# Patient Record
Sex: FEMALE | Employment: FULL TIME | URBAN - METROPOLITAN AREA
[De-identification: names, ages, dates, MRNs, and addresses within clinical notes are randomized per-mention and may not be internally consistent; named-entity substitution may affect disease eponyms.]

---

## 2017-02-20 RX ORDER — OLMESARTAN MEDOXOMIL-HYDROCHLOROTHIAZIDE 40; 25 MG/1; MG/1
TABLET, FILM COATED ORAL
Qty: 30 TAB | Refills: 11 | Status: SHIPPED | OUTPATIENT
Start: 2017-02-20 | End: 2018-01-24 | Stop reason: SDUPTHER

## 2017-03-07 RX ORDER — ESCITALOPRAM OXALATE 10 MG/1
TABLET ORAL
Qty: 30 TAB | Refills: 11 | Status: SHIPPED | OUTPATIENT
Start: 2017-03-07 | End: 2018-03-13 | Stop reason: SDUPTHER

## 2017-03-10 ENCOUNTER — TELEPHONE (OUTPATIENT)
Dept: FAMILY MEDICINE CLINIC | Age: 57
End: 2017-03-10

## 2017-03-10 NOTE — TELEPHONE ENCOUNTER
Spoke with pt and states she is taking Lexapro 10mg daily for hot flashes but wants to stop and try OTC medication for the hot flashes. Pt would like to know how to wean of medication. Informed pt would have to check with another MD since Dr. Hector Luna is out of the office today. Pt verbalized understanding. Called pt back and informed to take Lexapro 10mg- 1/2 tab daily x 1 week and then 1/2 tab every other day and then can stop per Dr. Lydia Thakkar. Pt verbalized understanding.

## 2017-03-10 NOTE — TELEPHONE ENCOUNTER
----- Message from Guy Rae sent at 3/10/2017  8:10 AM EST -----  Regarding: Dr. Celeste Valladares stated she would like a call from the nurse this morning regarding one of her medications. Best contact number [phone].

## 2017-07-19 ENCOUNTER — OFFICE VISIT (OUTPATIENT)
Dept: FAMILY MEDICINE CLINIC | Age: 57
End: 2017-07-19

## 2017-07-19 NOTE — PROGRESS NOTES
HISTORY OF PRESENT ILLNESS  Susana Aparicio is a 55 y.o. female. HPI   For follow up on BP. Doing well. Has no c/o noted today. HTN follow up:  Compliant w/ meds, low salt diet, and exercise. No home bp monitoring. No swelling, headache or dizziness. No chest pain, SOB, palpitations. Otherwise feeling well since the last visit. Glucose intolerance reveiw:  She has IGT. Last a1c 5.9% in April 2015. Diabetic ROS - further diabetic ROS: no polyuria or polydipsia, no chest pain, dyspnea or TIA's, no numbness, tingling or pain in extremities, no unusual visual symptoms. Lab review: orders written for new lab studies as appropriate; see orders  Depression Review:  Patient is seen for followup of depression. Treatment includes Lexapro. Ongoing symptoms include feeling great! Still has insomnia. She denies depressed mood and fatigue. She experiences the following side effects from the treatment: none. Patient Active Problem List   Diagnosis Code    Anemia NEC     Insomnia G47.00    Encounter for medication monitoring Z51.81    Environmental allergies Z91.09    IGT (impaired glucose tolerance) R73.02    Depression F32.9    Essential hypertension I10       Current Outpatient Prescriptions   Medication Sig Dispense Refill    escitalopram oxalate (LEXAPRO) 10 mg tablet take 1 tablet by mouth once daily 30 Tab 11    BENICAR HCT 40-25 mg per tablet take 1 tablet by mouth once daily 30 Tab 11    amLODIPine (NORVASC) 5 mg tablet take 1 by mouth every morning 30 Tab 11    cyclobenzaprine (FLEXERIL) 10 mg tablet Take 1 Tab by mouth three (3) times daily as needed for Muscle Spasm(s). 20 Tab 0    promethazine-dextromethorphan (PROMETHAZINE-DM) 6.25-15 mg/5 mL syrup Take 5 mL by mouth every six (6) hours as needed for Cough.  180 mL 1    ibuprofen (MOTRIN) 800 mg tablet take 1 tablet by mouth every 8 hours if needed for pain 60 Tab 1    ALPRAZolam (XANAX) 0.5 mg tablet Take 1 Tab by mouth nightly as needed for Sleep. Max Daily Amount: 0.5 mg. 30 Tab 3    loratadine (CLARITIN) 10 mg tablet Take 1 Tab by mouth daily as needed for Allergies. 30 Tab 0    omeprazole (PRILOSEC) 40 mg capsule Take 1 capsule by mouth daily. 30 capsule 6    fluticasone (FLONASE) 50 mcg/actuation nasal spray 2 Sprays by Both Nostrils route daily as needed for Rhinitis. 1 Bottle 6    aspirin 81 mg tablet Take 81 mg by mouth daily.          No Known Allergies      Past Medical History:   Diagnosis Date    Anemia NEC     Glucose intolerance     HTN (hypertension) 3/26/2010    Insomnia          Past Surgical History:   Procedure Laterality Date    ENDOSCOPY, COLON, DIAGNOSTIC      HX GYN  2006    fibroids removed from uterus    HX TUBAL LIGATION  1993         Family History   Problem Relation Age of Onset    Stroke Mother     Hypertension Sister     Stroke Brother     Hypertension Sister     No Known Problems Sister     No Known Problems Sister     No Known Problems Sister     Stroke Brother        Social History   Substance Use Topics    Smoking status: Never Smoker    Smokeless tobacco: Never Used    Alcohol use 1.5 oz/week     3 Glasses of wine per week        Lab Results   Component Value Date/Time    WBC 4.1 08/23/2016 04:24 PM    HGB 12.0 08/23/2016 04:24 PM    HCT 35.6 08/23/2016 04:24 PM    PLATELET 458 91/34/3540 04:24 PM    MCV 91 08/23/2016 04:24 PM       Lab Results   Component Value Date/Time    Cholesterol, total 231 08/23/2016 04:24 PM    HDL Cholesterol 72 08/23/2016 04:24 PM    LDL, calculated 135 08/23/2016 04:24 PM    Triglyceride 122 08/23/2016 04:24 PM    CHOL/HDL Ratio 2.6 05/04/2010 12:30 PM       Lab Results   Component Value Date/Time    Sodium 140 08/23/2016 04:24 PM    Potassium 3.8 08/23/2016 04:24 PM    Chloride 96 08/23/2016 04:24 PM    CO2 28 08/23/2016 04:24 PM    Anion gap 10 05/04/2010 12:30 PM    Glucose 98 08/23/2016 04:24 PM    BUN 11 08/23/2016 04:24 PM    Creatinine 0.86 08/23/2016 04:24 PM    BUN/Creatinine ratio 13 08/23/2016 04:24 PM    GFR est AA 88 08/23/2016 04:24 PM    GFR est non-AA 76 08/23/2016 04:24 PM    Calcium 10.1 08/23/2016 04:24 PM    Bilirubin, total <0.2 08/23/2016 04:24 PM    ALT (SGPT) 27 08/23/2016 04:24 PM    AST (SGOT) 22 08/23/2016 04:24 PM    Alk. phosphatase 80 08/23/2016 04:24 PM    Protein, total 8.0 08/23/2016 04:24 PM    Albumin 4.8 08/23/2016 04:24 PM    Globulin 4.0 01/04/2010 12:44 PM    A-G Ratio 1.5 08/23/2016 04:24 PM      Lab Results   Component Value Date/Time    Hemoglobin A1c 5.7 07/22/2013 02:43 PM    Hemoglobin A1c (POC) 5.9 08/23/2016 04:24 PM         Review of Systems   Constitutional: Negative for malaise/fatigue. HENT: Negative for congestion. Eyes: Negative for blurred vision. Respiratory: Negative for cough and shortness of breath. Cardiovascular: Negative for chest pain, palpitations and leg swelling. Gastrointestinal: Negative for abdominal pain, constipation and heartburn. Genitourinary: Negative for dysuria, frequency and urgency. Musculoskeletal: Negative for back pain and joint pain. Neurological: Negative for dizziness, tingling and headaches. Endo/Heme/Allergies: Negative for environmental allergies. Psychiatric/Behavioral: Negative for depression. The patient does not have insomnia. Physical Exam   Constitutional: She appears well-developed and well-nourished. /71 (BP 1 Location: Left arm, BP Patient Position: Sitting)  Pulse 66  Temp 98 °F (36.7 °C) (Oral)   Resp 16  Ht 5' 2\" (1.575 m)  Wt 152 lb (68.9 kg)  LMP 03/01/2013  SpO2 97%  BMI 27.8 kg/m2     HENT:   Right Ear: Tympanic membrane and ear canal normal.   Left Ear: Tympanic membrane and ear canal normal.   Nose: No mucosal edema or rhinorrhea. Mouth/Throat: Oropharynx is clear and moist and mucous membranes are normal.   Neck: Normal range of motion. Neck supple. No thyromegaly present.    Cardiovascular: Normal rate and regular rhythm. No murmur heard. Pulmonary/Chest: Effort normal and breath sounds normal.   Abdominal: Soft. Bowel sounds are normal. There is no tenderness. Musculoskeletal: Normal range of motion. She exhibits no edema. Lymphadenopathy:     She has no cervical adenopathy. Skin: Skin is warm and dry. Psychiatric: She has a normal mood and affect. Nursing note and vitals reviewed. ASSESSMENT and PLAN  David Smith was seen today for hypertension. Diagnoses and all orders for this visit:    Essential hypertension with goal blood pressure less than 140/90  -     LIPID PANEL    IGT (impaired glucose tolerance)  -     AMB POC HEMOGLOBIN A1C    Depression, unspecified depression type  Stable     Encounter for medication monitoring  -     METABOLIC PANEL, COMPREHENSIVE  -     CBC W/O DIFF      Follow-up Disposition: Not on File  reviewed diet, exercise and weight control  cardiovascular risk and specific lipid/LDL goals reviewed  reviewed medications and side effects in detail  specific diabetic recommendations: low cholesterol diet, weight control and daily exercise discussed and glycohemoglobin and other lab monitoring discussed     I have discussed diagnosis listed in this note with pt and/or family. I have discussed treatment plans and options and the risk/benefit analysis of those options, including safe use of medications and possible medication side effects. Through the use of shared decision making we have agreed to the above plan. The patient has received an after-visit summary and questions were answered concerning future plans and follow up. Advise pt of any urgent changes then to proceed to the ER.

## 2017-07-19 NOTE — MR AVS SNAPSHOT
Visit Information Date & Time Provider Department Dept. Phone Encounter #  
 7/19/2017  3:15 PM Janna Calixto MD [facility] [phone] [id number] Upcoming Health Maintenance Date Due Hepatitis C Screening 1960 PAP AKA CERVICAL CYTOLOGY 10/14/2017 INFLUENZA AGE 9 TO ADULT 8/1/2017 BREAST CANCER SCRN MAMMOGRAM 7/26/2018 COLONOSCOPY 6/19/2023 DTaP/Tdap/Td series (2 - Td) 8/23/2026 Allergies as of 7/19/2017  Review Complete On: 7/19/2017 By: Janna Calixto MD  
 No Known Allergies Current Immunizations  Reviewed on 8/23/2016 Name Date Influenza Vaccine 9/15/2014 Influenza Vaccine (Quad) PF 8/23/2016, 11/20/2015 Influenza Vaccine Split 10/22/2012, 11/22/2011, 11/5/2010 Tdap 8/23/2016 Not reviewed this visit Vitals LMP OB Status Smoking Status 03/01/2013 Postmenopausal Never Smoker Preferred Pharmacy Pharmacy Name Phone [phone] [phone] Your Updated Medication List  
  
   
This list is accurate as of: 7/19/17  4:25 PM.  Always use your most recent med list.  
  
  
  
  
 ALPRAZolam 0.5 mg tablet Commonly known as:  Tang Fabio Take 1 Tab by mouth nightly as needed for Sleep. Max Daily Amount: 0.5 mg.  
  
 amLODIPine 5 mg tablet Commonly known as:  NORVASC  
take 1 by mouth every morning  
  
 aspirin 81 mg tablet Take 81 mg by mouth daily. BENICAR HCT 40-25 mg per tablet Generic drug:  olmesartan-hydroCHLOROthiazide  
take 1 tablet by mouth once daily  
  
 cyclobenzaprine 10 mg tablet Commonly known as:  FLEXERIL Take 1 Tab by mouth three (3) times daily as needed for Muscle Spasm(s). escitalopram oxalate 10 mg tablet Commonly known as:  LEXAPRO  
take 1 tablet by mouth once daily  
  
 fluticasone 50 mcg/actuation nasal spray Commonly known as:  Liliana Sterling 2 Sprays by Both Nostrils route daily as needed for Rhinitis. ibuprofen 800 mg tablet Commonly known as:  MOTRIN  
take 1 tablet by mouth every 8 hours if needed for pain  
  
 loratadine 10 mg tablet Commonly known as:  Aster Case Take 1 Tab by mouth daily as needed for Allergies. omeprazole 40 mg capsule Commonly known as:  PriLOSEC Take 1 capsule by mouth daily. promethazine-dextromethorphan 6.25-15 mg/5 mL syrup Commonly known as:  PROMETHAZINE-DM Take 5 mL by mouth every six (6) hours as needed for Cough. Introducing [facility]! [facility] introduces [facility]Arc patient portal. Now you can access parts of your medical record, email your doctor's office, and request medication refills online. 1. In your internet browser, go to [email or web address] 2. Click on the First Time User? Click Here link in the Sign In box. You will see the New Member Sign Up page. 3. Enter your BlueArc Access Code exactly as it appears below. You will not need to use this code after youve completed the sign-up process. If you do not sign up before the expiration date, you must request a new code. · BlueArc Access Code: [email or web address] Expires: 10/17/2017  4:25 PM 
 
4. Enter the last four digits of your Social Security Number (xxxx) and Date of Birth (mm/dd/yyyy) as indicated and click Submit. You will be taken to the next sign-up page. 5. Create a BlueArc ID. This will be your BlueArc login ID and cannot be changed, so think of one that is secure and easy to remember. 6. Create a BlueArc password. You can change your password at any time. 7. Enter your Password Reset Question and Answer. This can be used at a later time if you forget your password. 8. Enter your e-mail address. You will receive e-mail notification when new information is available in [address] 9. Click Sign Up. You can now view and download portions of your medical record. 10. Click the Download Summary menu link to download a portable copy of your medical information. If you have questions, please visit the Frequently Asked Questions section of the [employer] website. Remember, [employer] is NOT to be used for urgent needs. For medical emergencies, dial 911. Now available from your iPhone and Android! Please provide this summary of care documentation to your next provider. Your primary care clinician is listed as Josafat Shukla. If you have any questions after today's visit, please call [phone].

## 2017-08-30 ENCOUNTER — OFFICE VISIT (OUTPATIENT)
Dept: FAMILY MEDICINE CLINIC | Age: 57
End: 2017-08-30

## 2017-08-30 VITALS
TEMPERATURE: 98.1 F | HEART RATE: 99 BPM | RESPIRATION RATE: 20 BRPM | DIASTOLIC BLOOD PRESSURE: 79 MMHG | BODY MASS INDEX: 28.52 KG/M2 | OXYGEN SATURATION: 95 % | SYSTOLIC BLOOD PRESSURE: 137 MMHG | HEIGHT: 62 IN | WEIGHT: 155 LBS

## 2017-08-30 DIAGNOSIS — Z11.59 NEED FOR HEPATITIS C SCREENING TEST: ICD-10-CM

## 2017-08-30 DIAGNOSIS — R73.02 IGT (IMPAIRED GLUCOSE TOLERANCE): ICD-10-CM

## 2017-08-30 DIAGNOSIS — F32.A DEPRESSION, UNSPECIFIED DEPRESSION TYPE: ICD-10-CM

## 2017-08-30 DIAGNOSIS — Z51.81 ENCOUNTER FOR MEDICATION MONITORING: ICD-10-CM

## 2017-08-30 DIAGNOSIS — Z12.31 ENCOUNTER FOR SCREENING MAMMOGRAM FOR BREAST CANCER: ICD-10-CM

## 2017-08-30 DIAGNOSIS — I10 ESSENTIAL HYPERTENSION: Primary | ICD-10-CM

## 2017-08-30 DIAGNOSIS — Z91.09 ENVIRONMENTAL ALLERGIES: ICD-10-CM

## 2017-08-30 DIAGNOSIS — Z23 ENCOUNTER FOR IMMUNIZATION: ICD-10-CM

## 2017-08-30 LAB
BILIRUB UR QL STRIP: NEGATIVE
GLUCOSE UR-MCNC: NEGATIVE MG/DL
HBA1C MFR BLD HPLC: 5.9 %
KETONES P FAST UR STRIP-MCNC: NEGATIVE MG/DL
PH UR STRIP: 7 [PH] (ref 4.6–8)
PROT UR QL STRIP: NEGATIVE MG/DL
SP GR UR STRIP: 1.01 (ref 1–1.03)
UA UROBILINOGEN AMB POC: NORMAL (ref 0.2–1)
URINALYSIS CLARITY POC: CLEAR
URINALYSIS COLOR POC: YELLOW
URINE BLOOD POC: NORMAL
URINE LEUKOCYTES POC: NEGATIVE
URINE NITRITES POC: NEGATIVE

## 2017-08-30 NOTE — PATIENT INSTRUCTIONS
Vaccine Information Statement    Influenza (Flu) Vaccine (Inactivated or Recombinant): What you need to know    Many Vaccine Information Statements are available in Turkish and other languages. See www.immunize.org/vis  Hojas de Información Sobre Vacunas están disponibles en Español y en muchos otros idiomas. Visite www.immunize.org/vis    1. Why get vaccinated? Influenza (flu) is a contagious disease that spreads around the [place] every year, usually between October and May. Flu is caused by influenza viruses, and is spread mainly by coughing, sneezing, and close contact. Anyone can get flu. Flu strikes suddenly and can last several days. Symptoms vary by age, but can include:   fever/chills   sore throat   muscle aches   fatigue   cough   headache    runny or stuffy nose    Flu can also lead to pneumonia and blood infections, and cause diarrhea and seizures in children. If you have a medical condition, such as heart or lung disease, flu can make it worse. Flu is more dangerous for some people. Infants and young children, people 72years of age and older, pregnant women, and people with certain health conditions or a weakened immune system are at greatest risk. Each year thousands of people in the [facility] die from flu, and many more are hospitalized. Flu vaccine can:   keep you from getting flu,   make flu less severe if you do get it, and   keep you from spreading flu to your family and other people. 2. Inactivated and recombinant flu vaccines    A dose of flu vaccine is recommended every flu season. Children 6 months through 6years of age may need two doses during the same flu season. Everyone else needs only one dose each flu season.        Some inactivated flu vaccines contain a very small amount of a mercury-based preservative called thimerosal. Studies have not shown thimerosal in vaccines to be harmful, but flu vaccines that do not contain thimerosal are available. There is no live flu virus in flu shots. They cannot cause the flu. There are many flu viruses, and they are always changing. Each year a new flu vaccine is made to protect against three or four viruses that are likely to cause disease in the upcoming flu season. But even when the vaccine doesnt exactly match these viruses, it may still provide some protection    Flu vaccine cannot prevent:   flu that is caused by a virus not covered by the vaccine, or   illnesses that look like flu but are not. It takes about 2 weeks for protection to develop after vaccination, and protection lasts through the flu season. 3. Some people should not get this vaccine    Tell the person who is giving you the vaccine:     If you have any severe, life-threatening allergies. If you ever had a life-threatening allergic reaction after a dose of flu vaccine, or have a severe allergy to any part of this vaccine, you may be advised not to get vaccinated. Most, but not all, types of flu vaccine contain a small amount of egg protein.  If you ever had Guillain-Barré Syndrome (also called GBS). Some people with a history of GBS should not get this vaccine. This should be discussed with your doctor.  If you are not feeling well. It is usually okay to get flu vaccine when you have a mild illness, but you might be asked to come back when you feel better. 4. Risks of a vaccine reaction    With any medicine, including vaccines, there is a chance of reactions. These are usually mild and go away on their own, but serious reactions are also possible. Most people who get a flu shot do not have any problems with it.      Minor problems following a flu shot include:    soreness, redness, or swelling where the shot was given     hoarseness   sore, red or itchy eyes   cough   fever   aches   headache   itching   fatigue  If these problems occur, they usually begin soon after the shot and last 1 or 2 days. More serious problems following a flu shot can include the following:     There may be a small increased risk of Guillain-Barré Syndrome (GBS) after inactivated flu vaccine. This risk has been estimated at 1 or 2 additional cases per million people vaccinated. This is much lower than the risk of severe complications from flu, which can be prevented by flu vaccine.  Young children who get the flu shot along with pneumococcal vaccine (PCV13) and/or DTaP vaccine at the same time might be slightly more likely to have a seizure caused by fever. Ask your doctor for more information. Tell your doctor if a child who is getting flu vaccine has ever had a seizure. Problems that could happen after any injected vaccine:      People sometimes faint after a medical procedure, including vaccination. Sitting or lying down for about 15 minutes can help prevent fainting, and injuries caused by a fall. Tell your doctor if you feel dizzy, or have vision changes or ringing in the ears.  Some people get severe pain in the shoulder and have difficulty moving the arm where a shot was given. This happens very rarely.  Any medication can cause a severe allergic reaction. Such reactions from a vaccine are very rare, estimated at about 1 in a million doses, and would happen within a few minutes to a few hours after the vaccination. As with any medicine, there is a very remote chance of a vaccine causing a serious injury or death. The safety of vaccines is always being monitored. For more information, visit: www.cdc.gov/vaccinesafety/    5. What if there is a serious reaction? What should I look for?  Look for anything that concerns you, such as signs of a severe allergic reaction, very high fever, or unusual behavior.     Signs of a severe allergic reaction can include hives, swelling of the face and throat, difficulty breathing, a fast heartbeat, dizziness, and weakness  usually within a few minutes to a few hours after the vaccination. What should I do?  If you think it is a severe allergic reaction or other emergency that cant wait, call 9-1-1 and get the person to the nearest hospital. Otherwise, call your doctor.  Reactions should be reported to the Vaccine Adverse Event Reporting System (VAERS). Your doctor should file this report, or you can do it yourself through  the VAERS web site at www.vaers. [facility].gov, or by calling 0-532.290.6732. VAERS does not give medical advice. 6. The National Vaccine Injury Compensation Program    The [facility] Vaccine Injury Compensation Program (VICP) is a federal program that was created to compensate people who may have been injured by certain vaccines. Persons who believe they may have been injured by a vaccine can learn about the program and about filing a claim by calling 7-655.637.4531 or visiting the [employer] website at www.[facility].gov/vaccinecompensation. There is a time limit to file a claim for compensation. 7. How can I learn more?  Ask your healthcare provider. He or she can give you the vaccine package insert or suggest other sources of information.  Call your local or state health department.  Contact the Centers for Disease Control and Prevention (CDC):  - Call 2-197.313.6245 (1-800-CDC-INFO) or  - Visit CDCs website at www.cdc.gov/flu    Vaccine Information Statement   Inactivated Influenza Vaccine   8/7/2015  42 MINA Moore [id number]-[id number]    Department of Health and Human Services  Centers for Disease Control and Prevention    Office Use Only

## 2017-08-30 NOTE — MR AVS SNAPSHOT
Visit Information Date & Time Provider Department Dept. Phone Encounter #  
 8/30/2017  9:45 AM Pallavi Becker MD [facility] [phone] [id number] Follow-up Instructions Return in about 6 months (around 2/28/2018). Upcoming Health Maintenance Date Due Hepatitis C Screening 1960 PAP AKA CERVICAL CYTOLOGY 10/14/2017 BREAST CANCER SCRN MAMMOGRAM 7/26/2018 COLONOSCOPY 6/19/2023 DTaP/Tdap/Td series (2 - Td) 8/23/2026 Allergies as of 8/30/2017  Review Complete On: 8/30/2017 By: Pallavi Becker MD  
 No Known Allergies Current Immunizations  Reviewed on 8/30/2017 Name Date Influenza Vaccine 9/15/2014 Influenza Vaccine (Quad) PF 8/30/2017 10:29 AM, 8/23/2016, 11/20/2015 Influenza Vaccine Split 10/22/2012, 11/22/2011, 11/5/2010 Tdap 8/23/2016 Reviewed by Pallavi Becker MD on 8/30/2017 at 10:44 AM  
You Were Diagnosed With   
  
 Codes Comments Essential hypertension    -  Primary ICD-10-CM: I10 
ICD-9-CM: 401.9 Encounter for medication monitoring     ICD-10-CM: Z51.81 
ICD-9-CM: V58.83 Environmental allergies     ICD-10-CM: Z91.09 
ICD-9-CM: V15.09   
 IGT (impaired glucose tolerance)     ICD-10-CM: R73.02 
ICD-9-CM: 790.22 Encounter for immunization     ICD-10-CM: H79 ICD-9-CM: V03.89 Vitals BP Pulse Temp Resp Height(growth percentile) Weight(growth percentile)  
 137/79 99 98.1 °F (36.7 °C) (Oral) 20 5' 2\" (1.575 m) 155 lb (70.3 kg) LMP SpO2 BMI OB Status Smoking Status 03/01/2013 95% 28.35 kg/m2 Postmenopausal Never Smoker Vitals History BMI and BSA Data Body Mass Index Body Surface Area  
 28.35 kg/m 2 1.75 m 2 Preferred Pharmacy Pharmacy Name Phone [facility] [id number] [phone] Your Updated Medication List  
  
   
 This list is accurate as of: 8/30/17 10:47 AM.  Always use your most recent med list.  
  
  
  
  
 ALPRAZolam 0.5 mg tablet Commonly known as:  Nany  Take 1 Tab by mouth nightly as needed for Sleep. Max Daily Amount: 0.5 mg.  
  
 amLODIPine 5 mg tablet Commonly known as:  NORVASC  
take 1 by mouth every morning  
  
 aspirin 81 mg tablet Take 81 mg by mouth daily. BENICAR HCT 40-25 mg per tablet Generic drug:  olmesartan-hydroCHLOROthiazide  
take 1 tablet by mouth once daily  
  
 escitalopram oxalate 10 mg tablet Commonly known as:  LEXAPRO  
take 1 tablet by mouth once daily  
  
 ibuprofen 800 mg tablet Commonly known as:  MOTRIN  
take 1 tablet by mouth every 8 hours if needed for pain  
  
 omeprazole 40 mg capsule Commonly known as:  PriLOSEC Take 1 capsule by mouth daily. We Performed the Following AMB POC HEMOGLOBIN A1C [02409 CPT(R)] AMB POC URINALYSIS DIP STICK AUTO W/ MICRO [46696 CPT(R)] CBC W/O DIFF [58270 CPT(R)] INFLUENZA VIRUS VAC QUAD,SPLIT,PRESV FREE SYRINGE 3/> YRS IM [id number] CPT(R)] LIPID PANEL [03733 CPT(R)] METABOLIC PANEL, COMPREHENSIVE [41504 CPT(R)] Follow-up Instructions Return in about 6 months (around 2/28/2018). Patient Instructions Vaccine Information Statement Influenza (Flu) Vaccine (Inactivated or Recombinant): What you need to know Many Vaccine Information Statements are available in Maltese and other languages. See www.immunize.org/vis Hojas de Información Sobre Vacunas están disponibles en Español y en muchos otros idiomas. Visite www.immunize.org/vis 1. Why get vaccinated? Influenza (flu) is a contagious disease that spreads around the [place] every year, usually between October and May. Flu is caused by influenza viruses, and is spread mainly by coughing, sneezing, and close contact. Anyone can get flu. Flu strikes suddenly and can last several days. Symptoms vary by age, but can include: 
 fever/chills  sore throat  muscle aches  fatigue  cough  headache  runny or stuffy nose Flu can also lead to pneumonia and blood infections, and cause diarrhea and seizures in children. If you have a medical condition, such as heart or lung disease, flu can make it worse. Flu is more dangerous for some people. Infants and young children, people 72years of age and older, pregnant women, and people with certain health conditions or a weakened immune system are at greatest risk. Each year thousands of people in the [facility] die from flu, and many more are hospitalized. Flu vaccine can: 
 keep you from getting flu, 
 make flu less severe if you do get it, and 
 keep you from spreading flu to your family and other people. 2. Inactivated and recombinant flu vaccines A dose of flu vaccine is recommended every flu season. Children 6 months through 6years of age may need two doses during the same flu season. Everyone else needs only one dose each flu season. Some inactivated flu vaccines contain a very small amount of a mercury-based preservative called thimerosal. Studies have not shown thimerosal in vaccines to be harmful, but flu vaccines that do not contain thimerosal are available. There is no live flu virus in flu shots. They cannot cause the flu. There are many flu viruses, and they are always changing. Each year a new flu vaccine is made to protect against three or four viruses that are likely to cause disease in the upcoming flu season. But even when the vaccine doesnt exactly match these viruses, it may still provide some protection Flu vaccine cannot prevent: 
 flu that is caused by a virus not covered by the vaccine, or 
 illnesses that look like flu but are not. It takes about 2 weeks for protection to develop after vaccination, and protection lasts through the flu season. 3. Some people should not get this vaccine Tell the person who is giving you the vaccine:  If you have any severe, life-threatening allergies. If you ever had a life-threatening allergic reaction after a dose of flu vaccine, or have a severe allergy to any part of this vaccine, you may be advised not to get vaccinated. Most, but not all, types of flu vaccine contain a small amount of egg protein.  If you ever had Guillain-Barré Syndrome (also called GBS). Some people with a history of GBS should not get this vaccine. This should be discussed with your doctor.  If you are not feeling well. It is usually okay to get flu vaccine when you have a mild illness, but you might be asked to come back when you feel better. 4. Risks of a vaccine reaction With any medicine, including vaccines, there is a chance of reactions. These are usually mild and go away on their own, but serious reactions are also possible. Most people who get a flu shot do not have any problems with it. Minor problems following a flu shot include:  
 soreness, redness, or swelling where the shot was given  hoarseness  sore, red or itchy eyes  cough  fever  aches  headache  itching  fatigue If these problems occur, they usually begin soon after the shot and last 1 or 2 days. More serious problems following a flu shot can include the following:  There may be a small increased risk of Guillain-Barré Syndrome (GBS) after inactivated flu vaccine. This risk has been estimated at 1 or 2 additional cases per million people vaccinated. This is much lower than the risk of severe complications from flu, which can be prevented by flu vaccine.  Young children who get the flu shot along with pneumococcal vaccine (PCV13) and/or DTaP vaccine at the same time might be slightly more likely to have a seizure caused by fever. Ask your doctor for more information. Tell your doctor if a child who is getting flu vaccine has ever had a seizure. Problems that could happen after any injected vaccine:  People sometimes faint after a medical procedure, including vaccination. Sitting or lying down for about 15 minutes can help prevent fainting, and injuries caused by a fall. Tell your doctor if you feel dizzy, or have vision changes or ringing in the ears.  Some people get severe pain in the shoulder and have difficulty moving the arm where a shot was given. This happens very rarely.  Any medication can cause a severe allergic reaction. Such reactions from a vaccine are very rare, estimated at about 1 in a million doses, and would happen within a few minutes to a few hours after the vaccination. As with any medicine, there is a very remote chance of a vaccine causing a serious injury or death. The safety of vaccines is always being monitored. For more information, visit: www.cdc.gov/vaccinesafety/ 
 
 
6. The National Vaccine Injury [employer] 
 
 The "[employer]" Injury Compensation Program (VICP) is a federal program that was created to compensate people who may have been injured by certain vaccines. Persons who believe they may have been injured by a vaccine can learn about the program and about filing a claim by calling 7-278.502.2714 or visiting the [employer] website at www.[facility].gov/vaccinecompensation. There is a time limit to file a claim for compensation. 7. How can I learn more?  Ask your healthcare provider. He or she can give you the vaccine package insert or suggest other sources of information.  Call your local or state health department.  Contact the Centers for Disease Control and Prevention (CDC): 
- Call 1-331.235.4073 (9-252-UBX-INFO) or 
- Visit CDCs website at www.cdc.gov/flu Vaccine Information Statement Inactivated Influenza Vaccine 8/7/2015 
42 MIN[address] [id number] Department [facility] [employer] Centers for Disease Control and Prevention Office Use Only Introducing [facility]! Daniel Ziegler introduces [employer] patient portal. Now you can access parts of your medical record, email your doctor's office, and request medication refills online. 1. In your internet browser, go to [email or web address] 2. Click on the First Time User? Click Here link in the Sign In box. You will see the New Member Sign Up page. 3. Enter your [employer] Access Code exactly as it appears below. You will not need to use this code after youve completed the sign-up process. If you do not sign up before the expiration date, you must request a new code. · [employer] Access Code: [email or web address] Expires: 10/17/2017  4:25 PM 
 
4. Enter the last four digits of your Social Security Number (xxxx) and Date of Birth (mm/dd/yyyy) as indicated and click Submit. You will be taken to the next sign-up page. 5. Create a [employer] ID.  This will be your [employer] login ID and cannot be changed, so think of one that is secure and easy to remember. 6. Create a [employer] password. You can change your password at any time. 7. Enter your Password Reset Question and Answer. This can be used at a later time if you forget your password. 8. Enter your e-mail address. You will receive e-mail notification when new information is available in [address] 9. Click Sign Up. You can now view and download portions of your medical record. 10. Click the Download Summary menu link to download a portable copy of your medical information. If you have questions, please visit the Frequently Asked Questions section of the [employer] website. Remember, [employer] is NOT to be used for urgent needs. For medical emergencies, dial 911. Now available from your iPhone and Android! Please provide this summary of care documentation to your next provider. Your primary care clinician is listed as Vikash Hendrix. If you have any questions after today's visit, please call [phone].

## 2017-08-30 NOTE — PROGRESS NOTES
HISTORY OF PRESENT ILLNESS  Precious Sal is a 65 y.o. female. HPI   For follow up on BP. Doing well. Has no c/o noted today. HTN follow up:  Compliant w/ meds, low salt diet, and exercise. No home bp monitoring. No swelling, headache or dizziness. No chest pain, SOB, palpitations. Otherwise feeling well since the last visit. Glucose intolerance reveiw:  She has IGT. Last a1c 5.9% in April 2015 and August 2016. Diabetic ROS - further diabetic ROS: no polyuria or polydipsia, no chest pain, dyspnea or TIA's, no numbness, tingling or pain in extremities, no unusual visual symptoms. Lab review: orders written for new lab studies as appropriate; see orders  Depression Review:  Patient is seen for followup of depression. Treatment includes Lexapro. Ongoing symptoms include feeling great! Still has insomnia. She denies depressed mood and fatigue. She experiences the following side effects from the treatment: none. Patient Active Problem List   Diagnosis Code    Anemia NEC     Insomnia G47.00    Encounter for medication monitoring Z51.81    Environmental allergies Z91.09    IGT (impaired glucose tolerance) R73.02    Depression F32.9    Essential hypertension I10       Current Outpatient Prescriptions   Medication Sig Dispense Refill    escitalopram oxalate (LEXAPRO) 10 mg tablet take 1 tablet by mouth once daily 30 Tab 11    BENICAR HCT 40-25 mg per tablet take 1 tablet by mouth once daily 30 Tab 11    amLODIPine (NORVASC) 5 mg tablet take 1 by mouth every morning 30 Tab 11    cyclobenzaprine (FLEXERIL) 10 mg tablet Take 1 Tab by mouth three (3) times daily as needed for Muscle Spasm(s). 20 Tab 0    promethazine-dextromethorphan (PROMETHAZINE-DM) 6.25-15 mg/5 mL syrup Take 5 mL by mouth every six (6) hours as needed for Cough.  180 mL 1    ibuprofen (MOTRIN) 800 mg tablet take 1 tablet by mouth every 8 hours if needed for pain 60 Tab 1    ALPRAZolam (XANAX) 0.5 mg tablet Take 1 Tab by mouth nightly as needed for Sleep. Max Daily Amount: 0.5 mg. 30 Tab 3    loratadine (CLARITIN) 10 mg tablet Take 1 Tab by mouth daily as needed for Allergies. 30 Tab 0    omeprazole (PRILOSEC) 40 mg capsule Take 1 capsule by mouth daily. 30 capsule 6    fluticasone (FLONASE) 50 mcg/actuation nasal spray 2 Sprays by Both Nostrils route daily as needed for Rhinitis. 1 Bottle 6    aspirin 81 mg tablet Take 81 mg by mouth daily.          No Known Allergies      Past Medical History:   Diagnosis Date    Anemia NEC     Glucose intolerance     HTN (hypertension) 3/26/2010    Insomnia          Past Surgical History:   Procedure Laterality Date    ENDOSCOPY, COLON, DIAGNOSTIC      HX GYN  2006    fibroids removed from uterus    HX TUBAL LIGATION  1993         Family History   Problem Relation Age of Onset    Stroke Mother     Hypertension Sister     Stroke Brother     Hypertension Sister     No Known Problems Sister     No Known Problems Sister     No Known Problems Sister     Stroke Brother        Social History   Substance Use Topics    Smoking status: Never Smoker    Smokeless tobacco: Never Used    Alcohol use 1.5 oz/week     3 Glasses of wine per week        Lab Results   Component Value Date/Time    WBC 4.1 08/23/2016 04:24 PM    HGB 12.0 08/23/2016 04:24 PM    HCT 35.6 08/23/2016 04:24 PM    PLATELET 860 06/45/0253 04:24 PM    MCV 91 08/23/2016 04:24 PM       Lab Results   Component Value Date/Time    Cholesterol, total 231 08/23/2016 04:24 PM    HDL Cholesterol 72 08/23/2016 04:24 PM    LDL, calculated 135 08/23/2016 04:24 PM    Triglyceride 122 08/23/2016 04:24 PM    CHOL/HDL Ratio 2.6 05/04/2010 12:30 PM       Lab Results   Component Value Date/Time    Sodium 140 08/23/2016 04:24 PM    Potassium 3.8 08/23/2016 04:24 PM    Chloride 96 08/23/2016 04:24 PM    CO2 28 08/23/2016 04:24 PM    Anion gap 10 05/04/2010 12:30 PM    Glucose 98 08/23/2016 04:24 PM    BUN 11 08/23/2016 04:24 PM Creatinine 0.86 08/23/2016 04:24 PM    BUN/Creatinine ratio 13 08/23/2016 04:24 PM    GFR est AA 88 08/23/2016 04:24 PM    GFR est non-AA 76 08/23/2016 04:24 PM    Calcium 10.1 08/23/2016 04:24 PM    Bilirubin, total <0.2 08/23/2016 04:24 PM    ALT (SGPT) 27 08/23/2016 04:24 PM    AST (SGOT) 22 08/23/2016 04:24 PM    Alk. phosphatase 80 08/23/2016 04:24 PM    Protein, total 8.0 08/23/2016 04:24 PM    Albumin 4.8 08/23/2016 04:24 PM    Globulin 4.0 01/04/2010 12:44 PM    A-G Ratio 1.5 08/23/2016 04:24 PM      Lab Results   Component Value Date/Time    Hemoglobin A1c 5.7 07/22/2013 02:43 PM    Hemoglobin A1c (POC) 5.9 08/23/2016 04:24 PM         Review of Systems   Constitutional: Negative for malaise/fatigue. HENT: Negative for congestion. Eyes: Negative for blurred vision. Respiratory: Negative for cough and shortness of breath. Cardiovascular: Negative for chest pain, palpitations and leg swelling. Gastrointestinal: Negative for abdominal pain, constipation and heartburn. Genitourinary: Negative for dysuria, frequency and urgency. Musculoskeletal: Negative for back pain and joint pain. Neurological: Negative for dizziness, tingling and headaches. Endo/Heme/Allergies: Negative for environmental allergies. Psychiatric/Behavioral: Negative for depression. The patient does not have insomnia. Physical Exam   Constitutional: She appears well-developed and well-nourished. /79  Pulse 99  Temp 98.1 °F (36.7 °C) (Oral)   Resp 20  Ht 5' 2\" (1.575 m)  Wt 155 lb (70.3 kg)  LMP 03/01/2013  SpO2 95%  BMI 28.35 kg/m2     HENT:   Right Ear: Tympanic membrane and ear canal normal.   Left Ear: Tympanic membrane and ear canal normal.   Nose: No mucosal edema or rhinorrhea. Mouth/Throat: Oropharynx is clear and moist and mucous membranes are normal.   Neck: Normal range of motion. Neck supple. No thyromegaly present. Cardiovascular: Normal rate and regular rhythm.     No murmur heard.  Pulmonary/Chest: Effort normal and breath sounds normal.   Abdominal: Soft. Bowel sounds are normal. There is no tenderness. Musculoskeletal: Normal range of motion. She exhibits no edema. Lymphadenopathy:     She has no cervical adenopathy. Skin: Skin is warm and dry. Psychiatric: She has a normal mood and affect. Nursing note and vitals reviewed. ASSESSMENT and PLAN  Diagnoses and all orders for this visit:    1. Essential hypertension  -     LIPID PANEL  -     AMB POC URINALYSIS DIP STICK AUTO W/ MICRO    2. Depression, unspecified depression type  Stable     3. IGT (impaired glucose tolerance)  -     AMB POC HEMOGLOBIN A1C    4. Environmental allergies  Stable     5. Encounter for medication monitoring  -     METABOLIC PANEL, COMPREHENSIVE  -     CBC W/O DIFF    6. Encounter for immunization  -     Influenza virus vaccine (QUADRIVALENT PRES FREE SYRINGE) IM (12678)    7. Encounter for screening mammogram for breast cancer  -     [facility] MAMMO BI SCREENING INCL CAD; Future      Follow-up Disposition:  Return in about 6 months (around 2/28/2018). reviewed diet, exercise and weight control  cardiovascular risk and specific lipid/LDL goals reviewed  reviewed medications and side effects in detail    I have discussed diagnosis listed in this note with pt and/or family. I have discussed treatment plans and options and the risk/benefit analysis of those options, including safe use of medications and possible medication side effects. Through the use of shared decision making we have agreed to the above plan. The patient has received an after-visit summary and questions were answered concerning future plans and follow up. Advise pt of any urgent changes then to proceed to the ER.

## 2017-09-01 LAB
ALBUMIN SERPL-MCNC: 4.9 G/DL (ref 3.5–5.5)
ALBUMIN/GLOB SERPL: 1.5 {RATIO} (ref 1.2–2.2)
ALP SERPL-CCNC: 78 IU/L (ref 39–117)
ALT SERPL-CCNC: 20 IU/L (ref 0–32)
AST SERPL-CCNC: 19 IU/L (ref 0–40)
BILIRUB SERPL-MCNC: 0.2 MG/DL (ref 0–1.2)
BUN SERPL-MCNC: 10 MG/DL (ref 6–24)
BUN/CREAT SERPL: 12 (ref 9–23)
CALCIUM SERPL-MCNC: 10 MG/DL (ref 8.7–10.2)
CHLORIDE SERPL-SCNC: 95 MMOL/L (ref 96–106)
CHOLEST SERPL-MCNC: 227 MG/DL (ref 100–199)
CO2 SERPL-SCNC: 28 MMOL/L (ref 18–29)
CREAT SERPL-MCNC: 0.84 MG/DL (ref 0.57–1)
ERYTHROCYTE [DISTWIDTH] IN BLOOD BY AUTOMATED COUNT: 14.5 % (ref 12.3–15.4)
GLOBULIN SER CALC-MCNC: 3.2 G/DL (ref 1.5–4.5)
GLUCOSE SERPL-MCNC: 127 MG/DL (ref 65–99)
HCT VFR BLD AUTO: 37.3 % (ref 34–46.6)
HCV AB S/CO SERPL IA: <0.1 S/CO RATIO (ref 0–0.9)
HDLC SERPL-MCNC: 69 MG/DL
HGB BLD-MCNC: 12.6 G/DL (ref 11.1–15.9)
INTERPRETATION, 910389: NORMAL
LDLC SERPL CALC-MCNC: 134 MG/DL (ref 0–99)
MCH RBC QN AUTO: 31 PG (ref 26.6–33)
MCHC RBC AUTO-ENTMCNC: 33.8 G/DL (ref 31.5–35.7)
MCV RBC AUTO: 92 FL (ref 79–97)
PLATELET # BLD AUTO: 287 X10E3/UL (ref 150–379)
POTASSIUM SERPL-SCNC: 3.8 MMOL/L (ref 3.5–5.2)
PROT SERPL-MCNC: 8.1 G/DL (ref 6–8.5)
RBC # BLD AUTO: 4.07 X10E6/UL (ref 3.77–5.28)
SODIUM SERPL-SCNC: 139 MMOL/L (ref 134–144)
TRIGL SERPL-MCNC: 120 MG/DL (ref 0–149)
VLDLC SERPL CALC-MCNC: 24 MG/DL (ref 5–40)
WBC # BLD AUTO: 3.4 X10E3/UL (ref 3.4–10.8)

## 2017-09-15 RX ORDER — IBUPROFEN 800 MG/1
TABLET ORAL
Qty: 60 TAB | Refills: 1 | Status: SHIPPED | OUTPATIENT
Start: 2017-09-15 | End: 2018-07-16 | Stop reason: SDUPTHER

## 2017-10-04 DIAGNOSIS — Z12.31 ENCOUNTER FOR SCREENING MAMMOGRAM FOR BREAST CANCER: ICD-10-CM

## 2017-11-27 RX ORDER — AZITHROMYCIN 250 MG/1
TABLET, FILM COATED ORAL
Qty: 6 TAB | Refills: 0 | Status: SHIPPED | OUTPATIENT
Start: 2017-11-27 | End: 2017-12-02

## 2017-11-27 NOTE — TELEPHONE ENCOUNTER
----- Message from Khang Mayer sent at 11/27/2017  9:34 AM EST -----  Regarding: Dr. Jennifer Rubio  Pt would like medication for her head cold  sent to [facility] on [address]. The best contact number is [phone]. Pt stated she's had for 3 days and currently taking \"Coricidin\".

## 2017-11-27 NOTE — TELEPHONE ENCOUNTER
Pt would like antibiotic called into pharm. Pt c/o sinus congestion and sinus drainage. Will check with Dr. Niyah Barroso and call back. Called back and informed that Dr. Niyah Barroso will be sending an antibiotic to pharm. Pt verbalized understanding.

## 2017-12-16 DIAGNOSIS — I10 ESSENTIAL HYPERTENSION: ICD-10-CM

## 2017-12-18 RX ORDER — AMLODIPINE BESYLATE 5 MG/1
TABLET ORAL
Qty: 30 TAB | Refills: 11 | Status: SHIPPED | OUTPATIENT
Start: 2017-12-18 | End: 2018-01-23 | Stop reason: SDUPTHER

## 2017-12-28 NOTE — TELEPHONE ENCOUNTER
Pt.states that she was taking flonase,mussinex and she is feeling a little better she is still coughing and wants a RX for cough medication  she can be reached @ [phone]

## 2017-12-29 RX ORDER — PROMETHAZINE HYDROCHLORIDE AND DEXTROMETHORPHAN HYDROBROMIDE 6.25; 15 MG/5ML; MG/5ML
5 SYRUP ORAL
Qty: 240 ML | Refills: 1 | Status: SHIPPED | OUTPATIENT
Start: 2017-12-29 | End: 2018-02-19 | Stop reason: ALTCHOICE

## 2018-01-23 DIAGNOSIS — I10 ESSENTIAL HYPERTENSION: ICD-10-CM

## 2018-01-23 RX ORDER — AMLODIPINE BESYLATE 5 MG/1
TABLET ORAL
Qty: 90 TAB | Refills: 3 | Status: SHIPPED | OUTPATIENT
Start: 2018-01-23 | End: 2019-03-04 | Stop reason: SDUPTHER

## 2018-01-24 RX ORDER — OLMESARTAN MEDOXOMIL AND HYDROCHLOROTHIAZIDE 40/25 40; 25 MG/1; MG/1
TABLET ORAL
Qty: 90 TAB | Refills: 3 | Status: SHIPPED | OUTPATIENT
Start: 2018-01-24 | End: 2019-03-04 | Stop reason: SDUPTHER

## 2018-01-26 DIAGNOSIS — K21.9 GASTROESOPHAGEAL REFLUX DISEASE WITHOUT ESOPHAGITIS: Primary | ICD-10-CM

## 2018-01-26 RX ORDER — OMEPRAZOLE 40 MG/1
40 CAPSULE, DELAYED RELEASE ORAL DAILY
Qty: 30 CAP | Refills: 6 | Status: SHIPPED | OUTPATIENT
Start: 2018-01-26 | End: 2018-04-13 | Stop reason: SDUPTHER

## 2018-02-19 ENCOUNTER — OFFICE VISIT (OUTPATIENT)
Dept: FAMILY MEDICINE CLINIC | Age: 58
End: 2018-02-19

## 2018-02-19 VITALS
RESPIRATION RATE: 18 BRPM | SYSTOLIC BLOOD PRESSURE: 142 MMHG | BODY MASS INDEX: 29.81 KG/M2 | TEMPERATURE: 96.8 F | OXYGEN SATURATION: 98 % | HEART RATE: 78 BPM | WEIGHT: 162 LBS | HEIGHT: 62 IN | DIASTOLIC BLOOD PRESSURE: 80 MMHG

## 2018-02-19 DIAGNOSIS — F32.A MILD DEPRESSION: ICD-10-CM

## 2018-02-19 DIAGNOSIS — I10 ESSENTIAL HYPERTENSION: Primary | ICD-10-CM

## 2018-02-19 DIAGNOSIS — E78.00 HYPERCHOLESTEROLEMIA: ICD-10-CM

## 2018-02-19 DIAGNOSIS — R73.02 IGT (IMPAIRED GLUCOSE TOLERANCE): ICD-10-CM

## 2018-02-19 DIAGNOSIS — Z51.81 ENCOUNTER FOR MEDICATION MONITORING: ICD-10-CM

## 2018-02-19 LAB — HBA1C MFR BLD HPLC: 6.2 %

## 2018-02-19 NOTE — MR AVS SNAPSHOT
[facility] 
 
 
 [address] [facility] [address] 
[phone] Patient: Amanda Thomson MRN: [id number] FLV:[birth year] Visit Information Date & Time Provider Department Dept. Phone Encounter #  
 2/19/2018 11:00 AM Yogesh Osorio Jordan [phone] [id number] Follow-up Instructions Return in about 4 months (around 6/19/2018). Upcoming Health Maintenance Date Due  
 PAP AKA CERVICAL CYTOLOGY 10/14/2017 BREAST CANCER SCRN MAMMOGRAM 7/26/2018 COLONOSCOPY 6/19/2023 DTaP/Tdap/Td series (2 - Td) 8/23/2026 Allergies as of 2/19/2018  Review Complete On: 2/19/2018 By: Ed Rubio MD  
 No Known Allergies Current Immunizations  Reviewed on 8/30/2017 Name Date Influenza Vaccine 9/15/2014 Influenza Vaccine (Quad) PF 8/30/2017 10:29 AM, 8/23/2016, 11/20/2015 Influenza Vaccine Split 10/22/2012, 11/22/2011, 11/5/2010 Tdap 8/23/2016 Not reviewed this visit You Were Diagnosed With   
  
 Codes Comments Essential hypertension    -  Primary ICD-10-CM: I10 
ICD-9-CM: 401.9 IGT (impaired glucose tolerance)     ICD-10-CM: R73.02 
ICD-9-CM: 790.22 Encounter for medication monitoring     ICD-10-CM: Z51.81 
ICD-9-CM: V58.83 Hypercholesterolemia     ICD-10-CM: E78.00 ICD-9-CM: 272.0 Vitals BP Pulse Temp Resp Height(growth percentile) Weight(growth percentile) 142/80 78 96.8 °F (36 °C) (Oral) 18 5' 2\" (1.575 m) 162 lb (73.5 kg) LMP SpO2 BMI OB Status Smoking Status 03/01/2013 98% 29.63 kg/m2 Postmenopausal Never Smoker Vitals History BMI and BSA Data Body Mass Index Body Surface Area  
 29.63 kg/m 2 1.79 m 2 Preferred Pharmacy Pharmacy Name Phone [id number] [place], [address] [place] [address] [phone] Your Updated Medication List  
  
   
 This list is accurate as of: 2/19/18 12:00 PM.  Always use your most recent med list.  
  
  
  
  
 ALPRAZolam 0.5 mg tablet Commonly known as:  Florence Dun Take 1 Tab by mouth nightly as needed for Sleep. Max Daily Amount: 0.5 mg.  
  
 amLODIPine 5 mg tablet Commonly known as:  NORVASC  
take 1 tablet by mouth every morning  
  
 aspirin 81 mg tablet Take 81 mg by mouth daily. escitalopram oxalate 10 mg tablet Commonly known as:  LEXAPRO  
take 1 tablet by mouth once daily  
  
 ibuprofen 800 mg tablet Commonly known as:  MOTRIN  
take 1 tablet by mouth every 8 hours if needed for pain  
  
 olmesartan-hydroCHLOROthiazide 40-25 mg per tablet Commonly known as:  BENICAR HCT  
take 1 tablet by mouth once daily  
  
 omeprazole 40 mg capsule Commonly known as:  PriLOSEC Take 1 Cap by mouth daily. We Performed the Following AMB POC HEMOGLOBIN A1C [38079 CPT(R)] CBC W/O DIFF [13485 CPT(R)] LIPID PANEL [73498 CPT(R)] METABOLIC PANEL, BASIC [06252 CPT(R)] Follow-up Instructions Return in about 4 months (around 6/19/2018). Introducing [address]! Doc Philip introduces [employer] patient portal. Now you can access parts of your medical record, email your doctor's office, and request medication refills online. 1. In your internet browser, go to [email or web address] 2. Click on the First Time User? Click Here link in the Sign In box. You will see the New Member Sign Up page. 3. Enter your [employer] Access Code exactly as it appears below. You will not need to use this code after youve completed the sign-up process. If you do not sign up before the expiration date, you must request a new code. · [employer] Access Code: [email or web address] Expires: 5/20/2018 11:17 AM 
 
4. Enter the last four digits of your Social Security Number (xxxx) and Date of Birth (mm/dd/yyyy) as indicated and click Submit.  You will be taken to the next sign-up page. 5. Create a [employer] ID. This will be your [employer] login ID and cannot be changed, so think of one that is secure and easy to remember. 6. Create a [employer] password. You can change your password at any time. 7. Enter your Password Reset Question and Answer. This can be used at a later time if you forget your password. 8. Enter your e-mail address. You will receive e-mail notification when new information is available in [address] 9. Click Sign Up. You can now view and download portions of your medical record. 10. Click the Download Summary menu link to download a portable copy of your medical information. If you have questions, please visit the Frequently Asked Questions section of the [employer] website. Remember, [employer] is NOT to be used for urgent needs. For medical emergencies, dial 911. Now available from your iPhone and Android! Please provide this summary of care documentation to your next provider. Your primary care clinician is listed as Kannan Chapman. If you have any questions after today's visit, please call [phone].

## 2018-02-19 NOTE — PROGRESS NOTES
Chief Complaint   Patient presents with    Hypertension     6 month follow up     Mammogram- 7/26/16  Coloscopy- 6/19/13-10yrs  Flu-8/30/17  tdap-8/23/16    Pt has not had a recent eye exam. Pt stated will schedule. Paperwork given to pt to take to appt.

## 2018-02-19 NOTE — PROGRESS NOTES
HISTORY OF PRESENT ILLNESS  Toney Griffin is a 65 y.o. female. HPI   For follow up on BP. Doing well. Has no c/o noted today. HTN follow up:  Compliant w/ meds, low salt diet, and exercise. No home bp monitoring. No swelling, headache or dizziness. No chest pain, SOB, palpitations. Otherwise feeling well since the last visit. Glucose intolerance reveiw:  She has IGT. Last a1c 5.9% in April 2015 and August 2016. Diabetic ROS - further diabetic ROS: no polyuria or polydipsia, no chest pain, dyspnea or TIA's, no numbness, tingling or pain in extremities, no unusual visual symptoms. Lab review: orders written for new lab studies as appropriate; see orders  Depression Review:  Patient is seen for followup of depression. Treatment includes Lexapro. Ongoing symptoms include feeling great! Still has insomnia. She denies depressed mood and fatigue. She experiences the following side effects from the treatment: none. Patient Active Problem List   Diagnosis Code    Anemia NEC     Insomnia G47.00    Encounter for medication monitoring Z51.81    Environmental allergies Z91.09    IGT (impaired glucose tolerance) R73.02    Depression F32.9    Essential hypertension I10       Current Outpatient Prescriptions   Medication Sig Dispense Refill    omeprazole (PRILOSEC) 40 mg capsule Take 1 Cap by mouth daily. 30 Cap 6    olmesartan-hydroCHLOROthiazide (BENICAR HCT) 40-25 mg per tablet take 1 tablet by mouth once daily 90 Tab 3    amLODIPine (NORVASC) 5 mg tablet take 1 tablet by mouth every morning 90 Tab 3    ibuprofen (MOTRIN) 800 mg tablet take 1 tablet by mouth every 8 hours if needed for pain 60 Tab 1    escitalopram oxalate (LEXAPRO) 10 mg tablet take 1 tablet by mouth once daily 30 Tab 11    ALPRAZolam (XANAX) 0.5 mg tablet Take 1 Tab by mouth nightly as needed for Sleep. Max Daily Amount: 0.5 mg. 30 Tab 3    aspirin 81 mg tablet Take 81 mg by mouth daily.          No Known Allergies    Past Medical History:   Diagnosis Date    Anemia NEC     Glucose intolerance     HTN (hypertension) 3/26/2010    Insomnia        Past Surgical History:   Procedure Laterality Date    ENDOSCOPY, COLON, DIAGNOSTIC      HX GYN  2006    fibroids removed from uterus    HX TUBAL LIGATION  1993       Family History   Problem Relation Age of Onset    Stroke Mother     Hypertension Sister     Stroke Brother     Hypertension Sister     No Known Problems Sister     No Known Problems Sister     No Known Problems Sister     Stroke Brother        Social History   Substance Use Topics    Smoking status: Never Smoker    Smokeless tobacco: Never Used    Alcohol use 1.5 oz/week     3 Glasses of wine per week     Lab Results  Component Value Date/Time   WBC 3.4 08/30/2017 10:52 AM   HGB 12.6 08/30/2017 10:52 AM   HCT 37.3 08/30/2017 10:52 AM   PLATELET 426 96/59/3786 10:52 AM   MCV 92 08/30/2017 10:52 AM     Lab Results  Component Value Date/Time   Cholesterol, total 227 (H) 08/30/2017 10:52 AM   HDL Cholesterol 69 08/30/2017 10:52 AM   LDL, calculated 134 (H) 08/30/2017 10:52 AM   Triglyceride 120 08/30/2017 10:52 AM   CHOL/HDL Ratio 2.6 05/04/2010 12:30 PM     Lab Results  Component Value Date/Time   TSH 1.820 04/28/2015 04:05 PM   T4, Free 1.15 04/28/2015 04:05 PM      Lab Results   Component Value Date/Time    Sodium 139 08/30/2017 10:52 AM    Potassium 3.8 08/30/2017 10:52 AM    Chloride 95 (L) 08/30/2017 10:52 AM    CO2 28 08/30/2017 10:52 AM    Anion gap 10 05/04/2010 12:30 PM    Glucose 127 (H) 08/30/2017 10:52 AM    BUN 10 08/30/2017 10:52 AM    Creatinine 0.84 08/30/2017 10:52 AM    BUN/Creatinine ratio 12 08/30/2017 10:52 AM    GFR est AA 90 08/30/2017 10:52 AM    GFR est non-AA 78 08/30/2017 10:52 AM    Calcium 10.0 08/30/2017 10:52 AM    Bilirubin, total 0.2 08/30/2017 10:52 AM    ALT (SGPT) 20 08/30/2017 10:52 AM    AST (SGOT) 19 08/30/2017 10:52 AM    Alk.  phosphatase 78 08/30/2017 10:52 AM    Protein, total 8.1 08/30/2017 10:52 AM    Albumin 4.9 08/30/2017 10:52 AM    Globulin 4.0 01/04/2010 12:44 PM    A-G Ratio 1.5 08/30/2017 10:52 AM      Lab Results   Component Value Date/Time    Hemoglobin A1c 5.7 (H) 07/22/2013 02:43 PM    Hemoglobin A1c (POC) 6.2 02/19/2018 12:06 PM           Review of Systems   Constitutional: Negative for malaise/fatigue. HENT: Negative for congestion. Eyes: Negative for blurred vision. Respiratory: Negative for cough and shortness of breath. Cardiovascular: Negative for chest pain, palpitations and leg swelling. Gastrointestinal: Negative for abdominal pain, constipation and heartburn. Genitourinary: Negative for dysuria, frequency and urgency. Musculoskeletal: Negative for back pain and joint pain. Neurological: Negative for dizziness, tingling and headaches. Endo/Heme/Allergies: Negative for environmental allergies. Psychiatric/Behavioral: Negative for depression. The patient does not have insomnia. Physical Exam   Constitutional: She appears well-developed and well-nourished. /80  Pulse 78  Temp 96.8 °F (36 °C) (Oral)   Resp 18  Ht 5' 2\" (1.575 m)  Wt 162 lb (73.5 kg)  LMP 03/01/2013  SpO2 98%  BMI 29.63 kg/m2       HENT:   Right Ear: Tympanic membrane and ear canal normal.   Left Ear: Tympanic membrane and ear canal normal.   Nose: No mucosal edema or rhinorrhea. Mouth/Throat: Oropharynx is clear and moist and mucous membranes are normal.   Neck: Normal range of motion. Neck supple. No thyromegaly present. Cardiovascular: Normal rate and regular rhythm. No murmur heard. Pulmonary/Chest: Effort normal and breath sounds normal.   Abdominal: Soft. Bowel sounds are normal. There is no tenderness. Musculoskeletal: Normal range of motion. She exhibits no edema. Lymphadenopathy:     She has no cervical adenopathy. Skin: Skin is warm and dry. Psychiatric: She has a normal mood and affect.    Nursing note and vitals reviewed. ASSESSMENT and PLAN  Diagnoses and all orders for this visit:    1. Essential hypertension  Wants to work on diet and weight reduction to improve BP. Discussed sodium restriction, high k rich diet, maintaining ideal body weight and regular exercise program such as daily walking 30 min perday 4-5 times per week, as physiologic means to achieve blood pressure control.  Medication compliance advised. 2. Hypercholesterolemia  -     LIPID PANEL    3. IGT (impaired glucose tolerance)  -     AMB POC HEMOGLOBIN A1C    4. Encounter for medication monitoring  -     METABOLIC PANEL, BASIC  -     CBC W/O DIFF    5. Mild Depression  Stable on lexapro    Follow-up Disposition:  Return in about 4 months (around 6/19/2018). reviewed diet, exercise and weight control  cardiovascular risk and specific lipid/LDL goals reviewed  reviewed medications and side effects in detail  specific diabetic recommendations: low cholesterol diet, weight control and daily exercise discussed and glycohemoglobin and other lab monitoring discussed     I have discussed diagnosis listed in this note with pt and/or family. I have discussed treatment plans and options and the risk/benefit analysis of those options, including safe use of medications and possible medication side effects. Through the use of shared decision making we have agreed to the above plan. The patient has received an after-visit summary and questions were answered concerning future plans and follow up. Advise pt of any urgent changes then to proceed to the ER.

## 2018-02-20 LAB
BUN SERPL-MCNC: 13 MG/DL (ref 6–24)
BUN/CREAT SERPL: 17 (ref 9–23)
CALCIUM SERPL-MCNC: 9.8 MG/DL (ref 8.7–10.2)
CHLORIDE SERPL-SCNC: 98 MMOL/L (ref 96–106)
CHOLEST SERPL-MCNC: 232 MG/DL (ref 100–199)
CO2 SERPL-SCNC: 27 MMOL/L (ref 18–29)
CREAT SERPL-MCNC: 0.77 MG/DL (ref 0.57–1)
ERYTHROCYTE [DISTWIDTH] IN BLOOD BY AUTOMATED COUNT: 14.5 % (ref 12.3–15.4)
GFR SERPLBLD CREATININE-BSD FMLA CKD-EPI: 100 ML/MIN/1.73
GFR SERPLBLD CREATININE-BSD FMLA CKD-EPI: 87 ML/MIN/1.73
GLUCOSE SERPL-MCNC: 125 MG/DL (ref 65–99)
HCT VFR BLD AUTO: 35.4 % (ref 34–46.6)
HDLC SERPL-MCNC: 67 MG/DL
HGB BLD-MCNC: 11.9 G/DL (ref 11.1–15.9)
INTERPRETATION, 910389: NORMAL
LDLC SERPL CALC-MCNC: 144 MG/DL (ref 0–99)
MCH RBC QN AUTO: 30.9 PG (ref 26.6–33)
MCHC RBC AUTO-ENTMCNC: 33.6 G/DL (ref 31.5–35.7)
MCV RBC AUTO: 92 FL (ref 79–97)
PLATELET # BLD AUTO: 272 X10E3/UL (ref 150–379)
POTASSIUM SERPL-SCNC: 4.1 MMOL/L (ref 3.5–5.2)
RBC # BLD AUTO: 3.85 X10E6/UL (ref 3.77–5.28)
SODIUM SERPL-SCNC: 143 MMOL/L (ref 134–144)
TRIGL SERPL-MCNC: 107 MG/DL (ref 0–149)
VLDLC SERPL CALC-MCNC: 21 MG/DL (ref 5–40)
WBC # BLD AUTO: 4.3 X10E3/UL (ref 3.4–10.8)

## 2018-03-13 RX ORDER — ESCITALOPRAM OXALATE 10 MG/1
TABLET ORAL
Qty: 30 TAB | Refills: 11 | Status: SHIPPED | OUTPATIENT
Start: 2018-03-13 | End: 2019-03-18 | Stop reason: SDUPTHER

## 2018-03-13 NOTE — TELEPHONE ENCOUNTER
----- Message from [facility] sent at 3/13/2018  9:27 AM EDT -----  Regarding: Dr. Lily Viera Georgina/Telephone  Pt is checking on the status of her refill for \"Lexapro\" [facility] [address] on [facility]. Pt would like a call back about the status because she said the pharmacist put the request in about a week ago. Pt best contact [phone]. Pt stated she is out of her medication.

## 2018-03-27 ENCOUNTER — TELEPHONE (OUTPATIENT)
Dept: FAMILY MEDICINE CLINIC | Age: 58
End: 2018-03-27

## 2018-03-27 NOTE — TELEPHONE ENCOUNTER
----- Message from Emery Odonnell MD sent at 3/15/2018  9:38 AM EDT -----  Please call pt and get her schedule for an appt about mid July to repeat her BS level.

## 2018-04-13 DIAGNOSIS — K21.9 GASTROESOPHAGEAL REFLUX DISEASE WITHOUT ESOPHAGITIS: ICD-10-CM

## 2018-04-13 RX ORDER — OMEPRAZOLE 40 MG/1
40 CAPSULE, DELAYED RELEASE ORAL DAILY
Qty: 90 CAP | Refills: 3 | Status: SHIPPED | OUTPATIENT
Start: 2018-04-13 | End: 2018-08-27 | Stop reason: SDUPTHER

## 2018-07-16 ENCOUNTER — OFFICE VISIT (OUTPATIENT)
Dept: FAMILY MEDICINE CLINIC | Age: 58
End: 2018-07-16

## 2018-07-16 VITALS
WEIGHT: 156.6 LBS | DIASTOLIC BLOOD PRESSURE: 77 MMHG | SYSTOLIC BLOOD PRESSURE: 138 MMHG | BODY MASS INDEX: 28.82 KG/M2 | OXYGEN SATURATION: 98 % | HEART RATE: 76 BPM | HEIGHT: 62 IN | RESPIRATION RATE: 16 BRPM | TEMPERATURE: 98.7 F

## 2018-07-16 DIAGNOSIS — I10 ESSENTIAL HYPERTENSION: Primary | ICD-10-CM

## 2018-07-16 DIAGNOSIS — Z51.81 ENCOUNTER FOR MEDICATION MONITORING: ICD-10-CM

## 2018-07-16 DIAGNOSIS — F32.A MILD DEPRESSION: ICD-10-CM

## 2018-07-16 DIAGNOSIS — H10.13 ALLERGIC CONJUNCTIVITIS OF BOTH EYES: ICD-10-CM

## 2018-07-16 DIAGNOSIS — R73.02 IGT (IMPAIRED GLUCOSE TOLERANCE): ICD-10-CM

## 2018-07-16 DIAGNOSIS — E78.2 MIXED HYPERLIPIDEMIA: ICD-10-CM

## 2018-07-16 LAB
GLUCOSE POC: 133 MG/DL
HBA1C MFR BLD HPLC: 6.1 %

## 2018-07-16 RX ORDER — IBUPROFEN 800 MG/1
TABLET ORAL
Qty: 90 TAB | Refills: 1 | Status: SHIPPED | OUTPATIENT
Start: 2018-07-16 | End: 2020-01-03

## 2018-07-16 RX ORDER — KETOTIFEN FUMARATE 0.35 MG/ML
1 SOLUTION/ DROPS OPHTHALMIC 2 TIMES DAILY
Qty: 10 ML | Refills: 0
Start: 2018-07-16 | End: 2018-07-26

## 2018-07-16 NOTE — MR AVS SNAPSHOT
[address] [facility] [place] 
 
 
 [address] [facility] [place] 7 [address] 
[phone] Patient: Camryn Reyes MRN: [id number] NKP:[birth year] Visit Information Date & Time Provider Department Dept. Phone Encounter #  
 7/16/2018  7:45 AM Mariely Garg MD [facility] [phone] [id number] Follow-up Instructions Return in about 5 months (around 12/3/2018). Upcoming Health Maintenance Date Due Influenza Age 5 to Adult 8/1/2018 BREAST CANCER SCRN MAMMOGRAM 9/8/2019 PAP AKA CERVICAL CYTOLOGY 2/1/2021 COLONOSCOPY 6/19/2023 DTaP/Tdap/Td series (2 - Td) 8/23/2026 Allergies as of 7/16/2018  Review Complete On: 7/16/2018 By: Mariely Garg MD  
 No Known Allergies Current Immunizations  Reviewed on 7/16/2018 Name Date Influenza Vaccine 9/15/2014 Influenza Vaccine (Quad) PF 8/30/2017 10:29 AM, 8/23/2016, 11/20/2015 Influenza Vaccine Split 10/22/2012, 11/22/2011, 11/5/2010 Tdap 8/23/2016 Reviewed by Mariely Garg MD on 7/16/2018 at  8:07 AM  
You Were Diagnosed With   
  
 Codes Comments Essential hypertension    -  Primary ICD-10-CM: I10 
ICD-9-CM: 401.9 Mixed hyperlipidemia     ICD-10-CM: E78.2 ICD-9-CM: 272.2 IGT (impaired glucose tolerance)     ICD-10-CM: R73.02 
ICD-9-CM: 790.22 Encounter for medication monitoring     ICD-10-CM: Z51.81 
ICD-9-CM: V58.83 Vitals BP Pulse Temp Resp Height(growth percentile) Weight(growth percentile) 138/77 (BP 1 Location: Right arm, BP Patient Position: Sitting) 76 98.7 °F (37.1 °C) (Oral) 16 5' 2\" (1.575 m) 156 lb 9.6 oz (71 kg) LMP SpO2 BMI OB Status Smoking Status 03/01/2013 98% 28.64 kg/m2 Postmenopausal Never Smoker Vitals History BMI and BSA Data Body Mass Index Body Surface Area  
 28.64 kg/m 2 1.76 m 2 Preferred Pharmacy Pharmacy Name Phone [id number] [place], [address] [facility] [phone] Your Updated Medication List  
  
   
This list is accurate as of 7/16/18  8:32 AM.  Always use your most recent med list.  
  
  
  
  
 ALPRAZolam 0.5 mg tablet Commonly known as:  Hernandes Leaks Take 1 Tab by mouth nightly as needed for Sleep. Max Daily Amount: 0.5 mg.  
  
 amLODIPine 5 mg tablet Commonly known as:  NORVASC  
take 1 tablet by mouth every morning  
  
 aspirin 81 mg tablet Take 81 mg by mouth daily. escitalopram oxalate 10 mg tablet Commonly known as:  LEXAPRO  
take 1 tablet by mouth once daily  
  
 ibuprofen 800 mg tablet Commonly known as:  MOTRIN  
take 1 tablet by mouth every 8 hours if needed for pain  
  
 olmesartan-hydroCHLOROthiazide 40-25 mg per tablet Commonly known as:  BENICAR HCT  
take 1 tablet by mouth once daily  
  
 omeprazole 40 mg capsule Commonly known as:  PriLOSEC Take 1 Cap by mouth daily. Prescriptions Sent to Pharmacy Refills  
 ibuprofen (MOTRIN) 800 mg tablet 1 Sig: take 1 tablet by mouth every 8 hours if needed for pain  
 Class: Normal  
 Pharmacy: [facility] [facility] Ph #: [phone] We Performed the Following AMB POC GLUCOSE, QUANTITATIVE, BLOOD [85508 CPT(R)] AMB POC HEMOGLOBIN A1C [80388 CPT(R)] LIPID PANEL [01581 CPT(R)] METABOLIC PANEL, COMPREHENSIVE [69658 CPT(R)] Follow-up Instructions Return in about 5 months (around 12/3/2018). Introducing [facility]! Kp Duron introduces [employer] patient portal. Now you can access parts of your medical record, email your doctor's office, and request medication refills online. 1. In your internet browser, go to [email or web address] 2. Click on the First Time User? Click Here link in the Sign In box. You will see the New Member Sign Up page. 3. Enter your [employer] Access Code exactly as it appears below. You will not need to use this code after youve completed the sign-up process. If you do not sign up before the expiration date, you must request a new code. · [employer] Access Code: [email or web address] Expires: 10/14/2018  7:23 AM 
 
4. Enter the last four digits of your Social Security Number (xxxx) and Date of Birth (mm/dd/yyyy) as indicated and click Submit. You will be taken to the next sign-up page. 5. Create a [employer] ID. This will be your [employer] login ID and cannot be changed, so think of one that is secure and easy to remember. 6. Create a [employer] password. You can change your password at any time. 7. Enter your Password Reset Question and Answer. This can be used at a later time if you forget your password. 8. Enter your e-mail address. You will receive e-mail notification when new information is available in [address] 9. Click Sign Up. You can now view and download portions of your medical record. 10. Click the Download Summary menu link to download a portable copy of your medical information. If you have questions, please visit the Frequently Asked Questions section of the [employer] website. Remember, [employer] is NOT to be used for urgent needs. For medical emergencies, dial 911. Now available from your iPhone and Android! Please provide this summary of care documentation to your next provider. Your primary care clinician is listed as Aminah Lagos. If you have any questions after today's visit, please call [phone].

## 2018-07-16 NOTE — PROGRESS NOTES
Chief Complaint   Patient presents with    Hypertension     follow up   [address] [place] [address]     pt c/o both eyes being itchy         Mammogram 9/8/2017    Colonoscopy 6/19/2013 repeat in 10 years    1. Have you been to the ER, urgent care clinic since your last visit? Hospitalized since your last visit? No    2. Have you seen or consulted any other health care providers outside of the [address] [place] [address] since your last visit? Include any pap smears or colon screening.  No

## 2018-07-16 NOTE — PROGRESS NOTES
HISTORY OF PRESENT ILLNESS  Unknown Geena is a 65 y.o. female. HPI   For follow up on BP. Doing well. Has no c/o noted today. HTN follow up:  Compliant w/ meds, low salt diet, and exercise. No home bp monitoring. No swelling, headache or dizziness. No chest pain, SOB, palpitations. Otherwise feeling well since the last visit. Glucose intolerance reveiw:  She has IGT. Diabetic ROS - further diabetic ROS: no polyuria or polydipsia, no chest pain, dyspnea or TIA's, no numbness, tingling or pain in extremities, no unusual visual symptoms. Lab review: orders written for new lab studies as appropriate; see orders  Depression Review:  Patient is seen for followup of depression. Treatment includes Lexapro. Ongoing symptoms include feeling great! Still has insomnia. She denies depressed mood and fatigue. She experiences the following side effects from the treatment: none. Patient Active Problem List   Diagnosis Code    Anemia NEC     Insomnia G47.00    Encounter for medication monitoring Z51.81    Environmental allergies Z91.09    IGT (impaired glucose tolerance) R73.02    Essential hypertension I10    Mild depression (HCC) F32.0       Current Outpatient Prescriptions   Medication Sig Dispense Refill    ibuprofen (MOTRIN) 800 mg tablet take 1 tablet by mouth every 8 hours if needed for pain 90 Tab 1    ketotifen (ZADITOR) 0.025 % (0.035 %) ophthalmic solution Administer 1 Drop to both eyes two (2) times a day for 10 days. 10 mL 0    omeprazole (PRILOSEC) 40 mg capsule Take 1 Cap by mouth daily. 90 Cap 3    escitalopram oxalate (LEXAPRO) 10 mg tablet take 1 tablet by mouth once daily 30 Tab 11    olmesartan-hydroCHLOROthiazide (BENICAR HCT) 40-25 mg per tablet take 1 tablet by mouth once daily 90 Tab 3    amLODIPine (NORVASC) 5 mg tablet take 1 tablet by mouth every morning 90 Tab 3    ALPRAZolam (XANAX) 0.5 mg tablet Take 1 Tab by mouth nightly as needed for Sleep.  Max Daily Amount: 0.5 mg. 30 Tab 3    aspirin 81 mg tablet Take 81 mg by mouth daily.          No Known Allergies    Past Medical History:   Diagnosis Date    Anemia NEC     Glucose intolerance     HTN (hypertension) 3/26/2010    Insomnia        Past Surgical History:   Procedure Laterality Date    ENDOSCOPY, COLON, DIAGNOSTIC      HX GYN  2006    fibroids removed from uterus    HX TUBAL LIGATION  1993       Family History   Problem Relation Age of Onset    Stroke Mother     Hypertension Sister     Stroke Brother     Hypertension Sister     No Known Problems Sister     No Known Problems Sister     No Known Problems Sister     Stroke Brother        Social History   Substance Use Topics    Smoking status: Never Smoker    Smokeless tobacco: Never Used    Alcohol use 3.0 oz/week     5 Glasses of wine per week        Lab Results  Component Value Date/Time   WBC 4.3 02/19/2018 12:06 PM   HGB 11.9 02/19/2018 12:06 PM   HCT 35.4 02/19/2018 12:06 PM   PLATELET 754 69/25/0824 12:06 PM   MCV 92 02/19/2018 12:06 PM     Lab Results  Component Value Date/Time   Cholesterol, total 232 (H) 02/19/2018 12:06 PM   HDL Cholesterol 67 02/19/2018 12:06 PM   LDL, calculated 144 (H) 02/19/2018 12:06 PM   Triglyceride 107 02/19/2018 12:06 PM   CHOL/HDL Ratio 2.6 05/04/2010 12:30 PM     Lab Results  Component Value Date/Time   TSH 1.820 04/28/2015 04:05 PM   T4, Free 1.15 04/28/2015 04:05 PM      Lab Results   Component Value Date/Time    Sodium 143 02/19/2018 12:06 PM    Potassium 4.1 02/19/2018 12:06 PM    Chloride 98 02/19/2018 12:06 PM    CO2 27 02/19/2018 12:06 PM    Anion gap 10 05/04/2010 12:30 PM    Glucose 125 (H) 02/19/2018 12:06 PM    BUN 13 02/19/2018 12:06 PM    Creatinine 0.77 02/19/2018 12:06 PM    BUN/Creatinine ratio 17 02/19/2018 12:06 PM    GFR est  02/19/2018 12:06 PM    GFR est non-AA 87 02/19/2018 12:06 PM    Calcium 9.8 02/19/2018 12:06 PM    Bilirubin, total 0.2 08/30/2017 10:52 AM    ALT (SGPT) 20 08/30/2017 10:52 AM    AST (SGOT) 19 08/30/2017 10:52 AM    Alk. phosphatase 78 08/30/2017 10:52 AM    Protein, total 8.1 08/30/2017 10:52 AM    Albumin 4.9 08/30/2017 10:52 AM    Globulin 4.0 01/04/2010 12:44 PM    A-G Ratio 1.5 08/30/2017 10:52 AM      Lab Results   Component Value Date/Time    Hemoglobin A1c 5.7 (H) 07/22/2013 02:43 PM    Hemoglobin A1c (POC) 6.1 07/16/2018 08:23 AM         Review of Systems   Constitutional: Negative for malaise/fatigue. HENT: Negative for congestion. Eyes: Negative for blurred vision. Itchy eyes from her allergies. Respiratory: Negative for cough and shortness of breath. Cardiovascular: Negative for chest pain, palpitations and leg swelling. Gastrointestinal: Negative for abdominal pain, constipation and heartburn. Genitourinary: Negative for dysuria, frequency and urgency. Musculoskeletal: Negative for back pain and joint pain. Neurological: Negative for dizziness, tingling and headaches. Endo/Heme/Allergies: Negative for environmental allergies. Psychiatric/Behavioral: Negative for depression. The patient does not have insomnia. Physical Exam   Constitutional: She appears well-developed and well-nourished. /77 (BP 1 Location: Right arm, BP Patient Position: Sitting)  Pulse 76  Temp 98.7 °F (37.1 °C) (Oral)   Resp 16  Ht 5' 2\" (1.575 m)  Wt 156 lb 9.6 oz (71 kg)  LMP 03/01/2013  SpO2 98%  BMI 28.64 kg/m2   HENT:   Right Ear: Tympanic membrane and ear canal normal.   Left Ear: Tympanic membrane and ear canal normal.   Nose: No mucosal edema or rhinorrhea. Mouth/Throat: Oropharynx is clear and moist and mucous membranes are normal.   Neck: Normal range of motion. Neck supple. No thyromegaly present. Cardiovascular: Normal rate and regular rhythm. No murmur heard. Pulmonary/Chest: Effort normal and breath sounds normal.   Abdominal: Soft. Bowel sounds are normal. There is no tenderness.    Musculoskeletal: Normal range of motion. She exhibits no edema. Lymphadenopathy:     She has no cervical adenopathy. Skin: Skin is warm and dry. Psychiatric: She has a normal mood and affect. Nursing note and vitals reviewed. ASSESSMENT and PLAN  Diagnoses and all orders for this visit:    1. Essential hypertension  Discussed sodium restriction, high k rich diet, maintaining ideal body weight and regular exercise program such as daily walking 30 min perday 4-5 times per week, as physiologic means to achieve blood pressure control.  Medication compliance advised. 2. Mixed hyperlipidemia  -     LIPID PANEL    3. IGT (impaired glucose tolerance)  -     AMB POC HEMOGLOBIN A1C  -     AMB POC GLUCOSE, QUANTITATIVE, BLOOD  Continue to monitor. Work on diet and exercise. 4. Mild depression ([address]  Stable on lexapro. 5. Encounter for medication monitoring  -     METABOLIC PANEL, COMPREHENSIVE    6. Allergic conjunctivitis of both eyes  -     ketotifen (ZADITOR) 0.025 % (0.035 %) ophthalmic solution; Administer 1 Drop to both eyes two (2) times a day for 10 days. Other orders  -     ibuprofen (MOTRIN) 800 mg tablet; take 1 tablet by mouth every 8 hours if needed for pain      Follow-up Disposition:  Return in about 5 months (around 12/3/2018). reviewed diet, exercise and weight control  cardiovascular risk and specific lipid/LDL goals reviewed  reviewed medications and side effects in detail  specific diabetic recommendations: low cholesterol diet, weight control and daily exercise discussed and glycohemoglobin and other lab monitoring discussed     I have discussed diagnosis listed in this note with pt and/or family. I have discussed treatment plans and options and the risk/benefit analysis of those options, including safe use of medications and possible medication side effects. Through the use of shared decision making we have agreed to the above plan.  The patient has received an after-visit summary and questions were answered concerning future plans and follow up. Advise pt of any urgent changes then to proceed to the ER.

## 2018-07-18 LAB
ALBUMIN SERPL-MCNC: 4.9 G/DL (ref 3.5–5.5)
ALBUMIN/GLOB SERPL: 1.8 {RATIO} (ref 1.2–2.2)
ALP SERPL-CCNC: 76 IU/L (ref 39–117)
ALT SERPL-CCNC: 20 IU/L (ref 0–32)
AST SERPL-CCNC: 21 IU/L (ref 0–40)
BILIRUB SERPL-MCNC: 0.3 MG/DL (ref 0–1.2)
BUN SERPL-MCNC: 15 MG/DL (ref 6–24)
BUN/CREAT SERPL: 18 (ref 9–23)
CALCIUM SERPL-MCNC: 10.2 MG/DL (ref 8.7–10.2)
CHLORIDE SERPL-SCNC: 98 MMOL/L (ref 96–106)
CHOLEST SERPL-MCNC: 231 MG/DL (ref 100–199)
CO2 SERPL-SCNC: 26 MMOL/L (ref 20–29)
CREAT SERPL-MCNC: 0.85 MG/DL (ref 0.57–1)
GLOBULIN SER CALC-MCNC: 2.8 G/DL (ref 1.5–4.5)
GLUCOSE SERPL-MCNC: 132 MG/DL (ref 65–99)
HDLC SERPL-MCNC: 57 MG/DL
INTERPRETATION, 910389: NORMAL
LDLC SERPL CALC-MCNC: 146 MG/DL (ref 0–99)
POTASSIUM SERPL-SCNC: 4.3 MMOL/L (ref 3.5–5.2)
PROT SERPL-MCNC: 7.7 G/DL (ref 6–8.5)
SODIUM SERPL-SCNC: 143 MMOL/L (ref 134–144)
TRIGL SERPL-MCNC: 142 MG/DL (ref 0–149)
VLDLC SERPL CALC-MCNC: 28 MG/DL (ref 5–40)

## 2018-08-09 RX ORDER — ATORVASTATIN CALCIUM 10 MG/1
10 TABLET, FILM COATED ORAL DAILY
Qty: 30 TAB | Refills: 3 | Status: SHIPPED | OUTPATIENT
Start: 2018-08-09 | End: 2019-02-15 | Stop reason: SDUPTHER

## 2018-08-27 DIAGNOSIS — K21.9 GASTROESOPHAGEAL REFLUX DISEASE WITHOUT ESOPHAGITIS: ICD-10-CM

## 2018-08-27 RX ORDER — OMEPRAZOLE 40 MG/1
40 CAPSULE, DELAYED RELEASE ORAL DAILY
Qty: 90 CAP | Refills: 3 | Status: SHIPPED | OUTPATIENT
Start: 2018-08-27 | End: 2019-07-10 | Stop reason: SDUPTHER

## 2018-09-04 ENCOUNTER — TELEPHONE (OUTPATIENT)
Dept: FAMILY MEDICINE CLINIC | Age: 58
End: 2018-09-04

## 2018-09-04 NOTE — TELEPHONE ENCOUNTER
Per Fax: Veatrice Goodpasture 40-25 MG is on Backorder. [facility] would like to know which medication to switch the patient to. Please advise.

## 2018-09-04 NOTE — TELEPHONE ENCOUNTER
Per Dr. Kathi Sierra see if another [facility] has it or another pharm.  Dr. Kathi Sierra patient has been on medication for a long time and would not like to change

## 2019-02-15 RX ORDER — ATORVASTATIN CALCIUM 10 MG/1
TABLET, FILM COATED ORAL
Qty: 30 TAB | Refills: 3 | Status: SHIPPED | OUTPATIENT
Start: 2019-02-15 | End: 2019-06-29 | Stop reason: SDUPTHER

## 2019-03-04 DIAGNOSIS — I10 ESSENTIAL HYPERTENSION: ICD-10-CM

## 2019-03-05 RX ORDER — OLMESARTAN MEDOXOMIL AND HYDROCHLOROTHIAZIDE 40/25 40; 25 MG/1; MG/1
TABLET ORAL
Qty: 90 TAB | Refills: 3 | Status: SHIPPED | OUTPATIENT
Start: 2019-03-05 | End: 2020-05-11

## 2019-03-05 RX ORDER — AMLODIPINE BESYLATE 5 MG/1
TABLET ORAL
Qty: 90 TAB | Refills: 3 | Status: SHIPPED | OUTPATIENT
Start: 2019-03-05 | End: 2020-06-01 | Stop reason: SDUPTHER

## 2019-03-18 RX ORDER — ESCITALOPRAM OXALATE 10 MG/1
TABLET ORAL
Qty: 30 TAB | Refills: 10 | Status: SHIPPED | OUTPATIENT
Start: 2019-03-18 | End: 2019-04-30 | Stop reason: SDUPTHER

## 2019-04-30 ENCOUNTER — OFFICE VISIT (OUTPATIENT)
Dept: FAMILY MEDICINE CLINIC | Age: 59
End: 2019-04-30

## 2019-04-30 VITALS
HEIGHT: 62 IN | RESPIRATION RATE: 16 BRPM | WEIGHT: 152 LBS | BODY MASS INDEX: 27.97 KG/M2 | DIASTOLIC BLOOD PRESSURE: 73 MMHG | TEMPERATURE: 98.1 F | SYSTOLIC BLOOD PRESSURE: 131 MMHG | OXYGEN SATURATION: 99 % | HEART RATE: 86 BPM

## 2019-04-30 DIAGNOSIS — Z23 NEED FOR SHINGLES VACCINE: ICD-10-CM

## 2019-04-30 DIAGNOSIS — F43.21 GRIEF AT LOSS OF CHILD: ICD-10-CM

## 2019-04-30 DIAGNOSIS — Z63.4 GRIEF AT LOSS OF CHILD: ICD-10-CM

## 2019-04-30 DIAGNOSIS — E78.2 MIXED HYPERLIPIDEMIA: ICD-10-CM

## 2019-04-30 DIAGNOSIS — Z91.09 ENVIRONMENTAL ALLERGIES: ICD-10-CM

## 2019-04-30 DIAGNOSIS — F32.A MILD DEPRESSION: ICD-10-CM

## 2019-04-30 DIAGNOSIS — I10 ESSENTIAL HYPERTENSION: Primary | ICD-10-CM

## 2019-04-30 DIAGNOSIS — R73.02 IGT (IMPAIRED GLUCOSE TOLERANCE): ICD-10-CM

## 2019-04-30 DIAGNOSIS — Z51.81 ENCOUNTER FOR MEDICATION MONITORING: ICD-10-CM

## 2019-04-30 LAB
GLUCOSE POC: 98 MG/DL
HBA1C MFR BLD HPLC: 6 %

## 2019-04-30 RX ORDER — ESCITALOPRAM OXALATE 10 MG/1
20 TABLET ORAL DAILY
Qty: 60 TAB | Refills: 10 | Status: SHIPPED | OUTPATIENT
Start: 2019-04-30 | End: 2019-07-26 | Stop reason: SDUPTHER

## 2019-04-30 SDOH — SOCIAL STABILITY - SOCIAL INSECURITY: DISSAPEARANCE AND DEATH OF FAMILY MEMBER: Z63.4

## 2019-04-30 NOTE — PROGRESS NOTES
HISTORY OF PRESENT ILLNESS Lizzeth Simmons is a 63 y.o. female. For follow up on BP. Suffering the grief of lost of her 22year old dtg being murdered by her boyfriend in a murder-suicide this past January 1. Has been very difficult day to day. \"My heart is broken. \"  She is in counseling and will start with attending a support group on tomorrow. Her therapist has recommend increased in her lexapro. She is using tylenol PM to help with sleep. HTN follow up: 
Compliant w/ meds, low salt diet, and exercise. No home bp monitoring. No swelling, headache or dizziness. No chest pain, SOB, palpitations. Otherwise feeling well since the last visit. Glucose intolerance reveiw: She has IGT. Diabetic ROS - further diabetic ROS: no polyuria or polydipsia, no chest pain, dyspnea or TIA's, no numbness, tingling or pain in extremities, no unusual visual symptoms. Lab review: orders written for new lab studies as appropriate; see orders Depression Review: 
Patient is seen for followup of depression. Treatment includes Lexapro. Still has insomnia. She has depressed mood and sadness. She sometime losses her focus even with driving. She forgets where she is going from thinking about the circumstances. Family is supportive. She experiences the following side effects from the treatment: none. Patient Active Problem List  
Diagnosis Code  Anemia NEC   
 Insomnia G47.00  
 Encounter for medication monitoring Z51.81  
 Environmental allergies Z91.09  
 IGT (impaired glucose tolerance) R73.02  
 Essential hypertension I10  
 Mild depression (HCC) F32.0 Current Outpatient Medications Medication Sig Dispense Refill  varicella-zoster recombinant, PF, (SHINGRIX) 50 mcg/0.5 mL susr injection 0.5 mL by IntraMUSCular route once for 1 dose. Repeat second dose in 6 months. 0.5 mL 1  
 escitalopram oxalate (LEXAPRO) 10 mg tablet Take 2 Tabs by mouth daily.  60 Tab 10  
  amLODIPine (NORVASC) 5 mg tablet TAKE 1 TABLET BY MOUTH EVERY MORNING 90 Tab 3  
 olmesartan-hydroCHLOROthiazide (BENICAR HCT) 40-25 mg per tablet TAKE 1 TABLET BY MOUTH ONCE DAILY 90 Tab 3  
 atorvastatin (LIPITOR) 10 mg tablet TAKE 1 TABLET BY MOUTH EVERY DAY 30 Tab 3  
 omeprazole (PRILOSEC) 40 mg capsule Take 1 Cap by mouth daily. 90 Cap 3  ibuprofen (MOTRIN) 800 mg tablet take 1 tablet by mouth every 8 hours if needed for pain 90 Tab 1  
 aspirin 81 mg tablet Take 81 mg by mouth daily. No Known Allergies Past Medical History:  
Diagnosis Date  Anemia NEC   
 Glucose intolerance  HTN (hypertension) 3/26/2010  Insomnia Past Surgical History:  
Procedure Laterality Date  ENDOSCOPY, COLON, DIAGNOSTIC    
 HX GYN  2006  
 fibroids removed from uterus [place] Family History Problem Relation Age of Onset  Stroke Mother  Hypertension Sister  Stroke Brother  Hypertension Sister  No Known Problems Sister  No Known Problems Sister  No Known Problems Sister  Stroke Brother Social History Tobacco Use  Smoking status: Never Smoker  Smokeless tobacco: Never Used Substance Use Topics  Alcohol use: Yes Alcohol/week: 3.0 oz Types: 5 Glasses of wine per week Lab Results Component Value Date/Time WBC 4.3 02/19/2018 12:06 PM  
 HGB 11.9 02/19/2018 12:06 PM  
 HCT 35.4 02/19/2018 12:06 PM  
 PLATELET 821 99/77/1936 12:06 PM  
 MCV 92 02/19/2018 12:06 PM  
 
Lab Results Component Value Date/Time Cholesterol, total 231 (H) 07/16/2018 08:23 AM  
 HDL Cholesterol 57 07/16/2018 08:23 AM  
 LDL, calculated 146 (H) 07/16/2018 08:23 AM  
 Triglyceride 142 07/16/2018 08:23 AM  
 CHOL/HDL Ratio 2.6 05/04/2010 12:30 PM  
 
Lab Results Component Value Date/Time TSH 1.820 04/28/2015 04:05 PM  
 T4, Free 1.15 04/28/2015 04:05 PM  
  
Lab Results Component Value Date/Time Sodium 143 07/16/2018 08:23 AM  
 Potassium 4.3 07/16/2018 08:23 AM  
 Chloride 98 07/16/2018 08:23 AM  
 CO2 26 07/16/2018 08:23 AM  
 Anion gap 10 05/04/2010 12:30 PM  
 Glucose 132 (H) 07/16/2018 08:23 AM  
 BUN 15 07/16/2018 08:23 AM  
 Creatinine 0.85 07/16/2018 08:23 AM  
 BUN/Creatinine ratio 18 07/16/2018 08:23 AM  
 GFR est AA 89 07/16/2018 08:23 AM  
 GFR est non-AA 77 07/16/2018 08:23 AM  
 Calcium 10.2 07/16/2018 08:23 AM  
 Bilirubin, total 0.3 07/16/2018 08:23 AM  
 ALT (SGPT) 20 07/16/2018 08:23 AM  
 AST (SGOT) 21 07/16/2018 08:23 AM  
 Alk. phosphatase 76 07/16/2018 08:23 AM  
 Protein, total 7.7 07/16/2018 08:23 AM  
 Albumin 4.9 07/16/2018 08:23 AM  
 Globulin 4.0 01/04/2010 12:44 PM  
 A-G Ratio 1.8 07/16/2018 08:23 AM  
  
Lab Results Component Value Date/Time Hemoglobin A1c 5.7 (H) 07/22/2013 02:43 PM  
 Hemoglobin A1c (POC) 6.1 07/16/2018 08:23 AM  
   
 
Review of Systems Constitutional: Negative for malaise/fatigue. HENT: Negative for congestion. Eyes: Negative for blurred vision. Itchy eyes from her allergies. Respiratory: Negative for cough and shortness of breath. Cardiovascular: Negative for chest pain, palpitations and leg swelling. Gastrointestinal: Negative for abdominal pain, constipation and heartburn. Genitourinary: Negative for dysuria, frequency and urgency. Musculoskeletal: Negative for back pain and joint pain. Neurological: Negative for dizziness, tingling and headaches. Endo/Heme/Allergies: Positive for environmental allergies. Psychiatric/Behavioral: Negative for depression. The patient does not have insomnia. Physical Exam  
Constitutional: She appears well-developed and well-nourished. /73 (BP 1 Location: Left arm, BP Patient Position: Sitting)   Pulse 86   Temp 98.1 °F (36.7 °C) (Oral)   Resp 16   Ht 5' 2\" (1.575 m)   Wt 152 lb (68.9 kg)   LMP 03/01/2013   SpO2 99%   BMI 27.80 kg/m² HENT:  
 Right Ear: Tympanic membrane and ear canal normal.  
Left Ear: Tympanic membrane and ear canal normal.  
Nose: Rhinorrhea present. No mucosal edema. Mouth/Throat: Oropharynx is clear and moist and mucous membranes are normal.  
Neck: Normal range of motion. Neck supple. No thyromegaly present. Cardiovascular: Normal rate and regular rhythm. No murmur heard. Pulmonary/Chest: Effort normal and breath sounds normal.  
Abdominal: Soft. Bowel sounds are normal. There is no tenderness. Musculoskeletal: Normal range of motion. She exhibits no edema. Lymphadenopathy:  
  She has no cervical adenopathy. Skin: Skin is warm and dry. Psychiatric: Her speech is normal and behavior is normal. Judgment and thought content normal. Cognition and memory are normal. She exhibits a depressed mood. Nursing note and vitals reviewed. ASSESSMENT and PLAN Diagnoses and all orders for this visit: 1. Essential hypertension Stable 2. Mixed hyperlipidemia -     LIPID PANEL 
 
3. IGT (impaired glucose tolerance) -     AMB POC HEMOGLOBIN A1C 
-     AMB POC GLUCOSE, QUANTITATIVE, BLOOD 4. Mild depression (HCC)// 
5. Grief at loss of child -    Increase escitalopram oxalate (LEXAPRO) 10 mg tablet; Take 2 Tabs by mouth daily. Continue with her counseling and support group. 6. Environmental allergies OTC claritin as needed. 7. Encounter for medication monitoring -     METABOLIC PANEL, COMPREHENSIVE 8. Need for shingles vaccine 
-     varicella-zoster recombinant, PF, (SHINGRIX) 50 mcg/0.5 mL susr injection; 0.5 mL by IntraMUSCular route once for 1 dose. Repeat second dose in 6 months.  
 
 
 
follow up 3 months  
reviewed diet, exercise and weight control 
cardiovascular risk and specific lipid/LDL goals reviewed 
reviewed medications and side effects in detail 
specific diabetic recommendations: low cholesterol diet, weight control and daily exercise discussed and glycohemoglobin and other lab monitoring discussed I have discussed diagnosis listed in this note with pt and/or family. I have discussed treatment plans and options and the risk/benefit analysis of those options, including safe use of medications and possible medication side effects. Through the use of shared decision making we have agreed to the above plan. The patient has received an after-visit summary and questions were answered concerning future plans and follow up. Advise pt of any urgent changes then to proceed to the ER.

## 2019-04-30 NOTE — PROGRESS NOTES
Chief Complaint Patient presents with  Hypertension  
  follow up Mammogram 2/14/2019 Colonoscopy 6/19/2013 by Dr. Qi Villa repeat in 10 years 1. Have you been to the ER, urgent care clinic since your last visit? Hospitalized since your last visit? No 
 
2. Have you seen or consulted any other health care providers outside of the [address] [place] [address] since your last visit? Include any pap smears or colon screening.  No

## 2019-05-01 LAB
ALBUMIN SERPL-MCNC: 5 G/DL (ref 3.5–5.5)
ALBUMIN/GLOB SERPL: 1.7 {RATIO} (ref 1.2–2.2)
ALP SERPL-CCNC: 71 IU/L (ref 39–117)
ALT SERPL-CCNC: 16 IU/L (ref 0–32)
AST SERPL-CCNC: 16 IU/L (ref 0–40)
BILIRUB SERPL-MCNC: 0.3 MG/DL (ref 0–1.2)
BUN SERPL-MCNC: 15 MG/DL (ref 6–24)
BUN/CREAT SERPL: 17 (ref 9–23)
CALCIUM SERPL-MCNC: 10 MG/DL (ref 8.7–10.2)
CHLORIDE SERPL-SCNC: 97 MMOL/L (ref 96–106)
CHOLEST SERPL-MCNC: 169 MG/DL (ref 100–199)
CO2 SERPL-SCNC: 30 MMOL/L (ref 20–29)
CREAT SERPL-MCNC: 0.88 MG/DL (ref 0.57–1)
GLOBULIN SER CALC-MCNC: 3 G/DL (ref 1.5–4.5)
GLUCOSE SERPL-MCNC: 96 MG/DL (ref 65–99)
HDLC SERPL-MCNC: 75 MG/DL
INTERPRETATION, 910389: NORMAL
LDLC SERPL CALC-MCNC: 79 MG/DL (ref 0–99)
POTASSIUM SERPL-SCNC: 4 MMOL/L (ref 3.5–5.2)
PROT SERPL-MCNC: 8 G/DL (ref 6–8.5)
SODIUM SERPL-SCNC: 142 MMOL/L (ref 134–144)
TRIGL SERPL-MCNC: 74 MG/DL (ref 0–149)
VLDLC SERPL CALC-MCNC: 15 MG/DL (ref 5–40)

## 2019-05-30 ENCOUNTER — TELEPHONE (OUTPATIENT)
Dept: FAMILY MEDICINE CLINIC | Age: 59
End: 2019-05-30

## 2019-05-30 NOTE — TELEPHONE ENCOUNTER
Patient requesting several weeks ( about 2-3 weeks ) off from work due to grief, she is in counseling.

## 2019-05-31 NOTE — TELEPHONE ENCOUNTER
Advised per Dr. Constance Villanueva will either need an office visit or obtain letter from counselor, she stated she would try to get it from her counselor.

## 2019-07-01 RX ORDER — ATORVASTATIN CALCIUM 10 MG/1
TABLET, FILM COATED ORAL
Qty: 30 TAB | Refills: 3 | Status: SHIPPED | OUTPATIENT
Start: 2019-07-01 | End: 2019-10-20 | Stop reason: SDUPTHER

## 2019-07-10 DIAGNOSIS — K21.9 GASTROESOPHAGEAL REFLUX DISEASE WITHOUT ESOPHAGITIS: ICD-10-CM

## 2019-07-10 RX ORDER — OMEPRAZOLE 40 MG/1
CAPSULE, DELAYED RELEASE ORAL
Qty: 90 CAP | Refills: 3 | Status: SHIPPED | OUTPATIENT
Start: 2019-07-10 | End: 2020-08-31

## 2019-07-26 DIAGNOSIS — F32.A MILD DEPRESSION: ICD-10-CM

## 2019-07-26 RX ORDER — ESCITALOPRAM OXALATE 10 MG/1
20 TABLET ORAL DAILY
Qty: 180 TAB | Refills: 2 | Status: SHIPPED | OUTPATIENT
Start: 2019-07-26 | End: 2020-02-19 | Stop reason: DRUGHIGH

## 2019-08-22 NOTE — PROGRESS NOTES
HISTORY OF PRESENT ILLNESS  Keagan Silva is a 63 y.o. female. HPI   For follow up on BP. Suffering the grief of lost of her 22year old dtg being murdered by her boyfriend in a murder-suicide this past January 1. Has been very difficult day to day. \"My heart is broken. \"  She is in counseling and will start with attending a support group on tomorrow. Her therapist has recommend increased in her lexapro. She is using tylenol PM to help with sleep. HTN follow up:  Compliant w/ meds, low salt diet, and exercise. No home bp monitoring. No swelling, headache or dizziness. No chest pain, SOB, palpitations. Otherwise feeling well since the last visit. Glucose intolerance reveiw:  She has IGT. Diabetic ROS - further diabetic ROS: no polyuria or polydipsia, no chest pain, dyspnea or TIA's, no numbness, tingling or pain in extremities, no unusual visual symptoms. Lab review: orders written for new lab studies as appropriate; see orders  Depression Review:  Patient is seen for followup of depression. Treatment includes Lexapro. Still has insomnia. She has depressed mood and sadness. She sometime losses her focus even with driving. She forgets where she is going from thinking about the circumstances. Family is supportive. She experiences the following side effects from the treatment: none. Patient Active Problem List   Diagnosis Code    Anemia NEC     Insomnia G47.00    Encounter for medication monitoring Z51.81    Environmental allergies Z91.09    IGT (impaired glucose tolerance) R73.02    Essential hypertension I10    Mild depression (HCC) F32.0       Current Outpatient Medications   Medication Sig Dispense Refill    escitalopram oxalate (LEXAPRO) 10 mg tablet Take 2 Tabs by mouth daily.  180 Tab 2    omeprazole (PRILOSEC) 40 mg capsule TAKE 1 CAPSULE BY MOUTH EVERY DAY 90 Cap 3    atorvastatin (LIPITOR) 10 mg tablet TAKE 1 TABLET BY MOUTH EVERY DAY 30 Tab 3    amLODIPine (NORVASC) 5 mg tablet TAKE 1 TABLET BY MOUTH EVERY MORNING 90 Tab 3    olmesartan-hydroCHLOROthiazide (BENICAR HCT) 40-25 mg per tablet TAKE 1 TABLET BY MOUTH ONCE DAILY 90 Tab 3    ibuprofen (MOTRIN) 800 mg tablet take 1 tablet by mouth every 8 hours if needed for pain 90 Tab 1    aspirin 81 mg tablet Take 81 mg by mouth daily. No Known Allergies    Past Medical History:   Diagnosis Date    Anemia NEC     Glucose intolerance     HTN (hypertension) 3/26/2010    Insomnia        Past Surgical History:   Procedure Laterality Date    ENDOSCOPY, COLON, DIAGNOSTIC      HX GYN  2006    fibroids removed from uterus    HX TUBAL LIGATION  1993       Family History   Problem Relation Age of Onset    Stroke Mother     Hypertension Sister     Stroke Brother     Hypertension Sister     No Known Problems Sister     No Known Problems Sister     No Known Problems Sister     Stroke Brother        Social History     Tobacco Use    Smoking status: Never Smoker    Smokeless tobacco: Never Used   Substance Use Topics    Alcohol use:  Yes     Alcohol/week: 5.0 standard drinks     Types: 5 Glasses of wine per week        Lab Results   Component Value Date/Time    WBC 4.3 02/19/2018 12:06 PM    HGB 11.9 02/19/2018 12:06 PM    HCT 35.4 02/19/2018 12:06 PM    PLATELET 002 84/26/2448 12:06 PM    MCV 92 02/19/2018 12:06 PM     Lab Results   Component Value Date/Time    Cholesterol, total 169 04/30/2019 04:15 PM    HDL Cholesterol 75 04/30/2019 04:15 PM    LDL, calculated 79 04/30/2019 04:15 PM    Triglyceride 74 04/30/2019 04:15 PM    CHOL/HDL Ratio 2.6 05/04/2010 12:30 PM     Lab Results   Component Value Date/Time    TSH 1.820 04/28/2015 04:05 PM    T4, Free 1.15 04/28/2015 04:05 PM      Lab Results   Component Value Date/Time    Sodium 142 04/30/2019 04:15 PM    Potassium 4.0 04/30/2019 04:15 PM    Chloride 97 04/30/2019 04:15 PM    CO2 30 (H) 04/30/2019 04:15 PM    Anion gap 10 05/04/2010 12:30 PM    Glucose 96 04/30/2019 04:15 PM    BUN 15 04/30/2019 04:15 PM    Creatinine 0.88 04/30/2019 04:15 PM    BUN/Creatinine ratio 17 04/30/2019 04:15 PM    GFR est AA 84 04/30/2019 04:15 PM    GFR est non-AA 73 04/30/2019 04:15 PM    Calcium 10.0 04/30/2019 04:15 PM    Bilirubin, total 0.3 04/30/2019 04:15 PM    ALT (SGPT) 16 04/30/2019 04:15 PM    AST (SGOT) 16 04/30/2019 04:15 PM    Alk. phosphatase 71 04/30/2019 04:15 PM    Protein, total 8.0 04/30/2019 04:15 PM    Albumin 5.0 04/30/2019 04:15 PM    Globulin 4.0 01/04/2010 12:44 PM    A-G Ratio 1.7 04/30/2019 04:15 PM      Lab Results   Component Value Date/Time    Hemoglobin A1c 5.7 (H) 07/22/2013 02:43 PM    Hemoglobin A1c (POC) 6.0 04/30/2019 04:23 PM         Review of Systems   Constitutional: Negative for malaise/fatigue. HENT: Negative for congestion. Eyes: Negative for blurred vision. Respiratory: Negative for cough and shortness of breath. Cardiovascular: Negative for chest pain, palpitations and leg swelling. Gastrointestinal: Negative for abdominal pain, constipation and heartburn. Genitourinary: Negative for dysuria, frequency and urgency. Musculoskeletal: Negative for back pain and joint pain. Neurological: Negative for dizziness, tingling and headaches. Endo/Heme/Allergies: Negative for environmental allergies. Psychiatric/Behavioral: Negative for depression. The patient does not have insomnia. Physical Exam   Constitutional: She appears well-developed and well-nourished. HENT:   Right Ear: Tympanic membrane and ear canal normal.   Left Ear: Tympanic membrane and ear canal normal.   Nose: No mucosal edema or rhinorrhea. Mouth/Throat: Oropharynx is clear and moist and mucous membranes are normal.   Neck: Normal range of motion. Neck supple. No thyromegaly present. Cardiovascular: Normal rate, regular rhythm and normal heart sounds. Pulmonary/Chest: Effort normal and breath sounds normal.   Abdominal: Soft.  Normal appearance and bowel sounds are normal. She exhibits no mass. There is no tenderness. Musculoskeletal: Normal range of motion. She exhibits no edema. Lymphadenopathy:     She has no cervical adenopathy. Skin: Skin is warm and dry. Psychiatric: She has a normal mood and affect. Nursing note and vitals reviewed.       ASSESSMENT and PLAN  {ASSESSMENT/PLAN:[id number]}

## 2019-08-23 ENCOUNTER — OFFICE VISIT (OUTPATIENT)
Dept: FAMILY MEDICINE CLINIC | Age: 59
End: 2019-08-23

## 2019-08-23 DIAGNOSIS — Z91.09 ENVIRONMENTAL ALLERGIES: ICD-10-CM

## 2019-08-23 DIAGNOSIS — F32.A MILD DEPRESSION: ICD-10-CM

## 2019-08-23 DIAGNOSIS — R73.02 IGT (IMPAIRED GLUCOSE TOLERANCE): ICD-10-CM

## 2019-08-23 DIAGNOSIS — K21.9 GASTROESOPHAGEAL REFLUX DISEASE WITHOUT ESOPHAGITIS: ICD-10-CM

## 2019-08-23 DIAGNOSIS — I10 ESSENTIAL HYPERTENSION: Primary | ICD-10-CM

## 2019-08-23 DIAGNOSIS — Z51.81 ENCOUNTER FOR MEDICATION MONITORING: ICD-10-CM

## 2019-08-23 DIAGNOSIS — E78.2 MIXED HYPERLIPIDEMIA: ICD-10-CM

## 2019-08-23 NOTE — PROGRESS NOTES
Chief Complaint   Patient presents with    Hypertension     follow up         Mammogram 2/14/2019    Colonoscopy 6/19/2013 by Dr. Ayla Epperson repeat in 10 years      1. Have you been to the ER, urgent care clinic since your last visit? Hospitalized since your last visit? 2. Have you seen or consulted any other health care providers outside of the [address] [place] [address] since your last visit? Include any pap smears or colon screening.

## 2019-09-11 ENCOUNTER — OFFICE VISIT (OUTPATIENT)
Dept: FAMILY MEDICINE CLINIC | Age: 59
End: 2019-09-11

## 2019-09-11 NOTE — PROGRESS NOTES
HISTORY OF PRESENT ILLNESS  Rajani Noland is a 63 y.o. female. For follow up on BP. Suffering the grief of lost of her 22year old dtg being murdered by her boyfriend in a murder-suicide this past January 1. Has been very difficult day to day. \"My heart is broken. \"  She is in counseling and will start with attending a support group on tomorrow. Her therapist has recommend increased in her lexapro. She is using tylenol PM to help with sleep. HTN follow up:  Compliant w/ meds, low salt diet, and exercise. No home bp monitoring. No swelling, headache or dizziness. No chest pain, SOB, palpitations. Otherwise feeling well since the last visit. Glucose intolerance reveiw:  She has IGT. Diabetic ROS - further diabetic ROS: no polyuria or polydipsia, no chest pain, dyspnea or TIA's, no numbness, tingling or pain in extremities, no unusual visual symptoms. Lab review: orders written for new lab studies as appropriate; see orders  Depression Review:  Patient is seen for followup of depression. Treatment includes Lexapro. Still has insomnia. She has depressed mood and sadness. She sometime losses her focus even with driving. She forgets where she is going from thinking about the circumstances. Family is supportive. She experiences the following side effects from the treatment: none. Patient Active Problem List   Diagnosis Code    Anemia NEC     Insomnia G47.00    Encounter for medication monitoring Z51.81    Environmental allergies Z91.09    IGT (impaired glucose tolerance) R73.02    Essential hypertension I10    Mild depression (HCC) F32.0       Current Outpatient Medications   Medication Sig Dispense Refill    escitalopram oxalate (LEXAPRO) 10 mg tablet Take 2 Tabs by mouth daily.  180 Tab 2    omeprazole (PRILOSEC) 40 mg capsule TAKE 1 CAPSULE BY MOUTH EVERY DAY 90 Cap 3    atorvastatin (LIPITOR) 10 mg tablet TAKE 1 TABLET BY MOUTH EVERY DAY 30 Tab 3    amLODIPine (NORVASC) 5 mg tablet TAKE 1 TABLET BY MOUTH EVERY MORNING 90 Tab 3    olmesartan-hydroCHLOROthiazide (BENICAR HCT) 40-25 mg per tablet TAKE 1 TABLET BY MOUTH ONCE DAILY 90 Tab 3    ibuprofen (MOTRIN) 800 mg tablet take 1 tablet by mouth every 8 hours if needed for pain 90 Tab 1    aspirin 81 mg tablet Take 81 mg by mouth daily. No Known Allergies   1  Past Medical History:   Diagnosis Date    Anemia NEC     Glucose intolerance     HTN (hypertension) 3/26/2010    Insomnia    993      Problem Relation Age of Onset    Stroke Mother     Hypertension Sister     Stroke Brother     Hypertension Sister     No Known Problems Sister     No Known Problems Sister     No Known Problems Sister     Stroke Brother        Social History     Tobacco Use    Smoking status: Never Smoker    Smokeless tobacco: Never Used   Substance Use Topics    Alcohol use:  Yes     Alcohol/week: 5.0 standard drinks     Types: 5 Glasses of wine per week        Lab Results   Component Value Date/Time    WBC 4.3 02/19/2018 12:06 PM    HGB 11.9 02/19/2018 12:06 PM    HCT 35.4 02/19/2018 12:06 PM    PLATELET 638 92/68/3659 12:06 PM    MCV 92 02/19/2018 12:06 PM     Lab Results   Component Value Date/Time    Cholesterol, total 169 04/30/2019 04:15 PM    HDL Cholesterol 75 04/30/2019 04:15 PM    LDL, calculated 79 04/30/2019 04:15 PM    Triglyceride 74 04/30/2019 04:15 PM    CHOL/HDL Ratio 2.6 05/04/2010 12:30 PM     Lab Results   Component Value Date/Time    TSH 1.820 04/28/2015 04:05 PM    T4, Free 1.15 04/28/2015 04:05 PM      Lab Results   Component Value Date/Time    Sodium 142 04/30/2019 04:15 PM    Potassium 4.0 04/30/2019 04:15 PM    Chloride 97 04/30/2019 04:15 PM    CO2 30 (H) 04/30/2019 04:15 PM    Anion gap 10 05/04/2010 12:30 PM    Glucose 96 04/30/2019 04:15 PM    BUN 15 04/30/2019 04:15 PM    Creatinine 0.88 04/30/2019 04:15 PM    BUN/Creatinine ratio 17 04/30/2019 04:15 PM    GFR est AA 84 04/30/2019 04:15 PM    GFR est non-AA 73 04/30/2019 04:15 PM    Calcium 10.0 04/30/2019 04:15 PM    Bilirubin, total 0.3 04/30/2019 04:15 PM    ALT (SGPT) 16 04/30/2019 04:15 PM    AST (SGOT) 16 04/30/2019 04:15 PM    Alk. phosphatase 71 04/30/2019 04:15 PM    Protein, total 8.0 04/30/2019 04:15 PM    Albumin 5.0 04/30/2019 04:15 PM    Globulin 4.0 01/04/2010 12:44 PM    A-G Ratio 1.7 04/30/2019 04:15 PM      Lab Results   Component Value Date/Time    Hemoglobin A1c 5.7 (H) 07/22/2013 02:43 PM    Hemoglobin A1c (POC) 6.0 04/30/2019 04:23 PM         Review of Systems   Constitutional: Negative for malaise/fatigue. HENT: Negative for congestion. Eyes: Negative for blurred vision. Itchy eyes from her allergies. Respiratory: Negative for cough and shortness of breath. Cardiovascular: Negative for chest pain, palpitations and leg swelling. Gastrointestinal: Negative for abdominal pain, constipation and heartburn. Genitourinary: Negative for dysuria, frequency and urgency. Musculoskeletal: Negative for back pain and joint pain. Neurological: Negative for dizziness, tingling and headaches. Endo/Heme/Allergies: Positive for environmental allergies. Psychiatric/Behavioral: Negative for depression. The patient does not have insomnia. Physical Exam   Constitutional: She appears well-developed and well-nourished. HENT:   Right Ear: Tympanic membrane and ear canal normal.   Left Ear: Tympanic membrane and ear canal normal.   Nose: No mucosal edema or rhinorrhea. Mouth/Throat: Oropharynx is clear and moist and mucous membranes are normal.   Neck: Normal range of motion. Neck supple. No thyromegaly present. Cardiovascular: Normal rate, regular rhythm and normal heart sounds. Pulmonary/Chest: Effort normal and breath sounds normal.   Abdominal: Soft. Normal appearance and bowel sounds are normal. She exhibits no mass. There is no tenderness. Musculoskeletal: Normal range of motion. She exhibits no edema. Lymphadenopathy:     She has no cervical adenopathy. Skin: Skin is warm and dry. Psychiatric: She has a normal mood and affect. Nursing note and vitals reviewed.     ASSESSMENT and PLAN  {ASSESSMENT/PLAN:47452}

## 2019-09-25 ENCOUNTER — OFFICE VISIT (OUTPATIENT)
Dept: FAMILY MEDICINE CLINIC | Age: 59
End: 2019-09-25

## 2019-09-25 NOTE — PROGRESS NOTES
HISTORY OF PRESENT ILLNESS Christiano Funes is a 63 y.o. female. For follow up on BP. Suffering the grief of lost of her 22year old dtg being murdered by her boyfriend in a murder-suicide this past January 1. Has been very difficult day to day. \"My heart is broken. \"  She is in counseling and will start with attending a support group on tomorrow. Her therapist has recommend increased in her lexapro. She is using tylenol PM to help with sleep. HTN follow up: 
Compliant w/ meds, low salt diet, and exercise. No home bp monitoring. No swelling, headache or dizziness. No chest pain, SOB, palpitations. Otherwise feeling well since the last visit. Glucose intolerance reveiw: She has IGT. Diabetic ROS - further diabetic ROS: no polyuria or polydipsia, no chest pain, dyspnea or TIA's, no numbness, tingling or pain in extremities, no unusual visual symptoms. Lab review: orders written for new lab studies as appropriate; see orders Depression Review: 
Patient is seen for followup of depression. Treatment includes Lexapro. Still has insomnia. She has depressed mood and sadness. She sometime losses her focus even with driving. She forgets where she is going from thinking about the circumstances. Family is supportive. She experiences the following side effects from the treatment: none. Patient Active Problem List  
Diagnosis Code  Anemia NEC   
 Insomnia G47.00  
 Encounter for medication monitoring Z51.81  
 Environmental allergies Z91.09  
 IGT (impaired glucose tolerance) R73.02  
 Essential hypertension I10  
 Mild depression (HCC) F32.0 Current Outpatient Medications Medication Sig Dispense Refill  escitalopram oxalate (LEXAPRO) 10 mg tablet Take 2 Tabs by mouth daily.  180 Tab 2  
 omeprazole (PRILOSEC) 40 mg capsule TAKE 1 CAPSULE BY MOUTH EVERY DAY 90 Cap 3  
 atorvastatin (LIPITOR) 10 mg tablet TAKE 1 TABLET BY MOUTH EVERY DAY 30 Tab 3  
  amLODIPine (NORVASC) 5 mg tablet TAKE 1 TABLET BY MOUTH EVERY MORNING 90 Tab 3  
 olmesartan-hydroCHLOROthiazide (BENICAR HCT) 40-25 mg per tablet TAKE 1 TABLET BY MOUTH ONCE DAILY 90 Tab 3  ibuprofen (MOTRIN) 800 mg tablet take 1 tablet by mouth every 8 hours if needed for pain 90 Tab 1  
 aspirin 81 mg tablet Take 81 mg by mouth daily. No Known Allergies   1 Past Medical History:  
Diagnosis Date  Anemia NEC   
 Glucose intolerance  HTN (hypertension) 3/26/2010  Insomnia [address] Problem Relation Age of Onset  Stroke Mother  Hypertension Sister  Stroke Brother  Hypertension Sister  No Known Problems Sister  No Known Problems Sister  No Known Problems Sister  Stroke Brother Social History Tobacco Use  Smoking status: Never Smoker  Smokeless tobacco: Never Used Substance Use Topics  Alcohol use: Yes Alcohol/week: 5.0 standard drinks Types: 5 Glasses of wine per week Lab Results Component Value Date/Time WBC 4.3 02/19/2018 12:06 PM  
 HGB 11.9 02/19/2018 12:06 PM  
 HCT 35.4 02/19/2018 12:06 PM  
 PLATELET 757 00/29/1386 12:06 PM  
 MCV 92 02/19/2018 12:06 PM  
 
Lab Results Component Value Date/Time Cholesterol, total 169 04/30/2019 04:15 PM  
 HDL Cholesterol 75 04/30/2019 04:15 PM  
 LDL, calculated 79 04/30/2019 04:15 PM  
 Triglyceride 74 04/30/2019 04:15 PM  
 CHOL/HDL Ratio 2.6 05/04/2010 12:30 PM  
 
Lab Results Component Value Date/Time TSH 1.820 04/28/2015 04:05 PM  
 T4, Free 1.15 04/28/2015 04:05 PM  
  
Lab Results Component Value Date/Time  Sodium 142 04/30/2019 04:15 PM  
 Potassium 4.0 04/30/2019 04:15 PM  
 Chloride 97 04/30/2019 04:15 PM  
 CO2 30 (H) 04/30/2019 04:15 PM  
 Anion gap 10 05/04/2010 12:30 PM  
 Glucose 96 04/30/2019 04:15 PM  
 BUN 15 04/30/2019 04:15 PM  
 Creatinine 0.88 04/30/2019 04:15 PM  
 BUN/Creatinine ratio 17 04/30/2019 04:15 PM  
 GFR est AA 84 04/30/2019 04:15 PM  
 GFR est non-AA 73 04/30/2019 04:15 PM  
 Calcium 10.0 04/30/2019 04:15 PM  
 Bilirubin, total 0.3 04/30/2019 04:15 PM  
 ALT (SGPT) 16 04/30/2019 04:15 PM  
 AST (SGOT) 16 04/30/2019 04:15 PM  
 Alk. phosphatase 71 04/30/2019 04:15 PM  
 Protein, total 8.0 04/30/2019 04:15 PM  
 Albumin 5.0 04/30/2019 04:15 PM  
 Globulin 4.0 01/04/2010 12:44 PM  
 A-G Ratio 1.7 04/30/2019 04:15 PM  
  
Lab Results Component Value Date/Time Hemoglobin A1c 5.7 (H) 07/22/2013 02:43 PM  
 Hemoglobin A1c (POC) 6.0 04/30/2019 04:23 PM  
   
 
Review of Systems Constitutional: Negative for malaise/fatigue. HENT: Negative for congestion. Eyes: Negative for blurred vision. Itchy eyes from her allergies. Respiratory: Negative for cough and shortness of breath. Cardiovascular: Negative for chest pain, palpitations and leg swelling. Gastrointestinal: Negative for abdominal pain, constipation and heartburn. Genitourinary: Negative for dysuria, frequency and urgency. Musculoskeletal: Negative for back pain and joint pain. Neurological: Negative for dizziness, tingling and headaches. Endo/Heme/Allergies: Positive for environmental allergies. Psychiatric/Behavioral: Negative for depression. The patient does not have insomnia. Physical Exam  
Constitutional: She appears well-developed and well-nourished. HENT:  
Right Ear: Tympanic membrane and ear canal normal.  
Left Ear: Tympanic membrane and ear canal normal.  
Nose: No mucosal edema or rhinorrhea. Mouth/Throat: Oropharynx is clear and moist and mucous membranes are normal.  
Neck: Normal range of motion. Neck supple. No thyromegaly present. Cardiovascular: Normal rate, regular rhythm and normal heart sounds. Pulmonary/Chest: Effort normal and breath sounds normal.  
Abdominal: Soft. Normal appearance and bowel sounds are normal. She exhibits no mass. There is no tenderness. Musculoskeletal: Normal range of motion. She exhibits no edema. Lymphadenopathy:  
  She has no cervical adenopathy. Skin: Skin is warm and dry. Psychiatric: She has a normal mood and affect. Nursing note and vitals reviewed. ASSESSMENT and PLAN 
{ASSESSMENT/PLAN:31852} A user error has taken place: {error :5988}.

## 2019-10-21 RX ORDER — ATORVASTATIN CALCIUM 10 MG/1
TABLET, FILM COATED ORAL
Qty: 90 TAB | Refills: 1 | Status: SHIPPED | OUTPATIENT
Start: 2019-10-21 | End: 2020-07-28 | Stop reason: SDUPTHER

## 2020-01-03 RX ORDER — IBUPROFEN 800 MG/1
TABLET ORAL
Qty: 90 TAB | Refills: 1 | Status: SHIPPED | OUTPATIENT
Start: 2020-01-03 | End: 2021-03-11 | Stop reason: SDUPTHER

## 2020-01-27 ENCOUNTER — OFFICE VISIT (OUTPATIENT)
Dept: FAMILY MEDICINE CLINIC | Age: 60
End: 2020-01-27

## 2020-01-27 DIAGNOSIS — F32.A MILD DEPRESSION: ICD-10-CM

## 2020-01-27 DIAGNOSIS — E78.2 MIXED HYPERLIPIDEMIA: ICD-10-CM

## 2020-01-27 DIAGNOSIS — I10 ESSENTIAL HYPERTENSION: Primary | ICD-10-CM

## 2020-01-27 DIAGNOSIS — R73.02 IGT (IMPAIRED GLUCOSE TOLERANCE): ICD-10-CM

## 2020-01-27 DIAGNOSIS — Z91.09 ENVIRONMENTAL ALLERGIES: ICD-10-CM

## 2020-01-27 DIAGNOSIS — K21.9 GASTROESOPHAGEAL REFLUX DISEASE WITHOUT ESOPHAGITIS: ICD-10-CM

## 2020-01-27 DIAGNOSIS — Z51.81 ENCOUNTER FOR MEDICATION MONITORING: ICD-10-CM

## 2020-01-27 NOTE — PROGRESS NOTES
Subjective:  
63 y.o. female for Well Woman Check. Patient's last menstrual period was 03/01/2013. HTN follow up: 
Compliant w/ meds, low salt diet, and exercise. No home bp monitoring. No swelling, headache or dizziness. No chest pain, SOB, palpitations. Otherwise feeling well since the last visit. Glucose intolerance reveiw: She has IGT. Diabetic ROS - further diabetic ROS: no polyuria or polydipsia, no chest pain, dyspnea or TIA's, no numbness, tingling or pain in extremities, no unusual visual symptoms. Lab review: orders written for new lab studies as appropriate; see orders Depression Review: 
Patient is seen for followup of depression. Treatment includes Lexapro. Still has insomnia. She has depressed mood and sadness. She sometime losses her focus even with driving. She forgets where she is going from thinking about the circumstances. Family is supportive. She experiences the following side effects from the treatment: none. Social History: {Sexual Hx:5163}. Pertinent past medical hstory: {contraception pert hx:77520::\"no history of HTN, DVT, CAD, DM, liver disease, migraines or smoking\"}. Patient Active Problem List  
Diagnosis Code  Anemia NEC   
 Insomnia G47.00  
 Encounter for medication monitoring Z51.81  
 Environmental allergies Z91.09  
 IGT (impaired glucose tolerance) R73.02  
 Essential hypertension I10  
 Mild depression (HCC) F32.0 Current Outpatient Medications Medication Sig Dispense Refill  ibuprofen (MOTRIN) 800 mg tablet TAKE 1 TABLET BY MOUTH EVERY 8 HOURS IF NEEDED FOR PAIN 90 Tab 1  
 atorvastatin (LIPITOR) 10 mg tablet TAKE 1 TABLET BY MOUTH EVERY DAY 90 Tab 1  
 escitalopram oxalate (LEXAPRO) 10 mg tablet Take 2 Tabs by mouth daily.  180 Tab 2  
 omeprazole (PRILOSEC) 40 mg capsule TAKE 1 CAPSULE BY MOUTH EVERY DAY 90 Cap 3  
 amLODIPine (NORVASC) 5 mg tablet TAKE 1 TABLET BY MOUTH EVERY MORNING 90 Tab 3  
  olmesartan-hydroCHLOROthiazide (BENICAR HCT) 40-25 mg per tablet TAKE 1 TABLET BY MOUTH ONCE DAILY 90 Tab 3  
 aspirin 81 mg tablet Take 81 mg by mouth daily. No Known Allergies Past Medical History:  
Diagnosis Date  Anemia NEC   
 Glucose intolerance  HTN (hypertension) 3/26/2010  Insomnia Past Surgical History:  
Procedure Laterality Date  ENDOSCOPY, COLON, DIAGNOSTIC    
 HX GYN  2006  
 fibroids removed from uterus [place] Family History Problem Relation Age of Onset  Stroke Mother  Hypertension Sister  Stroke Brother  Hypertension Sister  No Known Problems Sister  No Known Problems Sister  No Known Problems Sister  Stroke Brother Social History Tobacco Use  Smoking status: Never Smoker  Smokeless tobacco: Never Used Substance Use Topics  Alcohol use: Yes Alcohol/week: 5.0 standard drinks Types: 5 Glasses of wine per week Lab Results Component Value Date/Time WBC 4.3 02/19/2018 12:06 PM  
 HGB 11.9 02/19/2018 12:06 PM  
 HCT 35.4 02/19/2018 12:06 PM  
 PLATELET 946 77/36/8769 12:06 PM  
 MCV 92 02/19/2018 12:06 PM  
 
Lab Results Component Value Date/Time Cholesterol, total 169 04/30/2019 04:15 PM  
 HDL Cholesterol 75 04/30/2019 04:15 PM  
 LDL, calculated 79 04/30/2019 04:15 PM  
 Triglyceride 74 04/30/2019 04:15 PM  
 CHOL/HDL Ratio 2.6 05/04/2010 12:30 PM  
 
Lab Results Component Value Date/Time TSH 1.820 04/28/2015 04:05 PM  
 T4, Free 1.15 04/28/2015 04:05 PM  
  
Lab Results Component Value Date/Time  Sodium 142 04/30/2019 04:15 PM  
 Potassium 4.0 04/30/2019 04:15 PM  
 Chloride 97 04/30/2019 04:15 PM  
 CO2 30 (H) 04/30/2019 04:15 PM  
 Anion gap 10 05/04/2010 12:30 PM  
 Glucose 96 04/30/2019 04:15 PM  
 BUN 15 04/30/2019 04:15 PM  
 Creatinine 0.88 04/30/2019 04:15 PM  
 BUN/Creatinine ratio 17 04/30/2019 04:15 PM  
 GFR est AA 84 04/30/2019 04:15 PM  
 GFR est non-AA 73 04/30/2019 04:15 PM  
 Calcium 10.0 04/30/2019 04:15 PM  
 Bilirubin, total 0.3 04/30/2019 04:15 PM  
 ALT (SGPT) 16 04/30/2019 04:15 PM  
 AST (SGOT) 16 04/30/2019 04:15 PM  
 Alk. phosphatase 71 04/30/2019 04:15 PM  
 Protein, total 8.0 04/30/2019 04:15 PM  
 Albumin 5.0 04/30/2019 04:15 PM  
 Globulin 4.0 01/04/2010 12:44 PM  
 A-G Ratio 1.7 04/30/2019 04:15 PM  
  
Lab Results Component Value Date/Time Hemoglobin A1c 5.7 (H) 07/22/2013 02:43 PM  
 Hemoglobin A1c (POC) 6.0 04/30/2019 04:23 PM  
   
 
 
ROS:  Feeling well. No dyspnea or chest pain on exertion. No abdominal pain, change in bowel habits, black or bloody stools. No urinary tract symptoms. GYN ROS: no breast pain or new or enlarging lumps on self exam, no vaginal bleeding, no discharge or pelvic pain, no hot flashes. No neurological complaints. Objective:  
 
Visit Vitals LMP 03/01/2013 The patient appears well, alert, oriented x 3, in no distress. ENT normal.  Neck supple. No adenopathy or thyromegaly. CARMELITA. Lungs are clear, good air entry, no wheezes, rhonchi or rales. S1 and S2 normal, no murmurs, regular rate and rhythm. Abdomen soft without tenderness, guarding, mass or organomegaly. Extremities show no edema, normal peripheral pulses. Neurological is normal, no focal findings. BREAST EXAM: breasts appear normal, no suspicious masses, no skin or nipple changes or axillary nodes PELVIC EXAM: {pelvic exam:011135::\"normal external genitalia, vulva, vagina, cervix, uterus and adnexa\"} Assessment/Plan:  
{gyn assessment:013484::\"well woman\"} 
{gyn plan:044762::\"mammogram\",\"pap smear\",\"return annually or prn\"} {Assessment and Plan:45666}.

## 2020-02-19 ENCOUNTER — TELEPHONE (OUTPATIENT)
Dept: FAMILY MEDICINE CLINIC | Age: 60
End: 2020-02-19

## 2020-02-19 ENCOUNTER — OFFICE VISIT (OUTPATIENT)
Dept: FAMILY MEDICINE CLINIC | Age: 60
End: 2020-02-19

## 2020-02-19 VITALS
HEIGHT: 62 IN | RESPIRATION RATE: 16 BRPM | OXYGEN SATURATION: 97 % | DIASTOLIC BLOOD PRESSURE: 77 MMHG | SYSTOLIC BLOOD PRESSURE: 136 MMHG | TEMPERATURE: 98.6 F | WEIGHT: 160.8 LBS | BODY MASS INDEX: 29.59 KG/M2 | HEART RATE: 97 BPM

## 2020-02-19 DIAGNOSIS — Z23 ENCOUNTER FOR IMMUNIZATION: ICD-10-CM

## 2020-02-19 DIAGNOSIS — E78.2 MIXED HYPERLIPIDEMIA: ICD-10-CM

## 2020-02-19 DIAGNOSIS — Z00.00 ROUTINE GENERAL MEDICAL EXAMINATION AT A HEALTH CARE FACILITY: Primary | ICD-10-CM

## 2020-02-19 DIAGNOSIS — I10 ESSENTIAL HYPERTENSION: ICD-10-CM

## 2020-02-19 DIAGNOSIS — F43.81 PROLONGED GRIEF REACTION: ICD-10-CM

## 2020-02-19 DIAGNOSIS — K21.9 GASTROESOPHAGEAL REFLUX DISEASE WITHOUT ESOPHAGITIS: ICD-10-CM

## 2020-02-19 DIAGNOSIS — Z51.81 ENCOUNTER FOR MEDICATION MONITORING: ICD-10-CM

## 2020-02-19 DIAGNOSIS — M70.61 TROCHANTERIC BURSITIS OF RIGHT HIP: ICD-10-CM

## 2020-02-19 DIAGNOSIS — R73.02 IGT (IMPAIRED GLUCOSE TOLERANCE): ICD-10-CM

## 2020-02-19 DIAGNOSIS — F43.21 SITUATIONAL DEPRESSION: ICD-10-CM

## 2020-02-19 LAB
BACTERIA UA POCT, BACTPOCT: NORMAL
BILIRUB UR QL STRIP: NEGATIVE
CASTS UA POCT: NORMAL
CLUE CELLS, CLUEPOCT: NORMAL
CRYSTALS UA POCT, CRYSPOCT: NORMAL
EPITHELIAL CELLS POCT: NORMAL
GLUCOSE POC: 124 MG/DL
GLUCOSE UR-MCNC: NEGATIVE MG/DL
HBA1C MFR BLD HPLC: 6.3 %
KETONES P FAST UR STRIP-MCNC: NEGATIVE MG/DL
MUCUS UA POCT, MUCPOCT: NORMAL
PH UR STRIP: 7 [PH] (ref 4.6–8)
PROT UR QL STRIP: NEGATIVE
RBC UA POCT, RBCPOCT: NORMAL
SP GR UR STRIP: 1.01 (ref 1–1.03)
TRICH UA POCT, TRICHPOC: NORMAL
UA UROBILINOGEN AMB POC: NORMAL (ref 0.2–1)
URINALYSIS CLARITY POC: NORMAL
URINALYSIS COLOR POC: YELLOW
URINE BLOOD POC: NEGATIVE
URINE CULT COMMENT, POCT: NORMAL
URINE LEUKOCYTES POC: NEGATIVE
URINE NITRITES POC: NEGATIVE
WBC UA POCT, WBCPOCT: NORMAL
YEAST UA POCT, YEASTPOC: NORMAL

## 2020-02-19 RX ORDER — ESCITALOPRAM OXALATE 10 MG/1
10 TABLET ORAL DAILY
COMMUNITY
End: 2020-05-21 | Stop reason: SDUPTHER

## 2020-02-19 RX ORDER — PREDNISONE 10 MG/1
TABLET ORAL
Qty: 21 TAB | Refills: 0 | Status: SHIPPED | OUTPATIENT
Start: 2020-02-19 | End: 2020-05-21 | Stop reason: ALTCHOICE

## 2020-02-19 NOTE — PROGRESS NOTES
Subjective:   63 y.o. female for Well Woman Check. Patient's last menstrual period was 03/01/2013. Still feeling depress and dealing with the grief of lost of her 22year old dtg being murdered by her boyfriend in a murder-suicide this past January 20219. Has been very difficult day to day still. \"My heart is broken. \"  She stopped counseling on last year b/c she got tired of talking about it but thinks that she would like to resume grief counseling. She did not increase the lexapro as recommended thinks that it would not help her \"bring back her dtg\". She is using tylenol PM to help with sleep. No SI or HI. C/o right  hip pain over the past several months. Pain comes and goes and has been more severe over the past several weeks. Hurys with lying on the right side. Pain is worsen by certain positions. No injury noted. She has been taking advil as needed which does help with the pain. HTN follow up:  Compliant w/ meds, low salt diet, and exercise. No home bp monitoring. No swelling, headache or dizziness. No chest pain, SOB, palpitations. Otherwise feeling well since the last visit. Glucose intolerance reveiw:  She has IGT. Diabetic ROS - further diabetic ROS: no polyuria or polydipsia, no chest pain, dyspnea or TIA's, no numbness, tingling or pain in extremities, no unusual visual symptoms. Lab review: orders written for new lab studies as appropriate; see orders  Depression Review:  Patient is seen for followup of depression. Treatment includes Lexapro. Still has insomnia. She has depressed mood and sadness. She sometime losses her focus during the day at work. Family is supportive. She experiences the following side effects from the treatment: none.   HM:  Mammogram  2/14/19  Colonoscopy  6/19/13  - 10 years [facility]  PAP  2/1/18  A1C  4/30/19    Patient Active Problem List   Diagnosis Code    Anemia NEC     Insomnia G47.00    Encounter for medication monitoring Z51.81    Environmental allergies Z91.09    IGT (impaired glucose tolerance) R73.02    Essential hypertension I10    Mild depression (HCC) F32.0       Current Outpatient Medications   Medication Sig Dispense Refill    escitalopram oxalate (LEXAPRO) 10 mg tablet Take 10 mg by mouth daily.  predniSONE (STERAPRED DS) 10 mg dose pack See administration instruction per 10mg dose pack 21 Tab 0    ibuprofen (MOTRIN) 800 mg tablet TAKE 1 TABLET BY MOUTH EVERY 8 HOURS IF NEEDED FOR PAIN 90 Tab 1    atorvastatin (LIPITOR) 10 mg tablet TAKE 1 TABLET BY MOUTH EVERY DAY 90 Tab 1    omeprazole (PRILOSEC) 40 mg capsule TAKE 1 CAPSULE BY MOUTH EVERY DAY 90 Cap 3    amLODIPine (NORVASC) 5 mg tablet TAKE 1 TABLET BY MOUTH EVERY MORNING 90 Tab 3    olmesartan-hydroCHLOROthiazide (BENICAR HCT) 40-25 mg per tablet TAKE 1 TABLET BY MOUTH ONCE DAILY 90 Tab 3    aspirin 81 mg tablet Take 81 mg by mouth daily. No Known Allergies    Past Medical History:   Diagnosis Date    Anemia NEC     Glucose intolerance     HTN (hypertension) 3/26/2010    Insomnia        Past Surgical History:   Procedure Laterality Date    ENDOSCOPY, COLON, DIAGNOSTIC      HX GYN  2006    fibroids removed from uterus    HX TUBAL LIGATION  1993       Family History   Problem Relation Age of Onset    Stroke Mother     Hypertension Sister     Stroke Brother     Hypertension Sister     No Known Problems Sister     No Known Problems Sister     No Known Problems Sister     Stroke Brother        Social History     Tobacco Use    Smoking status: Never Smoker    Smokeless tobacco: Never Used   Substance Use Topics    Alcohol use:  Yes     Alcohol/week: 5.0 standard drinks     Types: 5 Glasses of wine per week     Lab Results   Component Value Date/Time    WBC 4.3 02/19/2018 12:06 PM    HGB 11.9 02/19/2018 12:06 PM    HCT 35.4 02/19/2018 12:06 PM    PLATELET 268 80/74/2134 12:06 PM    MCV 92 02/19/2018 12:06 PM     Lab Results   Component Value Date/Time    Cholesterol, total 169 04/30/2019 04:15 PM    HDL Cholesterol 75 04/30/2019 04:15 PM    LDL, calculated 79 04/30/2019 04:15 PM    Triglyceride 74 04/30/2019 04:15 PM    CHOL/HDL Ratio 2.6 05/04/2010 12:30 PM     Lab Results   Component Value Date/Time    TSH 1.820 04/28/2015 04:05 PM    T4, Free 1.15 04/28/2015 04:05 PM      Lab Results   Component Value Date/Time    Sodium 142 04/30/2019 04:15 PM    Potassium 4.0 04/30/2019 04:15 PM    Chloride 97 04/30/2019 04:15 PM    CO2 30 (H) 04/30/2019 04:15 PM    Anion gap 10 05/04/2010 12:30 PM    Glucose 96 04/30/2019 04:15 PM    BUN 15 04/30/2019 04:15 PM    Creatinine 0.88 04/30/2019 04:15 PM    BUN/Creatinine ratio 17 04/30/2019 04:15 PM    GFR est AA 84 04/30/2019 04:15 PM    GFR est non-AA 73 04/30/2019 04:15 PM    Calcium 10.0 04/30/2019 04:15 PM    Bilirubin, total 0.3 04/30/2019 04:15 PM    ALT (SGPT) 16 04/30/2019 04:15 PM    AST (SGOT) 16 04/30/2019 04:15 PM    Alk. phosphatase 71 04/30/2019 04:15 PM    Protein, total 8.0 04/30/2019 04:15 PM    Albumin 5.0 04/30/2019 04:15 PM    Globulin 4.0 01/04/2010 12:44 PM    A-G Ratio 1.7 04/30/2019 04:15 PM      Lab Results   Component Value Date/Time    Hemoglobin A1c 5.7 (H) 07/22/2013 02:43 PM    Hemoglobin A1c (POC) 6.3 02/19/2020 11:45 AM       ROS:  Feeling well. No dyspnea or chest pain on exertion. No abdominal pain, change in bowel habits, black or bloody stools. No urinary tract symptoms. GYN ROS: no breast pain or new or enlarging lumps on self exam, no discharge or pelvic pain, no hot flashes. No neurological complaints. Objective:   Visit Vitals  /77 (BP 1 Location: Left arm, BP Patient Position: Sitting)   Pulse 97   Temp 98.6 °F (37 °C) (Oral)   Resp 16   Ht 5' 2\" (1.575 m)   Wt 160 lb 12.8 oz (72.9 kg)   LMP 03/01/2013   SpO2 97%   BMI 29.41 kg/m²     The patient appears well, alert, oriented x 3, in no distress. ENT normal.  Neck supple. No adenopathy or thyromegaly. CARMELITA. Lungs are clear, good air entry, no wheezes, rhonchi or rales. S1 and S2 normal, no murmurs, regular rate and rhythm. Abdomen soft without tenderness, guarding, mass or organomegaly. Extremities show no edema, normal peripheral pulses. Neurological is normal, no focal findings. BREAST EXAM: breasts appear normal, no suspicious masses, no skin or nipple changes or axillary nodes    PELVIC EXAM: examination not indicated(done by GYN)    Assessment/Plan:   well woman  mammogram  pap smear  counseled on breast self exam, mammography screening, menopause, osteoporosis and adequate intake of calcium and vitamin D  additional lab tests per orders  Diagnoses and all orders for this visit:    1. Routine general medical examination at a health care facility  -     AMB POC URINALYSIS DIP STICK AUTO W/ MICRO   -     AMB POC FECAL BLOOD, OCCULT, QL 1 CARD    2. Encounter for immunization  -     INFLUENZA VIRUS VAC QUAD,SPLIT,PRESV FREE SYRINGE IM  -     AL IMMUNIZ ADMIN,1 SINGLE/COMB VAC/TOXOID    3. Essential hypertension  Discussed sodium restriction, high k rich diet, maintaining ideal body weight and regular exercise program such as daily walking 30 min perday 4-5 times per week, as physiologic means to achieve blood pressure control.  Medication compliance advised. 4. Mixed hyperlipidemia  -     LIPID PANEL    5. IGT (impaired glucose tolerance)  -     AMB POC HEMOGLOBIN A1C  -     AMB POC GLUCOSE, QUANTITATIVE, BLOOD    6. Prolonged grief reaction  7. Situational depression  For now will continue with lexapro 10 mg daily.    -     REFERRAL TO PSYCHOLOGY    8. Gastroesophageal reflux disease without esophagitis  Stable     9. Trochanteric bursitis of right hip  -     predniSONE (STERAPRED DS) 10 mg dose pack; See administration instruction per 10mg dose pack  Instructions for exercises given and reviewed with pt. Pt also to use heat to the area 3-4 times a day over the next several days until sx are improved. 10. Encounter for medication monitoring  -     CBC W/O DIFF  -     METABOLIC PANEL, COMPREHENSIVE      Follow-up and Dispositions    · Return in about 4 months (around 6/19/2020). reviewed diet, exercise and weight control  cardiovascular risk and specific lipid/LDL goals reviewed  reviewed medications and side effects in detail  specific diabetic recommendations: low cholesterol diet, weight control and daily exercise discussed and glycohemoglobin and other lab monitoring discussed     I have discussed diagnosis listed in this note with pt and/or family. I have discussed treatment plans and options and the risk/benefit analysis of those options, including safe use of medications and possible medication side effects. Through the use of shared decision making we have agreed to the above plan. The patient has received an after-visit summary and questions were answered concerning future plans and follow up. Advise pt of any urgent changes then to proceed to the ER. Joanna Arriaga

## 2020-02-19 NOTE — PROGRESS NOTES
Chief Complaint   Patient presents with    Complete Physical     1. Have you been to the ER, urgent care clinic since your last visit? Hospitalized since your last visit? NO    2. Have you seen or consulted any other health care providers outside of the [address] [place] [address] since your last visit? Include any pap smears or colon screening. NO    Mammogram  2/14/19    Colonoscopy  6/19/13  - 10 years [facility]    PAP  2/1/18    A1C  4/30/19    Eye Exam  Has not done, but will schedule    Concerns today:  pain in right hip, tylenol usually relieves it but comes back. Order placed for flu shot, per Verbal Order from Dr. Severino on 2/19/2020 due to need    Flu shot 0.5 ml given in right arm IM, no reaction noted.

## 2020-02-19 NOTE — TELEPHONE ENCOUNTER
Left message for Henri fishman [phone]. Appointment needed for situational depression and prolonged grief reaction. Waiting on call back.

## 2020-02-19 NOTE — PATIENT INSTRUCTIONS
Hip Bursitis: Care Instructions Your Care Instructions Bursitis is inflammation of the bursa. A bursa is a small sac of fluid that cushions a joint and helps it move easily. A bursa sits between a bone in the hip and the muscles and tendons in the thigh and buttock. Injury or overuse of the hip can cause bursitis. Activities that can lead to bursitis include twisting and rapid joint movement. Bursitis can cause hip pain. Bursitis usually gets better if you avoid the activity that caused it. If pain lasts or gets worse despite home treatment, your doctor may draw fluid from the bursa through a needle. This may relieve your pain and help your doctor know if you have an infection. If so, your doctor will prescribe antibiotics. If you have inflammation only, you may get a corticosteroid shot to reduce swelling and pain. Sometimes surgery is needed to drain or remove the bursa. Follow-up care is a key part of your treatment and safety. Be sure to make and go to all appointments, and call your doctor if you are having problems. It's also a good idea to know your test results and keep a list of the medicines you take. How can you care for yourself at home? · Put ice or a cold pack on your hip for 10 to 20 minutes at a time. Put a thin cloth between the ice and your skin. · After 3 days of using ice, you may use heat on your hip. You can use a hot water bottle, a heating pad set on low, or a warm, moist towel. · Rest your hip. Stop any activities that cause pain. Switch to activities that do not stress your hip. · Take your medicines exactly as prescribed. Call your doctor if you think you are having a problem with your medicine. · Ask your doctor if you can take an over-the-counter pain medicine, such as acetaminophen (Tylenol), ibuprofen (Advil, Motrin), or naproxen (Aleve). Be safe with medicines. Read and follow all instructions on the label. · To prevent stiffness, gently move the hip joint as much as you can without pain every day. As the pain gets better, keep doing range-of-motion exercises. Ask your doctor for exercises that will make the muscles around the hip joint stronger. Do these as directed. · You can slowly return to the activity that caused the pain, but do it with less effort until you can do it without pain or swelling. Be sure to warm up before and stretch after you do the activity. When should you call for help? Call your doctor now or seek immediate medical care if: 
  · You have a fever.  
  · You have increased swelling or redness in your hip.  
  · You cannot use your hip, or the pain in your hip gets worse.  
 Watch closely for changes in your health, and be sure to contact your doctor if: 
  · You have pain for 2 weeks or longer despite home treatment. Where can you learn more? Go to http://brenton-marisol.info/. Enter R580 in the search box to learn more about \"Hip Bursitis: Care Instructions. \" Current as of: June 26, 2019 Content Version: 12.2 © 0138-0210 [employer]. Care instructions adapted under license by [employer] (which disclaims liability or warranty for this information). If you have questions about a medical condition or this instruction, always ask your healthcare professional. [address] any warranty or liability for your use of this information. Hip Bursitis: Exercises Introduction Here are some examples of exercises for you to try. The exercises may be suggested for a condition or for rehabilitation. Start each exercise slowly. Ease off the exercises if you start to have pain. You will be told when to start these exercises and which ones will work best for you. How to do the exercises Hip rotator stretch 1. Lie on your back with both knees bent and your feet flat on the floor. 2. Put the ankle of your affected leg on your opposite thigh near your knee. 3. Use your hand to gently push your knee away from your body until you feel a gentle stretch around your hip. 4. Hold the stretch for 15 to 30 seconds. 5. Repeat 2 to 4 times. 6. Repeat steps 1 through 5, but this time use your hand to gently pull your knee toward your opposite shoulder. Iliotibial band stretch 1. Lean sideways against a wall. If you are not steady on your feet, hold on to a chair or counter. 2. Stand on the leg with the affected hip, with that leg close to the wall. Then cross your other leg in front of it. 3. Let your affected hip drop out to the side of your body and against wall. Then lean away from your affected hip until you feel a stretch. 4. Hold the stretch for 15 to 30 seconds. 5. Repeat 2 to 4 times. Straight-leg raises to the outside 1. Lie on your side, with your affected hip on top. 2. Tighten the front thigh muscles of your top leg to keep your knee straight. 3. Keep your hip and your leg straight in line with the rest of your body, and keep your knee pointing forward. Do not drop your hip back. 4. Lift your top leg straight up toward the ceiling, about 12 inches off the floor. Hold for about 6 seconds, then slowly lower your leg. 5. Repeat 8 to 12 times. Clamshell 1. Lie on your side, with your affected hip on top and your head propped on a pillow. Keep your feet and knees together and your knees bent. 2. Raise your top knee, but keep your feet together. Do not let your hips roll back. Your legs should open up like a clamshell. 3. Hold for 6 seconds. 4. Slowly lower your knee back down. Rest for 10 seconds. 5. Repeat 8 to 12 times. Follow-up care is a key part of your treatment and safety. Be sure to make and go to all appointments, and call your doctor if you are having problems. It's also a good idea to know your test results and keep a list of the medicines you take. Where can you learn more? Go to http://brenton-marisol.info/. Enter Y918 in the search box to learn more about \"Hip Bursitis: Exercises. \" Current as of: June 26, 2019 Content Version: 12.2 © 2152-0952 [employer]. Care instructions adapted under license by [employer] (which disclaims liability or warranty for this information). If you have questions about a medical condition or this instruction, always ask your healthcare professional. [place] [address] any warranty or liability for your use of this information.

## 2020-02-20 ENCOUNTER — DOCUMENTATION ONLY (OUTPATIENT)
Dept: FAMILY MEDICINE CLINIC | Age: 60
End: 2020-02-20

## 2020-02-20 LAB
ALBUMIN SERPL-MCNC: 5.1 G/DL (ref 3.8–4.9)
ALBUMIN/GLOB SERPL: 2.1 {RATIO} (ref 1.2–2.2)
ALP SERPL-CCNC: 70 IU/L (ref 39–117)
ALT SERPL-CCNC: 22 IU/L (ref 0–32)
AST SERPL-CCNC: 21 IU/L (ref 0–40)
BILIRUB SERPL-MCNC: <0.2 MG/DL (ref 0–1.2)
BUN SERPL-MCNC: 12 MG/DL (ref 6–24)
BUN/CREAT SERPL: 16 (ref 9–23)
CALCIUM SERPL-MCNC: 10.1 MG/DL (ref 8.7–10.2)
CHLORIDE SERPL-SCNC: 95 MMOL/L (ref 96–106)
CHOLEST SERPL-MCNC: 185 MG/DL (ref 100–199)
CO2 SERPL-SCNC: 26 MMOL/L (ref 20–29)
CREAT SERPL-MCNC: 0.75 MG/DL (ref 0.57–1)
ERYTHROCYTE [DISTWIDTH] IN BLOOD BY AUTOMATED COUNT: 13.3 % (ref 11.7–15.4)
GLOBULIN SER CALC-MCNC: 2.4 G/DL (ref 1.5–4.5)
GLUCOSE SERPL-MCNC: 130 MG/DL (ref 65–99)
HCT VFR BLD AUTO: 36.7 % (ref 34–46.6)
HDLC SERPL-MCNC: 58 MG/DL
HGB BLD-MCNC: 12.7 G/DL (ref 11.1–15.9)
INTERPRETATION, 910389: NORMAL
LDLC SERPL CALC-MCNC: 93 MG/DL (ref 0–99)
MCH RBC QN AUTO: 31.8 PG (ref 26.6–33)
MCHC RBC AUTO-ENTMCNC: 34.6 G/DL (ref 31.5–35.7)
MCV RBC AUTO: 92 FL (ref 79–97)
PLATELET # BLD AUTO: 287 X10E3/UL (ref 150–450)
POTASSIUM SERPL-SCNC: 3.7 MMOL/L (ref 3.5–5.2)
PROT SERPL-MCNC: 7.5 G/DL (ref 6–8.5)
RBC # BLD AUTO: 3.99 X10E6/UL (ref 3.77–5.28)
SODIUM SERPL-SCNC: 137 MMOL/L (ref 134–144)
TRIGL SERPL-MCNC: 168 MG/DL (ref 0–149)
VLDLC SERPL CALC-MCNC: 34 MG/DL (ref 5–40)
WBC # BLD AUTO: 4.1 X10E3/UL (ref 3.4–10.8)

## 2020-05-21 ENCOUNTER — VIRTUAL VISIT (OUTPATIENT)
Dept: FAMILY MEDICINE CLINIC | Age: 60
End: 2020-05-21

## 2020-05-21 DIAGNOSIS — F43.23 ADJUSTMENT REACTION WITH ANXIETY AND DEPRESSION: Primary | ICD-10-CM

## 2020-05-21 DIAGNOSIS — F43.21 SITUATIONAL DEPRESSION: ICD-10-CM

## 2020-05-21 DIAGNOSIS — F43.81 GRIEF REACTION WITH PROLONGED BEREAVEMENT: ICD-10-CM

## 2020-05-21 RX ORDER — ESCITALOPRAM OXALATE 10 MG/1
20 TABLET ORAL DAILY
Qty: 60 TAB | Refills: 6 | Status: SHIPPED | OUTPATIENT
Start: 2020-05-21 | End: 2020-06-02 | Stop reason: SDUPTHER

## 2020-05-21 NOTE — PROGRESS NOTES
Chief Complaint   Patient presents with    Letter     patient needs a letter for work still grieving for daughter's death        Patient states she needs time off from work due to still grieving from daughters death, which has been almost a year and a half. Patient stated it is hard for her to concentrate at work. 1. Have you been to the ER, urgent care clinic since your last visit? Hospitalized since your last visit? no    2. Have you seen or consulted any other health care providers outside of the [address] [place] [address] since your last visit? Include any pap smears or colon screening.  no

## 2020-05-21 NOTE — PROGRESS NOTES
HISTORY OF PRESENT ILLNESS  Aamir Rowley is a 63 y.o. female. HPI   Patient encounter by synchronous (real-time) audio-video technology which is patient-initiated. Patient is aware that this encounter is a billable service with coverage as determined by her insurance carrier, all discussed at the time of check-in. Patient has given verbal consent to proceed. C/o increased anxiety related her dtg death which was in December 2018 in a domestic murder-suicide. She has been struggling since this past Mother's Day. She thinks that mother's day triggered her increased anxiety. Feeling overwhelmed with her grief and missing her dtg so much. Her dtg was the youngest of a set of twins and she has 4 other children. She is not sleeping. She is feeling afraid na and scared. No SI/ HI. She wants to resume her counseling. She is having a hard time getting motivated for work and difficulty with concentrations and being focus at work. She feels that she needs some time off to reset and refocus her thoughts. Patient Active Problem List   Diagnosis Code    Anemia NEC     Insomnia G47.00    Encounter for medication monitoring Z51.81    Environmental allergies Z91.09    IGT (impaired glucose tolerance) R73.02    Essential hypertension I10    Mild depression (HCC) F32.0       Current Outpatient Medications   Medication Sig Dispense Refill    escitalopram oxalate (Lexapro) 10 mg tablet Take 2 Tabs by mouth daily.  60 Tab 6    olmesartan-hydroCHLOROthiazide (BENICAR HCT) 40-25 mg per tablet TAKE 1 TABLET BY MOUTH ONCE DAILY 90 Tab 2    ibuprofen (MOTRIN) 800 mg tablet TAKE 1 TABLET BY MOUTH EVERY 8 HOURS IF NEEDED FOR PAIN 90 Tab 1    atorvastatin (LIPITOR) 10 mg tablet TAKE 1 TABLET BY MOUTH EVERY DAY 90 Tab 1    omeprazole (PRILOSEC) 40 mg capsule TAKE 1 CAPSULE BY MOUTH EVERY DAY 90 Cap 3    amLODIPine (NORVASC) 5 mg tablet TAKE 1 TABLET BY MOUTH EVERY MORNING 90 Tab 3    aspirin 81 mg tablet Take 81 mg by mouth daily. No Known Allergies    Past Medical History:   Diagnosis Date    Anemia NEC     Glucose intolerance     HTN (hypertension) 3/26/2010    Insomnia        Past Surgical History:   Procedure Laterality Date    ENDOSCOPY, COLON, DIAGNOSTIC      HX GYN  2006    fibroids removed from uterus    HX TUBAL LIGATION  1993       Family History   Problem Relation Age of Onset    Stroke Mother     Hypertension Sister     Stroke Brother     Hypertension Sister     No Known Problems Sister     No Known Problems Sister     No Known Problems Sister     Stroke Brother        Social History     Tobacco Use    Smoking status: Never Smoker    Smokeless tobacco: Never Used   Substance Use Topics    Alcohol use: Yes     Comment: occas        Review of Systems   Constitutional: Negative for malaise/fatigue. HENT: Negative for congestion. Eyes: Negative for blurred vision. Respiratory: Negative for cough and shortness of breath. Cardiovascular: Negative for chest pain, palpitations and leg swelling. Gastrointestinal: Negative for abdominal pain, constipation and heartburn. Genitourinary: Negative for dysuria, frequency and urgency. Musculoskeletal: Negative for back pain and joint pain. Neurological: Negative for dizziness, tingling and headaches. Endo/Heme/Allergies: Negative for environmental allergies. Psychiatric/Behavioral: Positive for depression. Negative for hallucinations, substance abuse and suicidal ideas. The patient is nervous/anxious and has insomnia. Physical Exam  Constitutional:       Appearance: Normal appearance. Pulmonary:      Effort: Pulmonary effort is normal.   Neurological:      Mental Status: She is alert. Psychiatric:         Mood and Affect: Mood is depressed. Affect is tearful. Speech: Speech normal.         Behavior: Behavior normal.         Thought Content:  Thought content normal.         ASSESSMENT and PLAN  Diagnoses and all orders for this visit:    1. Adjustment reaction with anxiety and depression  -     Increase escitalopram oxalate (Lexapro) 10 mg tablet; Take 2 Tabs by mouth daily. 2. Grief reaction with prolonged bereavement  -     Increase escitalopram oxalate (Lexapro) 10 mg tablet; Take 2 Tabs by mouth daily. Referred back to see her counselor that she had seen previously, Rubi. She is taking tylenol PM to help with her sleep. Also has been trying meditation music to help. RTW 6/8/2020. reviewed medications and side effects in detail      Due to this being a TeleHealth encounter (During [address] emergency), evaluation of the following organ systems was limited: Vital/Constitutional/EENT/Resp/CV/GI//MS/Neuro/Skin/Heme-Lymph-Imm. Pursuant to the emergency declaration under the [employer] and [employer], 1135 waiver authority and the [employer] and [employer] Act, this Virtual Visit was conducted, with patient's consent, to reduce the patient's risk of exposure to COVID-19 and provide continuity of care for an established patient. Services were provided through a video synchronous discussion virtually to substitute for in-person appointment. This visit was completed using doxy. me    Time in virtual visit: 12  minutes

## 2020-05-21 NOTE — LETTER
NOTIFICATION RETURN TO WORK / SCHOOL 
 
5/21/2020 12:02 PM 
 
Ms. Tiny Robbins [address] [address] To Whom It May Concern: 
 
Tiny Robbins is currently under the care of [facility]. She will return to work/school on: June 8, 2020 If there are questions or concerns please have the patient contact our office. Sincerely, Richi Paul MD

## 2020-06-01 DIAGNOSIS — I10 ESSENTIAL HYPERTENSION: ICD-10-CM

## 2020-06-01 RX ORDER — AMLODIPINE BESYLATE 5 MG/1
TABLET ORAL
Qty: 90 TAB | Refills: 1 | Status: SHIPPED | OUTPATIENT
Start: 2020-06-01 | End: 2020-09-16

## 2020-06-01 NOTE — TELEPHONE ENCOUNTER
----- Message from Dany Iqbal sent at 6/1/2020  1:25 PM EDT -----  Regarding: Dr. Merry Wright: [id number] (if not patient):  Relationship of caller (if not patient):   Best contact number(s): [phone]  Name of medication and dosage if known: Amlodipine   Is patient out of this medication (yes/no): yes    Pharmacy name: [facility] ([id number])  Pharmacy listed in chart? (yes/no): yes  Pharmacy phone number:   Details to clarify the request:  Pharmacy has not received response. Fax sent last week. Pt stated she also had pharmacy resend request Friday. Pt informed to check with pharmacy again as I would send message to check.

## 2020-06-02 ENCOUNTER — VIRTUAL VISIT (OUTPATIENT)
Dept: FAMILY MEDICINE CLINIC | Age: 60
End: 2020-06-02

## 2020-06-02 DIAGNOSIS — F43.81 GRIEF REACTION WITH PROLONGED BEREAVEMENT: ICD-10-CM

## 2020-06-02 DIAGNOSIS — F43.23 ADJUSTMENT REACTION WITH ANXIETY AND DEPRESSION: ICD-10-CM

## 2020-06-02 RX ORDER — ESCITALOPRAM OXALATE 10 MG/1
15 TABLET ORAL DAILY
Qty: 60 TAB | Refills: 6
Start: 2020-06-02 | End: 2020-10-26 | Stop reason: SDUPTHER

## 2020-06-02 NOTE — PROGRESS NOTES
HISTORY OF PRESENT ILLNESS  Errol Ball is a 63 y.o. female. HPI   Patient encounter by synchronous (real-time) audio-video technology which is patient-initiated. Patient is aware that this encounter is a billable service with coverage as determined by her insurance carrier, all discussed at the time of check-in. Patient has given verbal consent to proceed. For follow up on depression and anxiety. C/o increased anxiety related her dtg death which was in December 2018 in a domestic murder-suicide. She has been struggling since this past Mother's Day. She thinks that mother's day triggered her increased anxiety. She is feeling better. Less anxious and feeling more in control. Less depressed and still has her moments of crying spells but less often. She thinks that the increase lexapro initially did help but after taking it for a few days it take her feels more tired. She reduced it back to 1 pills. She has not been able to get in to see her counselor. feels however that is is ready to return to work. Still restless at night but she is taking tyelon PM on some nights which does help her with sleep. Patient Active Problem List   Diagnosis Code    Anemia NEC     Insomnia G47.00    Encounter for medication monitoring Z51.81    Environmental allergies Z91.09    IGT (impaired glucose tolerance) R73.02    Essential hypertension I10    Mild depression (HCC) F32.0       Current Outpatient Medications   Medication Sig Dispense Refill    escitalopram oxalate (Lexapro) 10 mg tablet Take 1.5 Tabs by mouth daily.  60 Tab 6    amLODIPine (NORVASC) 5 mg tablet TAKE 1 TABLET BY MOUTH EVERY MORNING 90 Tab 1    olmesartan-hydroCHLOROthiazide (BENICAR HCT) 40-25 mg per tablet TAKE 1 TABLET BY MOUTH ONCE DAILY 90 Tab 2    ibuprofen (MOTRIN) 800 mg tablet TAKE 1 TABLET BY MOUTH EVERY 8 HOURS IF NEEDED FOR PAIN 90 Tab 1    atorvastatin (LIPITOR) 10 mg tablet TAKE 1 TABLET BY MOUTH EVERY DAY 90 Tab 1    omeprazole (PRILOSEC) 40 mg capsule TAKE 1 CAPSULE BY MOUTH EVERY DAY 90 Cap 3    aspirin 81 mg tablet Take 81 mg by mouth daily.          No Known Allergies    Past Medical History:   Diagnosis Date    Anemia NEC     Glucose intolerance     HTN (hypertension) 3/26/2010    Insomnia        Past Surgical History:   Procedure Laterality Date    ENDOSCOPY, COLON, DIAGNOSTIC      HX GYN  2006    fibroids removed from uterus    HX TUBAL LIGATION  1993       Family History   Problem Relation Age of Onset    Stroke Mother     Hypertension Sister     Stroke Brother     Hypertension Sister     No Known Problems Sister     No Known Problems Sister     No Known Problems Sister     Stroke Brother        Social History     Tobacco Use    Smoking status: Never Smoker    Smokeless tobacco: Never Used   Substance Use Topics    Alcohol use: Yes     Comment: occas        Lab Results   Component Value Date/Time    WBC 4.1 02/19/2020 11:41 AM    HGB 12.7 02/19/2020 11:41 AM    HCT 36.7 02/19/2020 11:41 AM    PLATELET 835 11/09/5542 11:41 AM    MCV 92 02/19/2020 11:41 AM     Lab Results   Component Value Date/Time    Cholesterol, total 185 02/19/2020 11:41 AM    HDL Cholesterol 58 02/19/2020 11:41 AM    LDL, calculated 93 02/19/2020 11:41 AM    Triglyceride 168 (H) 02/19/2020 11:41 AM    CHOL/HDL Ratio 2.6 05/04/2010 12:30 PM     Lab Results   Component Value Date/Time    TSH 1.820 04/28/2015 04:05 PM    T4, Free 1.15 04/28/2015 04:05 PM      Lab Results   Component Value Date/Time    Sodium 137 02/19/2020 11:41 AM    Potassium 3.7 02/19/2020 11:41 AM    Chloride 95 (L) 02/19/2020 11:41 AM    CO2 26 02/19/2020 11:41 AM    Anion gap 10 05/04/2010 12:30 PM    Glucose 130 (H) 02/19/2020 11:41 AM    BUN 12 02/19/2020 11:41 AM    Creatinine 0.75 02/19/2020 11:41 AM    BUN/Creatinine ratio 16 02/19/2020 11:41 AM    GFR est  02/19/2020 11:41 AM    GFR est non-AA 88 02/19/2020 11:41 AM    Calcium 10.1 02/19/2020 11:41 AM    Bilirubin, total <0.2 02/19/2020 11:41 AM    ALT (SGPT) 22 02/19/2020 11:41 AM    Alk. phosphatase 70 02/19/2020 11:41 AM    Protein, total 7.5 02/19/2020 11:41 AM    Albumin 5.1 (H) 02/19/2020 11:41 AM    Globulin 4.0 01/04/2010 12:44 PM    A-G Ratio 2.1 02/19/2020 11:41 AM      Lab Results   Component Value Date/Time    Hemoglobin A1c 5.7 (H) 07/22/2013 02:43 PM    Hemoglobin A1c (POC) 6.3 02/19/2020 11:45 AM         Review of Systems   Constitutional: Negative for malaise/fatigue. HENT: Negative for congestion. Eyes: Negative for blurred vision. Respiratory: Negative for cough and shortness of breath. Cardiovascular: Negative for chest pain, palpitations and leg swelling. Gastrointestinal: Negative for abdominal pain, constipation and heartburn. Genitourinary: Negative for dysuria, frequency and urgency. Musculoskeletal: Negative for back pain and joint pain. Neurological: Negative for dizziness, tingling and headaches. Endo/Heme/Allergies: Negative for environmental allergies. Psychiatric/Behavioral: Negative for depression. The patient does not have insomnia. Physical Exam  Constitutional:       Appearance: Normal appearance. Pulmonary:      Effort: Pulmonary effort is normal.   Neurological:      Mental Status: She is alert. Psychiatric:         Mood and Affect: Mood normal.         Thought Content: Thought content normal.      Comments: Looks happier and more emotionally in control. ASSESSMENT and PLAN  Diagnoses and all orders for this visit:    1. Adjustment reaction with anxiety and depression//  2. Grief reaction with prolonged bereavement  -    Increase back to escitalopram oxalate (Lexapro) 10 mg tablet; Take 1.5 Tabs by mouth daily. Continue with Tylenol PM as needed for sleep.    -     REFERRAL TO PSYCHOLOGY  Feels that she can return to work on 6/8/2020 as planned.       reviewed medications and side effects in detail    I have discussed diagnosis listed in this note with pt and/or family. I have discussed treatment plans and options and the risk/benefit analysis of those options, including safe use of medications and possible medication side effects. Through the use of shared decision making we have agreed to the above plan. The patient has received an after-visit summary and questions were answered concerning future plans and follow up. Advise pt of any urgent changes then to proceed to the ER. Due to this being a TeleHealth encounter (During [address] emergency), evaluation of the following organ systems was limited: Vital/Constitutional/EENT/Resp/CV/GI//MS/Neuro/Skin/Heme-Lymph-Imm. Pursuant to the emergency declaration under the [address] and [facility], [facility]5 waiver authority and the [employer] Act, this Virtual Visit was conducted, with patient's consent, to reduce the patient's risk of exposure to COVID-19 and provide continuity of care for an established patient. Services were provided through a video synchronous discussion virtually to substitute for in-person appointment. This visit was completed using [email or web address]    Time in virtual visit: 10  minutes

## 2020-06-02 NOTE — PROGRESS NOTES
Chief Complaint   Patient presents with    Medication Evaluation     Lexapro increased- 2 week follow up    Anxiety     2 week follow up     Patient states since the Lexapro was increased she felt better but after taking for a few days she did not like the way it made her feel and wondering if she could take 1 daily and 2 daily when she is feeling the \"anxiety/stress\". 1. Have you been to the ER, urgent care clinic since your last visit? Hospitalized since your last visit? no    2. Have you seen or consulted any other health care providers outside of the [address] [place] [address] since your last visit? Include any pap smears or colon screening.  no

## 2020-06-04 ENCOUNTER — TELEPHONE (OUTPATIENT)
Dept: FAMILY MEDICINE CLINIC | Age: 60
End: 2020-06-04

## 2020-06-04 NOTE — TELEPHONE ENCOUNTER
Spoke with patient, she has an appointment with John Nix on 6/8/20. Would like a letter stating her to extend out of work through next week. Please call patient and advise.

## 2020-06-04 NOTE — LETTER
NOTIFICATION RETURN TO WORK / SCHOOL 
 
6/5/2020 11:40 AM 
 
Ms. Anabel Castillo [address] [id number] To Whom It May Concern: 
 
Anabel Castillo is currently under the care of [facility]. She will return to work on Melinda 15/2020. If there are questions or concerns please have the patient contact our office. Sincerely, Jennifer Diop MD

## 2020-06-04 NOTE — TELEPHONE ENCOUNTER
Pt would like to extend her time out of work until week of June 8, 2020 - after she has spoken to a counselor       Best number to reach her is  [phone]

## 2020-06-05 NOTE — TELEPHONE ENCOUNTER
Called patient and informed that Dr. Severino has extend work note to 6/15/2020. Patient stated she will get it off her mychart.

## 2020-06-05 NOTE — TELEPHONE ENCOUNTER
Spoke with patient and would like to extend her work note to 6/15/2020. She has an appt with Jay Aceves next week and knows it will be emotional. Informed patient will check with Dr. Yadira Wilhelm. Patient stated that would be fine and she would be able to get letter of her mychart.

## 2020-06-10 ENCOUNTER — OFFICE VISIT (OUTPATIENT)
Dept: FAMILY MEDICINE CLINIC | Age: 60
End: 2020-06-10

## 2020-06-10 VITALS
HEIGHT: 62 IN | DIASTOLIC BLOOD PRESSURE: 67 MMHG | OXYGEN SATURATION: 98 % | WEIGHT: 159.2 LBS | BODY MASS INDEX: 29.3 KG/M2 | RESPIRATION RATE: 16 BRPM | HEART RATE: 72 BPM | TEMPERATURE: 97.5 F | SYSTOLIC BLOOD PRESSURE: 127 MMHG

## 2020-06-10 DIAGNOSIS — F43.23 ADJUSTMENT REACTION WITH ANXIETY AND DEPRESSION: Primary | ICD-10-CM

## 2020-06-10 DIAGNOSIS — R73.02 IGT (IMPAIRED GLUCOSE TOLERANCE): ICD-10-CM

## 2020-06-10 DIAGNOSIS — Z12.31 ENCOUNTER FOR SCREENING MAMMOGRAM FOR BREAST CANCER: ICD-10-CM

## 2020-06-10 DIAGNOSIS — E78.2 MIXED HYPERLIPIDEMIA: ICD-10-CM

## 2020-06-10 DIAGNOSIS — Z23 NEED FOR SHINGLES VACCINE: ICD-10-CM

## 2020-06-10 DIAGNOSIS — I10 ESSENTIAL HYPERTENSION: ICD-10-CM

## 2020-06-10 DIAGNOSIS — Z51.81 ENCOUNTER FOR MEDICATION MONITORING: ICD-10-CM

## 2020-06-10 DIAGNOSIS — F51.01 PRIMARY INSOMNIA: ICD-10-CM

## 2020-06-10 RX ORDER — TRAZODONE HYDROCHLORIDE 50 MG/1
25-50 TABLET ORAL
Qty: 30 TAB | Refills: 3 | Status: SHIPPED | OUTPATIENT
Start: 2020-06-10 | End: 2020-09-16

## 2020-06-10 RX ORDER — HYDROXYZINE 25 MG/1
25 TABLET, FILM COATED ORAL
Qty: 30 TAB | Refills: 3 | Status: SHIPPED | OUTPATIENT
Start: 2020-06-10 | End: 2020-06-10 | Stop reason: SINTOL

## 2020-06-10 NOTE — PROGRESS NOTES
HISTORY OF PRESENT ILLNESS  Gwendolyn Weiss is a 63 y.o. female. HPI   For follow up on depression and anxiety. C/o increased anxiety related her dtg death which was in December 2018 in a domestic murder-suicide. She had been struggling since this past Mother's Day. She thinks that Mother's day triggered her increased anxiety. She is feeling better. Less anxious and feeling more in control. Less depressed. She has been talking with her therapist which has been very helpful. The increase lexapro has helped. Feels however that is is ready to return to work. Still restless at night and some night she is staying away until 4 to 5 am.     HTN follow up:  Compliant w/ meds, low salt diet, and exercise. No home bp monitoring. No swelling, headache or dizziness. No chest pain, SOB, palpitations. Otherwise feeling well since the last visit. Glucose intolerance reveiw:  She has IGT. Last a1c level was 6.3%. She has been trying to eat healthier. Has not gotten in much exercise. Diabetic ROS - further diabetic ROS: no polyuria or polydipsia, no chest pain, dyspnea or TIA's, no numbness, tingling or pain in extremities, no unusual visual symptoms. Lab review: orders written for new lab studies as appropriate; see orders      Patient Active Problem List   Diagnosis Code    Anemia NEC     Insomnia G47.00    Encounter for medication monitoring Z51.81    Environmental allergies Z91.09    IGT (impaired glucose tolerance) R73.02    Essential hypertension I10    Mild depression (HCC) F32.0       Current Outpatient Medications   Medication Sig Dispense Refill    varicella-zoster recombinant, PF, (SHINGRIX) 50 mcg/0.5 mL susr injection 0.5 mL by IntraMUSCular route once for 1 dose. Repeat second dose in 6 months. 0.5 mL 1    traZODone (DESYREL) 50 mg tablet Take 0.5-1 Tabs by mouth nightly as needed for Sleep. 30 Tab 3    escitalopram oxalate (Lexapro) 10 mg tablet Take 1.5 Tabs by mouth daily.  61 Tab 6    amLODIPine (NORVASC) 5 mg tablet TAKE 1 TABLET BY MOUTH EVERY MORNING 90 Tab 1    olmesartan-hydroCHLOROthiazide (BENICAR HCT) 40-25 mg per tablet TAKE 1 TABLET BY MOUTH ONCE DAILY 90 Tab 2    ibuprofen (MOTRIN) 800 mg tablet TAKE 1 TABLET BY MOUTH EVERY 8 HOURS IF NEEDED FOR PAIN 90 Tab 1    atorvastatin (LIPITOR) 10 mg tablet TAKE 1 TABLET BY MOUTH EVERY DAY 90 Tab 1    omeprazole (PRILOSEC) 40 mg capsule TAKE 1 CAPSULE BY MOUTH EVERY DAY 90 Cap 3    aspirin 81 mg tablet Take 81 mg by mouth daily.          No Known Allergies    Past Medical History:   Diagnosis Date    Anemia NEC     Glucose intolerance     HTN (hypertension) 3/26/2010    Insomnia        Past Surgical History:   Procedure Laterality Date    ENDOSCOPY, COLON, DIAGNOSTIC      HX GYN  2006    fibroids removed from uterus    HX TUBAL LIGATION  1993       Family History   Problem Relation Age of Onset    Stroke Mother     Hypertension Sister     Stroke Brother     Hypertension Sister     No Known Problems Sister     No Known Problems Sister     No Known Problems Sister     Stroke Brother        Social History     Tobacco Use    Smoking status: Never Smoker    Smokeless tobacco: Never Used   Substance Use Topics    Alcohol use: Yes     Comment: occas        Lab Results   Component Value Date/Time    WBC 4.1 02/19/2020 11:41 AM    HGB 12.7 02/19/2020 11:41 AM    HCT 36.7 02/19/2020 11:41 AM    PLATELET 836 85/37/5610 11:41 AM    MCV 92 02/19/2020 11:41 AM     Lab Results   Component Value Date/Time    Cholesterol, total 185 02/19/2020 11:41 AM    HDL Cholesterol 58 02/19/2020 11:41 AM    LDL, calculated 93 02/19/2020 11:41 AM    Triglyceride 168 (H) 02/19/2020 11:41 AM    CHOL/HDL Ratio 2.6 05/04/2010 12:30 PM     Lab Results   Component Value Date/Time    TSH 1.820 04/28/2015 04:05 PM    T4, Free 1.15 04/28/2015 04:05 PM      Lab Results   Component Value Date/Time    Sodium 137 02/19/2020 11:41 AM    Potassium 3.7 02/19/2020 11:41 AM    Chloride 95 (L) 02/19/2020 11:41 AM    CO2 26 02/19/2020 11:41 AM    Anion gap 10 05/04/2010 12:30 PM    Glucose 130 (H) 02/19/2020 11:41 AM    BUN 12 02/19/2020 11:41 AM    Creatinine 0.75 02/19/2020 11:41 AM    BUN/Creatinine ratio 16 02/19/2020 11:41 AM    GFR est  02/19/2020 11:41 AM    GFR est non-AA 88 02/19/2020 11:41 AM    Calcium 10.1 02/19/2020 11:41 AM    Bilirubin, total <0.2 02/19/2020 11:41 AM    ALT (SGPT) 22 02/19/2020 11:41 AM    Alk. phosphatase 70 02/19/2020 11:41 AM    Protein, total 7.5 02/19/2020 11:41 AM    Albumin 5.1 (H) 02/19/2020 11:41 AM    Globulin 4.0 01/04/2010 12:44 PM    A-G Ratio 2.1 02/19/2020 11:41 AM      Lab Results   Component Value Date/Time    Hemoglobin A1c 5.7 (H) 07/22/2013 02:43 PM    Hemoglobin A1c (POC) 6.3 02/19/2020 11:45 AM         Review of Systems   Constitutional: Negative for malaise/fatigue. HENT: Negative for congestion. Eyes: Negative for blurred vision. Respiratory: Negative for cough and shortness of breath. Cardiovascular: Negative for chest pain, palpitations and leg swelling. Gastrointestinal: Negative for abdominal pain, constipation and heartburn. Genitourinary: Negative for dysuria, frequency and urgency. Musculoskeletal: Negative for back pain and joint pain. Neurological: Negative for dizziness, tingling and headaches. Endo/Heme/Allergies: Negative for environmental allergies. Psychiatric/Behavioral: Negative for depression. The patient does not have insomnia. Physical Exam  Vitals signs and nursing note reviewed. Constitutional:       Appearance: Normal appearance. She is well-developed.       Comments: /67 (BP 1 Location: Left arm, BP Patient Position: Sitting)   Pulse 72   Temp 97.5 °F (36.4 °C) (Oral)   Resp 16   Ht 5' 2\" (1.575 m)   Wt 159 lb 3.2 oz (72.2 kg)   LMP 03/01/2013   SpO2 98%   BMI 29.12 kg/m²    HENT:      Right Ear: Tympanic membrane and ear canal normal. Left Ear: Tympanic membrane and ear canal normal.      Nose: No mucosal edema or rhinorrhea. Neck:      Musculoskeletal: Normal range of motion and neck supple. Thyroid: No thyromegaly. Cardiovascular:      Rate and Rhythm: Normal rate and regular rhythm. Heart sounds: Normal heart sounds. No gallop. Pulmonary:      Effort: Pulmonary effort is normal.      Breath sounds: Normal breath sounds. Abdominal:      General: Bowel sounds are normal.      Palpations: Abdomen is soft. There is no mass. Tenderness: There is no abdominal tenderness. Musculoskeletal: Normal range of motion. General: No swelling. Lymphadenopathy:      Cervical: No cervical adenopathy. Skin:     General: Skin is warm and dry. Psychiatric:         Mood and Affect: Mood normal. Mood is not depressed. Affect is not tearful. Behavior: Behavior normal.         ASSESSMENT and PLAN  Diagnoses and all orders for this visit:    1. Adjustment reaction with anxiety and depression  Improved. She will continue with her counseling. 2. Primary insomnia  -     Add traZODone (DESYREL) 50 mg tablet; Take 0.5-1 Tabs by mouth nightly as needed for Sleep. 3. Essential hypertension  Stable   Discussed sodium restriction, high k rich diet, maintaining ideal body weight and regular exercise program such as daily walking 30 min perday 4-5 times per week, as physiologic means to achieve blood pressure control.  Medication compliance advised. 4. Mixed hyperlipidemia  Continue to monitor. Work on diet and exercise. Stable with Lipitor    5. IGT (impaired glucose tolerance)  -     HEMOGLOBIN A1C WITH EAG    6. Encounter for medication monitoring  -     METABOLIC PANEL, BASIC    7. Encounter for screening mammogram for breast cancer  -     [facility] MAMMO BI SCREENING INCL CAD; Future    8.  Need for shingles vaccine  -     varicella-zoster recombinant, PF, (SHINGRIX) 50 mcg/0.5 mL susr injection; 0.5 mL by IntraMUSCular route once for 1 dose. Repeat second dose in 6 months. Follow-up and Dispositions    · Return in about 4 months (around 10/10/2020). reviewed diet, exercise and weight control  cardiovascular risk and specific lipid/LDL goals reviewed  reviewed medications and side effects in detail  specific diabetic recommendations: low cholesterol diet, weight control and daily exercise discussed and glycohemoglobin and other lab monitoring discussed     I have discussed diagnosis listed in this note with pt and/or family. I have discussed treatment plans and options and the risk/benefit analysis of those options, including safe use of medications and possible medication side effects. Through the use of shared decision making we have agreed to the above plan. The patient has received an after-visit summary and questions were answered concerning future plans and follow up. Advise pt of any urgent changes then to proceed to the ER.

## 2020-06-10 NOTE — PROGRESS NOTES
Chief Complaint   Patient presents with    Anxiety     follow up       Patient states she is still doing phone visits with Dario Kelley [facility] and doing phone visits every 2 weeks. Mammogram 2/14/2019. Patient is aware she is due     Colonoscopy 6/19/2013 by Dr. Tarik Pink repeat in 10 years    1. Have you been to the ER, urgent care clinic since your last visit? Hospitalized since your last visit? No    2. Have you seen or consulted any other health care providers outside of the [address] [place] [address] since your last visit? Include any pap smears or colon screening.  No

## 2020-06-17 LAB
BUN SERPL-MCNC: 10 MG/DL (ref 6–24)
BUN/CREAT SERPL: 13 (ref 9–23)
CALCIUM SERPL-MCNC: 9.9 MG/DL (ref 8.7–10.2)
CHLORIDE SERPL-SCNC: 91 MMOL/L (ref 96–106)
CO2 SERPL-SCNC: 23 MMOL/L (ref 20–29)
CREAT SERPL-MCNC: 0.75 MG/DL (ref 0.57–1)
EST. AVERAGE GLUCOSE BLD GHB EST-MCNC: 137 MG/DL
GLUCOSE SERPL-MCNC: 103 MG/DL (ref 65–99)
HBA1C MFR BLD: 6.4 % (ref 4.8–5.6)
POTASSIUM SERPL-SCNC: 3.7 MMOL/L (ref 3.5–5.2)
SODIUM SERPL-SCNC: 139 MMOL/L (ref 134–144)

## 2020-06-22 ENCOUNTER — TELEPHONE (OUTPATIENT)
Dept: FAMILY MEDICINE CLINIC | Age: 60
End: 2020-06-22

## 2020-06-22 NOTE — TELEPHONE ENCOUNTER
A1c level is 6.4%. She has appt with a nutritionist.  She wants to work on the diet prior to starting on medication. Will follow up with her next appt in October.

## 2020-07-28 RX ORDER — ATORVASTATIN CALCIUM 10 MG/1
TABLET, FILM COATED ORAL
Qty: 90 TAB | Refills: 3 | Status: SHIPPED | OUTPATIENT
Start: 2020-07-28 | End: 2022-01-14

## 2020-07-28 NOTE — TELEPHONE ENCOUNTER
Last visit:6/10/20  Next visit:10/12/20  Previous refill 10/21/19(90+1R)    Requested Prescriptions     Pending Prescriptions Disp Refills    atorvastatin (LIPITOR) 10 mg tablet 90 Tab 1     Sig: TAKE 1 TABLET BY MOUTH EVERY DAY

## 2020-08-30 DIAGNOSIS — K21.9 GASTROESOPHAGEAL REFLUX DISEASE WITHOUT ESOPHAGITIS: ICD-10-CM

## 2020-08-31 RX ORDER — OMEPRAZOLE 40 MG/1
CAPSULE, DELAYED RELEASE ORAL
Qty: 90 CAP | Refills: 3 | Status: SHIPPED | OUTPATIENT
Start: 2020-08-31 | End: 2021-08-11

## 2020-09-16 DIAGNOSIS — I10 ESSENTIAL HYPERTENSION: ICD-10-CM

## 2020-09-16 DIAGNOSIS — F51.01 PRIMARY INSOMNIA: ICD-10-CM

## 2020-09-16 RX ORDER — TRAZODONE HYDROCHLORIDE 50 MG/1
TABLET ORAL
Qty: 30 TAB | Refills: 3 | Status: SHIPPED | OUTPATIENT
Start: 2020-09-16

## 2020-09-16 RX ORDER — AMLODIPINE BESYLATE 5 MG/1
TABLET ORAL
Qty: 90 TAB | Refills: 1 | Status: SHIPPED | OUTPATIENT
Start: 2020-09-16 | End: 2021-03-05

## 2020-10-20 ENCOUNTER — OFFICE VISIT (OUTPATIENT)
Dept: FAMILY MEDICINE CLINIC | Age: 60
End: 2020-10-20
Payer: COMMERCIAL

## 2020-10-20 VITALS
BODY MASS INDEX: 28.6 KG/M2 | HEIGHT: 62 IN | SYSTOLIC BLOOD PRESSURE: 125 MMHG | OXYGEN SATURATION: 98 % | DIASTOLIC BLOOD PRESSURE: 71 MMHG | HEART RATE: 77 BPM | TEMPERATURE: 96.9 F | RESPIRATION RATE: 18 BRPM | WEIGHT: 155.4 LBS

## 2020-10-20 DIAGNOSIS — E78.2 MIXED HYPERLIPIDEMIA: ICD-10-CM

## 2020-10-20 DIAGNOSIS — F51.01 PRIMARY INSOMNIA: ICD-10-CM

## 2020-10-20 DIAGNOSIS — Z79.899 ENCOUNTER FOR LONG-TERM (CURRENT) USE OF MEDICATIONS: ICD-10-CM

## 2020-10-20 DIAGNOSIS — I10 ESSENTIAL HYPERTENSION: Primary | ICD-10-CM

## 2020-10-20 DIAGNOSIS — F43.81 GRIEF REACTION WITH PROLONGED BEREAVEMENT: ICD-10-CM

## 2020-10-20 DIAGNOSIS — R73.02 IGT (IMPAIRED GLUCOSE TOLERANCE): ICD-10-CM

## 2020-10-20 DIAGNOSIS — F43.23 ADJUSTMENT REACTION WITH ANXIETY AND DEPRESSION: ICD-10-CM

## 2020-10-20 LAB
GLUCOSE POC: 121 MG/DL
HBA1C MFR BLD HPLC: 6.1 %

## 2020-10-20 PROCEDURE — 99213 OFFICE O/P EST LOW 20 MIN: CPT | Performed by: FAMILY MEDICINE

## 2020-10-20 PROCEDURE — 82947 ASSAY GLUCOSE BLOOD QUANT: CPT | Performed by: FAMILY MEDICINE

## 2020-10-20 PROCEDURE — 83036 HEMOGLOBIN GLYCOSYLATED A1C: CPT | Performed by: FAMILY MEDICINE

## 2020-10-20 NOTE — PROGRESS NOTES
HISTORY OF PRESENT ILLNESS  Francois Ha is a 62 y.o. female. For follow up on depression and anxiety. C/o increased anxiety related her dtg death which was in December 2018 in a domestic murder-suicide. She had been struggling since this past Mother's Day. She thinks that Mother's day triggered her increased anxiety. Overall she is feeling better. Mood is improved. She is feeling less stress. Having more days that she is feeling \"happy\" and feeling back in control. Still has some moments of anxiety that will come on but overall she is able to do meditation to help calm herself. Sleeping better on most nights. She is doing counseling with Rubi which has been very helpful. HTN follow up:  Compliant w/ meds, low salt diet, and exercise. No home bp monitoring. No swelling, headache or dizziness. No chest pain, SOB, palpitations. Otherwise feeling well since the last visit. Glucose intolerance reveiw:  She has IGT. Last a1c level was 6.4%. She has been trying to eat healthier. Has been trying to get in more exercise. Diabetic ROS - further diabetic ROS: no polyuria or polydipsia, no chest pain, dyspnea or TIA's, no numbness, tingling or pain in extremities, no unusual visual symptoms.    Lab review: orders written for new lab studies as appropriate; see orders      Patient Active Problem List   Diagnosis Code    Anemia NEC     Insomnia G47.00    Encounter for medication monitoring Z51.81    Environmental allergies Z91.09    IGT (impaired glucose tolerance) R73.02    Essential hypertension I10    Mild depression (HCC) F32.0       Current Outpatient Medications   Medication Sig Dispense Refill    traZODone (DESYREL) 50 mg tablet TAKE 1/2-1 TABLET BY MOUTH NIGHTLY AS NEEDED FOR SLEEP 30 Tab 3    amLODIPine (NORVASC) 5 mg tablet TAKE 1 TABLET BY MOUTH EVERY DAY IN THE MORNING 90 Tab 1    omeprazole (PRILOSEC) 40 mg capsule TAKE 1 CAPSULE BY MOUTH EVERY DAY 90 Cap 3    atorvastatin (LIPITOR) 10 mg tablet TAKE 1 TABLET BY MOUTH EVERY DAY 90 Tab 3    escitalopram oxalate (Lexapro) 10 mg tablet Take 1.5 Tabs by mouth daily. 60 Tab 6    olmesartan-hydroCHLOROthiazide (BENICAR HCT) 40-25 mg per tablet TAKE 1 TABLET BY MOUTH ONCE DAILY 90 Tab 2    ibuprofen (MOTRIN) 800 mg tablet TAKE 1 TABLET BY MOUTH EVERY 8 HOURS IF NEEDED FOR PAIN 90 Tab 1    aspirin 81 mg tablet Take 81 mg by mouth daily.          No Known Allergies      Past Medical History:   Diagnosis Date    Anemia NEC     Glucose intolerance     HTN (hypertension) 3/26/2010    Insomnia          Past Surgical History:   Procedure Laterality Date    ENDOSCOPY, COLON, DIAGNOSTIC      HX GYN  2006    fibroids removed from uterus    HX TUBAL LIGATION  1993         Family History   Problem Relation Age of Onset    Stroke Mother     Hypertension Sister     Stroke Brother     Hypertension Sister     No Known Problems Sister     No Known Problems Sister     No Known Problems Sister     Stroke Brother     Diabetes Maternal Uncle        Social History     Tobacco Use    Smoking status: Never Smoker    Smokeless tobacco: Never Used   Substance Use Topics    Alcohol use: Yes     Comment: occas        Lab Results   Component Value Date/Time    WBC 4.1 02/19/2020 11:41 AM    HGB 12.7 02/19/2020 11:41 AM    HCT 36.7 02/19/2020 11:41 AM    PLATELET 142 79/12/7957 11:41 AM    MCV 92 02/19/2020 11:41 AM     Lab Results   Component Value Date/Time    Cholesterol, total 185 02/19/2020 11:41 AM    HDL Cholesterol 58 02/19/2020 11:41 AM    LDL, calculated 93 02/19/2020 11:41 AM    Triglyceride 168 (H) 02/19/2020 11:41 AM    CHOL/HDL Ratio 2.6 05/04/2010 12:30 PM     Lab Results   Component Value Date/Time    TSH 1.820 04/28/2015 04:05 PM    T4, Free 1.15 04/28/2015 04:05 PM      Lab Results   Component Value Date/Time    Sodium 139 06/16/2020 04:43 PM    Potassium 3.7 06/16/2020 04:43 PM    Chloride 91 (L) 06/16/2020 04:43 PM CO2 23 06/16/2020 04:43 PM    Anion gap 10 05/04/2010 12:30 PM    Glucose 103 (H) 06/16/2020 04:43 PM    BUN 10 06/16/2020 04:43 PM    Creatinine 0.75 06/16/2020 04:43 PM    BUN/Creatinine ratio 13 06/16/2020 04:43 PM    GFR est  06/16/2020 04:43 PM    GFR est non-AA 88 06/16/2020 04:43 PM    Calcium 9.9 06/16/2020 04:43 PM    Bilirubin, total <0.2 02/19/2020 11:41 AM    ALT (SGPT) 22 02/19/2020 11:41 AM    Alk. phosphatase 70 02/19/2020 11:41 AM    Protein, total 7.5 02/19/2020 11:41 AM    Albumin 5.1 (H) 02/19/2020 11:41 AM    Globulin 4.0 01/04/2010 12:44 PM    A-G Ratio 2.1 02/19/2020 11:41 AM      Lab Results   Component Value Date/Time    Hemoglobin A1c 6.4 (H) 06/16/2020 04:43 PM    Hemoglobin A1c (POC) 6.3 02/19/2020 11:45 AM         Review of Systems   Constitutional: Negative for malaise/fatigue. HENT: Negative for congestion. Eyes: Negative for blurred vision. Respiratory: Negative for cough and shortness of breath. Cardiovascular: Negative for chest pain, palpitations and leg swelling. Gastrointestinal: Negative for abdominal pain, constipation and heartburn. Genitourinary: Negative for dysuria, frequency and urgency. Musculoskeletal: Negative for back pain and joint pain. Neurological: Negative for dizziness, tingling and headaches. Endo/Heme/Allergies: Negative for environmental allergies. Psychiatric/Behavioral: Negative for depression. Physical Exam  Vitals signs and nursing note reviewed. Constitutional:       Appearance: Normal appearance. She is well-developed. Comments: /71 (BP 1 Location: Left arm, BP Patient Position: Sitting)   Pulse 77   Temp 96.9 °F (36.1 °C) (Temporal)   Resp 18   Ht 5' 2\" (1.575 m)   Wt 155 lb 6.4 oz (70.5 kg)   LMP 03/01/2013   SpO2 98%   BMI 28.42 kg/m²    HENT:      Right Ear: Tympanic membrane and ear canal normal.      Left Ear: Tympanic membrane and ear canal normal.      Nose: No mucosal edema or rhinorrhea. Neck:      Musculoskeletal: Normal range of motion and neck supple. Thyroid: No thyromegaly. Cardiovascular:      Rate and Rhythm: Normal rate and regular rhythm. Heart sounds: Normal heart sounds. No gallop. Pulmonary:      Effort: Pulmonary effort is normal.      Breath sounds: Normal breath sounds. Abdominal:      General: Bowel sounds are normal.      Palpations: Abdomen is soft. There is no mass. Tenderness: There is no abdominal tenderness. Musculoskeletal: Normal range of motion. General: No swelling. Lymphadenopathy:      Cervical: No cervical adenopathy. Skin:     General: Skin is warm and dry. Neurological:      General: No focal deficit present. Mental Status: She is alert and oriented to person, place, and time. Psychiatric:         Attention and Perception: Attention normal.         Mood and Affect: Mood normal. Mood is not anxious. Speech: Speech normal.         Behavior: Behavior normal.      Comments: Smiling and laughing. ASSESSMENT and PLAN  Diagnoses and all orders for this visit:    1. Essential hypertension  Stable     2. IGT (impaired glucose tolerance)  -     AMB POC HEMOGLOBIN A1C  -     AMB POC GLUCOSE, QUANTITATIVE, BLOOD    3. Mixed hyperlipidemia  -     LIPID PANEL    4. Adjustment reaction with anxiety and depression//  5. Primary insomnia  6. Grief reaction with prolonged bereavement  Continue with counseling to help her thru the holidays. 7. Encounter for long-term (current) use of medications  -     METABOLIC PANEL, COMPREHENSIVE      Follow-up and Dispositions    · Return in about 3 months (around 1/20/2021).        reviewed diet, exercise and weight control  cardiovascular risk and specific lipid/LDL goals reviewed  reviewed medications and side effects in detail  specific diabetic recommendations: low cholesterol diet, weight control and daily exercise discussed and glycohemoglobin and other lab monitoring discussed     I have discussed diagnosis listed in this note with pt and/or family. I have discussed treatment plans and options and the risk/benefit analysis of those options, including safe use of medications and possible medication side effects. Through the use of shared decision making we have agreed to the above plan. The patient has received an after-visit summary and questions were answered concerning future plans and follow up. Advise pt of any urgent changes then to proceed to the ER.

## 2020-10-20 NOTE — PROGRESS NOTES
Chief Complaint   Patient presents with    Hypertension     follow up    Anxiety     follow up    Blood sugar problem     follow up    Other     patient c/o knot on right wrist and noticed after falling about 2 months ago         Mammogram 2/14/2019. Patient is aware she is due and has an appt coming up. Colonoscopy 6/19/2013 by Dr. Ang Pastrana repeat in 10 years. 1. Have you been to the ER, urgent care clinic since your last visit? Hospitalized since your last visit? No    2. Have you seen or consulted any other health care providers outside of the [address] [place] [address] since your last visit? Include any pap smears or colon screening.  No

## 2020-10-21 LAB
ALBUMIN SERPL-MCNC: 4.9 G/DL (ref 3.8–4.9)
ALBUMIN/GLOB SERPL: 1.8 {RATIO} (ref 1.2–2.2)
ALP SERPL-CCNC: 88 IU/L (ref 39–117)
ALT SERPL-CCNC: 18 IU/L (ref 0–32)
AST SERPL-CCNC: 16 IU/L (ref 0–40)
BILIRUB SERPL-MCNC: 0.3 MG/DL (ref 0–1.2)
BUN SERPL-MCNC: 13 MG/DL (ref 6–24)
BUN/CREAT SERPL: 14 (ref 9–23)
CALCIUM SERPL-MCNC: 10.3 MG/DL (ref 8.7–10.2)
CHLORIDE SERPL-SCNC: 98 MMOL/L (ref 96–106)
CHOLEST SERPL-MCNC: 182 MG/DL (ref 100–199)
CO2 SERPL-SCNC: 26 MMOL/L (ref 20–29)
CREAT SERPL-MCNC: 0.91 MG/DL (ref 0.57–1)
GLOBULIN SER CALC-MCNC: 2.8 G/DL (ref 1.5–4.5)
GLUCOSE SERPL-MCNC: 120 MG/DL (ref 65–99)
HDLC SERPL-MCNC: 65 MG/DL
INTERPRETATION, 910389: NORMAL
LDLC SERPL CALC-MCNC: 98 MG/DL (ref 0–99)
POTASSIUM SERPL-SCNC: 4.4 MMOL/L (ref 3.5–5.2)
PROT SERPL-MCNC: 7.7 G/DL (ref 6–8.5)
SODIUM SERPL-SCNC: 138 MMOL/L (ref 134–144)
TRIGL SERPL-MCNC: 107 MG/DL (ref 0–149)
VLDLC SERPL CALC-MCNC: 19 MG/DL (ref 5–40)

## 2020-10-26 DIAGNOSIS — F43.81 GRIEF REACTION WITH PROLONGED BEREAVEMENT: ICD-10-CM

## 2020-10-26 RX ORDER — ESCITALOPRAM OXALATE 10 MG/1
TABLET ORAL
Qty: 180 TAB | Refills: 3 | Status: SHIPPED | OUTPATIENT
Start: 2020-10-26 | End: 2021-11-12

## 2020-10-26 NOTE — TELEPHONE ENCOUNTER
Last visit:10/20/20  Next visit:1/19/21  Previous refill 6/2/20(60+6R)    Patient requesting 90 day supply    Requested Prescriptions     Pending Prescriptions Disp Refills    escitalopram oxalate (Lexapro) 10 mg tablet 180 Tab 3     Sig: Take 2 tablets by mouth daily

## 2020-11-16 DIAGNOSIS — Z12.31 ENCOUNTER FOR SCREENING MAMMOGRAM FOR BREAST CANCER: ICD-10-CM

## 2021-02-22 RX ORDER — OLMESARTAN MEDOXOMIL AND HYDROCHLOROTHIAZIDE 40/25 40; 25 MG/1; MG/1
TABLET ORAL
Qty: 90 TAB | Refills: 2 | Status: SHIPPED | OUTPATIENT
Start: 2021-02-22 | End: 2021-11-12

## 2021-03-05 DIAGNOSIS — I10 ESSENTIAL HYPERTENSION: ICD-10-CM

## 2021-03-05 RX ORDER — AMLODIPINE BESYLATE 5 MG/1
TABLET ORAL
Qty: 90 TAB | Refills: 1 | Status: SHIPPED | OUTPATIENT
Start: 2021-03-05 | End: 2022-01-10

## 2021-03-11 RX ORDER — IBUPROFEN 800 MG/1
TABLET ORAL
Qty: 90 TAB | Refills: 1 | Status: SHIPPED | OUTPATIENT
Start: 2021-03-11

## 2021-03-11 NOTE — TELEPHONE ENCOUNTER
Last visit:10/20/20  Next visit:not scheduled  Previous refill 1/3/20(90+1R)    Requested Prescriptions     Pending Prescriptions Disp Refills    ibuprofen (MOTRIN) 800 mg tablet 90 Tab 1     Sig: Take 1 tablet by mouth every 8 hours as needed for pain

## 2021-08-11 DIAGNOSIS — K21.9 GASTROESOPHAGEAL REFLUX DISEASE WITHOUT ESOPHAGITIS: ICD-10-CM

## 2021-08-11 RX ORDER — OMEPRAZOLE 40 MG/1
CAPSULE, DELAYED RELEASE ORAL
Qty: 90 CAPSULE | Refills: 3 | Status: SHIPPED | OUTPATIENT
Start: 2021-08-11 | End: 2022-10-11

## 2021-08-23 ENCOUNTER — OFFICE VISIT (OUTPATIENT)
Dept: FAMILY MEDICINE CLINIC | Age: 61
End: 2021-08-23
Payer: COMMERCIAL

## 2021-08-23 VITALS
TEMPERATURE: 98 F | HEIGHT: 62 IN | BODY MASS INDEX: 30.66 KG/M2 | OXYGEN SATURATION: 98 % | SYSTOLIC BLOOD PRESSURE: 136 MMHG | HEART RATE: 75 BPM | DIASTOLIC BLOOD PRESSURE: 65 MMHG | WEIGHT: 166.6 LBS | RESPIRATION RATE: 16 BRPM

## 2021-08-23 DIAGNOSIS — E78.2 MIXED HYPERLIPIDEMIA: ICD-10-CM

## 2021-08-23 DIAGNOSIS — R73.02 IGT (IMPAIRED GLUCOSE TOLERANCE): ICD-10-CM

## 2021-08-23 DIAGNOSIS — F51.01 PRIMARY INSOMNIA: ICD-10-CM

## 2021-08-23 DIAGNOSIS — F43.23 ADJUSTMENT REACTION WITH ANXIETY AND DEPRESSION: ICD-10-CM

## 2021-08-23 DIAGNOSIS — I10 ESSENTIAL HYPERTENSION: Primary | ICD-10-CM

## 2021-08-23 DIAGNOSIS — Z79.899 ENCOUNTER FOR LONG-TERM (CURRENT) USE OF MEDICATIONS: ICD-10-CM

## 2021-08-23 LAB
ALBUMIN SERPL-MCNC: 4.4 G/DL (ref 3.5–5)
ALBUMIN/GLOB SERPL: 1.3 {RATIO} (ref 1.1–2.2)
ALP SERPL-CCNC: 101 U/L (ref 45–117)
ALT SERPL-CCNC: 45 U/L (ref 12–78)
ANION GAP SERPL CALC-SCNC: 4 MMOL/L (ref 5–15)
AST SERPL-CCNC: 16 U/L (ref 15–37)
BILIRUB SERPL-MCNC: 0.2 MG/DL (ref 0.2–1)
BUN SERPL-MCNC: 13 MG/DL (ref 6–20)
BUN/CREAT SERPL: 18 (ref 12–20)
CALCIUM SERPL-MCNC: 9.9 MG/DL (ref 8.5–10.1)
CHLORIDE SERPL-SCNC: 101 MMOL/L (ref 97–108)
CHOLEST SERPL-MCNC: 184 MG/DL
CO2 SERPL-SCNC: 32 MMOL/L (ref 21–32)
CREAT SERPL-MCNC: 0.72 MG/DL (ref 0.55–1.02)
ERYTHROCYTE [DISTWIDTH] IN BLOOD BY AUTOMATED COUNT: 13.3 % (ref 11.5–14.5)
EST. AVERAGE GLUCOSE BLD GHB EST-MCNC: 143 MG/DL
GLOBULIN SER CALC-MCNC: 3.3 G/DL (ref 2–4)
GLUCOSE SERPL-MCNC: 125 MG/DL (ref 65–100)
HBA1C MFR BLD HPLC: 6.3 %
HBA1C MFR BLD: 6.6 % (ref 4–5.6)
HCT VFR BLD AUTO: 36.9 % (ref 35–47)
HDLC SERPL-MCNC: 62 MG/DL
HDLC SERPL: 3 {RATIO} (ref 0–5)
HGB BLD-MCNC: 12.3 G/DL (ref 11.5–16)
LDLC SERPL CALC-MCNC: 101.8 MG/DL (ref 0–100)
MCH RBC QN AUTO: 31.6 PG (ref 26–34)
MCHC RBC AUTO-ENTMCNC: 33.3 G/DL (ref 30–36.5)
MCV RBC AUTO: 94.9 FL (ref 80–99)
NRBC # BLD: 0 K/UL (ref 0–0.01)
NRBC BLD-RTO: 0 PER 100 WBC
PLATELET # BLD AUTO: 284 K/UL (ref 150–400)
PMV BLD AUTO: 10.2 FL (ref 8.9–12.9)
POTASSIUM SERPL-SCNC: 3.6 MMOL/L (ref 3.5–5.1)
PROT SERPL-MCNC: 7.7 G/DL (ref 6.4–8.2)
RBC # BLD AUTO: 3.89 M/UL (ref 3.8–5.2)
SODIUM SERPL-SCNC: 137 MMOL/L (ref 136–145)
TRIGL SERPL-MCNC: 101 MG/DL (ref ?–150)
VLDLC SERPL CALC-MCNC: 20.2 MG/DL
WBC # BLD AUTO: 4.6 K/UL (ref 3.6–11)

## 2021-08-23 PROCEDURE — 99213 OFFICE O/P EST LOW 20 MIN: CPT | Performed by: FAMILY MEDICINE

## 2021-08-23 PROCEDURE — 83036 HEMOGLOBIN GLYCOSYLATED A1C: CPT | Performed by: FAMILY MEDICINE

## 2021-08-23 NOTE — PROGRESS NOTES
Chief Complaint   Patient presents with    Hypertension     follow up        1. Have you been to the ER, urgent care clinic since your last visit? Hospitalized since your last visit? No    2. Have you seen or consulted any other health care providers outside of the [address] [place] [address] since your last visit? Include any pap smears or colon screening. No    Discuss R hip ache - has been going on for months. Pt has been taking ibuprofen 800mg as needed with some relief.  Pt is not interested in other tx, just wants to discuss today

## 2021-08-23 NOTE — PROGRESS NOTES
HISTORY OF PRESENT ILLNESS  Helen Rod is a 62 y.o. female. HPI   For follow up on depression and anxiety. Overall she is good for the last several months. Mood is improved. She is feeling less stress. Having more days that she is feeling \"happy\" and feeling back in control. Still has some moments of anxiety that will come on but overall she is able to do meditation to help calm herself. Sleeping better on most nights. HTN follow up:  Compliant w/ meds, low salt diet, and exercise. No home bp monitoring. No swelling, headache or dizziness. No chest pain, SOB, palpitations. Otherwise feeling well since the last visit. Glucose intolerance reveiw:  She has IGT. Last a1c level was 6.0%. She has been trying to eat healthier. Has been trying to get in more exercise. Weight is up by 11 pounds. Diabetic ROS - further diabetic ROS: no polyuria or polydipsia, no chest pain, dyspnea or TIA's, no numbness, tingling or pain in extremities, no unusual visual symptoms.    Lab review: orders written for new lab studies as appropriate; see orders      Patient Active Problem List   Diagnosis Code    Anemia NEC     Insomnia G47.00    Encounter for medication monitoring Z51.81    Environmental allergies Z91.09    IGT (impaired glucose tolerance) R73.02    Essential hypertension I10    Mild depression (HCC) F32.0       Current Outpatient Medications   Medication Sig Dispense Refill    omeprazole (PRILOSEC) 40 mg capsule TAKE 1 CAPSULE BY MOUTH EVERY DAY 90 Capsule 3    ibuprofen (MOTRIN) 800 mg tablet Take 1 tablet by mouth every 8 hours as needed for pain 90 Tab 1    amLODIPine (NORVASC) 5 mg tablet TAKE 1 TABLET BY MOUTH EVERY DAY IN THE MORNING 90 Tab 1    olmesartan-hydroCHLOROthiazide (BENICAR HCT) 40-25 mg per tablet TAKE 1 TABLET BY MOUTH EVERY DAY 90 Tab 2    escitalopram oxalate (Lexapro) 10 mg tablet Take 2 tablets by mouth daily (Patient taking differently: Take 1.5 tablets by mouth daily) 180 Tab 3    traZODone (DESYREL) 50 mg tablet TAKE 1/2-1 TABLET BY MOUTH NIGHTLY AS NEEDED FOR SLEEP 30 Tab 3    atorvastatin (LIPITOR) 10 mg tablet TAKE 1 TABLET BY MOUTH EVERY DAY 90 Tab 3    aspirin 81 mg tablet Take 81 mg by mouth daily.          No Known Allergies    Past Medical History:   Diagnosis Date    Anemia NEC     Glucose intolerance     HTN (hypertension) 3/26/2010    Insomnia        Past Surgical History:   Procedure Laterality Date    ENDOSCOPY, COLON, DIAGNOSTIC      HX GYN  2006    fibroids removed from uterus    HX TUBAL LIGATION  1993       Family History   Problem Relation Age of Onset    Stroke Mother     Hypertension Sister     Stroke Brother     Hypertension Sister     No Known Problems Sister     No Known Problems Sister     No Known Problems Sister     Stroke Brother     Diabetes Maternal Uncle        Social History     Tobacco Use    Smoking status: Never Smoker    Smokeless tobacco: Never Used   Substance Use Topics    Alcohol use: Yes     Comment: occas        Lab Results   Component Value Date/Time    WBC 4.1 02/19/2020 11:41 AM    HGB 12.7 02/19/2020 11:41 AM    HCT 36.7 02/19/2020 11:41 AM    PLATELET 402 65/57/7751 11:41 AM    MCV 92 02/19/2020 11:41 AM     Lab Results   Component Value Date/Time    Cholesterol, total 182 10/20/2020 10:55 AM    HDL Cholesterol 65 10/20/2020 10:55 AM    LDL, calculated 98 10/20/2020 10:55 AM    LDL, calculated 93 02/19/2020 11:41 AM    Triglyceride 107 10/20/2020 10:55 AM    CHOL/HDL Ratio 2.6 05/04/2010 12:30 PM     Lab Results   Component Value Date/Time    TSH 1.820 04/28/2015 04:05 PM    T4, Free 1.15 04/28/2015 04:05 PM      Lab Results   Component Value Date/Time    Sodium 138 10/20/2020 10:55 AM    Potassium 4.4 10/20/2020 10:55 AM    Chloride 98 10/20/2020 10:55 AM    CO2 26 10/20/2020 10:55 AM    Anion gap 10 05/04/2010 12:30 PM    Glucose 120 (H) 10/20/2020 10:55 AM    BUN 13 10/20/2020 10:55 AM Creatinine 0.91 10/20/2020 10:55 AM    BUN/Creatinine ratio 14 10/20/2020 10:55 AM    GFR est AA 80 10/20/2020 10:55 AM    GFR est non-AA 69 10/20/2020 10:55 AM    Calcium 10.3 (H) 10/20/2020 10:55 AM    Bilirubin, total 0.3 10/20/2020 10:55 AM    ALT (SGPT) 18 10/20/2020 10:55 AM    Alk. phosphatase 88 10/20/2020 10:55 AM    Protein, total 7.7 10/20/2020 10:55 AM    Albumin 4.9 10/20/2020 10:55 AM    Globulin 4.0 01/04/2010 12:44 PM    A-G Ratio 1.8 10/20/2020 10:55 AM      Lab Results   Component Value Date/Time    Hemoglobin A1c 6.4 (H) 06/16/2020 04:43 PM    Hemoglobin A1c (POC) 6.1 10/20/2020 10:55 AM         Review of Systems   Constitutional: Negative for malaise/fatigue. HENT: Negative for congestion. Eyes: Negative for blurred vision. Respiratory: Negative for cough and shortness of breath. Cardiovascular: Negative for chest pain, palpitations and leg swelling. Gastrointestinal: Negative for abdominal pain, constipation and heartburn. Genitourinary: Negative for dysuria, frequency and urgency. Musculoskeletal: Negative for back pain and joint pain. Neurological: Negative for dizziness, tingling and headaches. Endo/Heme/Allergies: Negative for environmental allergies. Psychiatric/Behavioral: Negative for depression. The patient does not have insomnia. Physical Exam  Vitals and nursing note reviewed. Constitutional:       Appearance: Normal appearance. She is well-developed. Comments: /65   Pulse 75   Temp 98 °F (36.7 °C) (Oral)   Resp 16   Ht 5' 2\" (1.575 m)   Wt 166 lb 9.6 oz (75.6 kg)   LMP 03/01/2013   SpO2 98%   BMI 30.47 kg/m²    HENT:      Right Ear: Tympanic membrane and ear canal normal.      Left Ear: Tympanic membrane and ear canal normal.      Nose: No mucosal edema. Neck:      Thyroid: No thyromegaly. Cardiovascular:      Rate and Rhythm: Normal rate and regular rhythm. Heart sounds: Normal heart sounds. No gallop.     Pulmonary: Effort: Pulmonary effort is normal.      Breath sounds: Normal breath sounds. Abdominal:      General: Bowel sounds are normal.      Palpations: Abdomen is soft. There is no mass. Tenderness: There is no abdominal tenderness. Musculoskeletal:         General: Normal range of motion. Cervical back: Normal range of motion and neck supple. Right lower leg: No edema. Left lower leg: No edema. Lymphadenopathy:      Cervical: No cervical adenopathy. Skin:     General: Skin is warm and dry. Neurological:      General: No focal deficit present. Mental Status: She is alert and oriented to person, place, and time. Psychiatric:         Mood and Affect: Mood normal.         ASSESSMENT and PLAN  Diagnoses and all orders for this visit:    1. Essential hypertension  Discussed sodium restriction, high k rich diet, maintaining ideal body weight and regular exercise program such as daily walking 30 min perday 4-5 times per week, as physiologic means to achieve blood pressure control. Medication compliance advised. 2. IGT (impaired glucose tolerance)  Continue to monitor. Work on diet and exercise. a1c is up to 6.3%. She want to get back on track with her diet and exercise and does not want to yet start on medication as recommended today. -     AMB POC HEMOGLOBIN A1C    3. Mixed hyperlipidemia  Continue to monitor. Work on diet and exercise.  -     LIPID PANEL; Future    4. Adjustment reaction with anxiety and depression  Stable     5. Primary insomnia  Stable     6. Encounter for long-term (current) use of medications  -     METABOLIC PANEL, COMPREHENSIVE; Future  -     CBC W/O DIFF; Future      Follow-up and Dispositions    · Return in about 3 months (around 11/23/2021).        reviewed diet, exercise and weight control  cardiovascular risk and specific lipid/LDL goals reviewed  reviewed medications and side effects in detail  specific diabetic recommendations: low cholesterol diet, weight control and daily exercise discussed and glycohemoglobin and other lab monitoring discussed     I have discussed diagnosis listed in this note with pt and/or family. I have discussed treatment plans and options and the risk/benefit analysis of those options, including safe use of medications and possible medication side effects. Through the use of shared decision making we have agreed to the above plan. The patient has received an after-visit summary and questions were answered concerning future plans and follow up. Advise pt of any urgent changes then to proceed to the ER.

## 2021-08-26 ENCOUNTER — TELEPHONE (OUTPATIENT)
Dept: FAMILY MEDICINE CLINIC | Age: 61
End: 2021-08-26

## 2021-08-26 NOTE — TELEPHONE ENCOUNTER
Called. A1c sent out was at 6.6%. (poc a1c was 6.3%) She still want to work on the diet and exercise for the next 3 months prior adding medication.

## 2021-11-12 DIAGNOSIS — F43.81 GRIEF REACTION WITH PROLONGED BEREAVEMENT: ICD-10-CM

## 2021-11-12 RX ORDER — OLMESARTAN MEDOXOMIL AND HYDROCHLOROTHIAZIDE 40/25 40; 25 MG/1; MG/1
TABLET ORAL
Qty: 90 TABLET | Refills: 2 | Status: SHIPPED | OUTPATIENT
Start: 2021-11-12 | End: 2022-10-31

## 2021-11-12 RX ORDER — ESCITALOPRAM OXALATE 10 MG/1
TABLET ORAL
Qty: 180 TABLET | Refills: 3 | Status: SHIPPED | OUTPATIENT
Start: 2021-11-12 | End: 2021-11-24 | Stop reason: DRUGHIGH

## 2021-11-24 ENCOUNTER — OFFICE VISIT (OUTPATIENT)
Dept: FAMILY MEDICINE CLINIC | Age: 61
End: 2021-11-24
Payer: COMMERCIAL

## 2021-11-24 VITALS
RESPIRATION RATE: 16 BRPM | WEIGHT: 160.2 LBS | OXYGEN SATURATION: 97 % | DIASTOLIC BLOOD PRESSURE: 83 MMHG | HEART RATE: 86 BPM | TEMPERATURE: 97.8 F | SYSTOLIC BLOOD PRESSURE: 134 MMHG | HEIGHT: 62 IN | BODY MASS INDEX: 29.48 KG/M2

## 2021-11-24 DIAGNOSIS — Z79.899 ENCOUNTER FOR LONG-TERM (CURRENT) USE OF MEDICATIONS: ICD-10-CM

## 2021-11-24 DIAGNOSIS — Z23 NEEDS FLU SHOT: ICD-10-CM

## 2021-11-24 DIAGNOSIS — F43.23 ADJUSTMENT REACTION WITH ANXIETY AND DEPRESSION: ICD-10-CM

## 2021-11-24 DIAGNOSIS — R73.02 IGT (IMPAIRED GLUCOSE TOLERANCE): ICD-10-CM

## 2021-11-24 DIAGNOSIS — Z72.0 TOBACCO USE: ICD-10-CM

## 2021-11-24 DIAGNOSIS — E78.2 MIXED HYPERLIPIDEMIA: ICD-10-CM

## 2021-11-24 DIAGNOSIS — I10 ESSENTIAL HYPERTENSION: Primary | ICD-10-CM

## 2021-11-24 DIAGNOSIS — Z12.31 ENCOUNTER FOR SCREENING MAMMOGRAM FOR BREAST CANCER: ICD-10-CM

## 2021-11-24 LAB
GLUCOSE POC: 137 MG/DL
HBA1C MFR BLD HPLC: 6.3 %

## 2021-11-24 PROCEDURE — 82947 ASSAY GLUCOSE BLOOD QUANT: CPT | Performed by: FAMILY MEDICINE

## 2021-11-24 PROCEDURE — 90686 IIV4 VACC NO PRSV 0.5 ML IM: CPT | Performed by: FAMILY MEDICINE

## 2021-11-24 PROCEDURE — 83036 HEMOGLOBIN GLYCOSYLATED A1C: CPT | Performed by: FAMILY MEDICINE

## 2021-11-24 PROCEDURE — 99213 OFFICE O/P EST LOW 20 MIN: CPT | Performed by: FAMILY MEDICINE

## 2021-11-24 PROCEDURE — 90471 IMMUNIZATION ADMIN: CPT | Performed by: FAMILY MEDICINE

## 2021-11-24 RX ORDER — ESCITALOPRAM OXALATE 10 MG/1
TABLET ORAL
COMMUNITY
End: 2022-04-06 | Stop reason: SDUPTHER

## 2021-11-24 NOTE — PROGRESS NOTES
Chief Complaint   Patient presents with    Hypertension     follow up         Mammogram 10/28/2020    Colonoscopy 6/19/2013 by Dr. Paulina Ramirez repeat in 10 years. Verbal order received per Dr. Agustin Handler- flu vaccine IM- VORB    Pt received flu vaccine IM in right deltoid without any difficulty. 1. Have you been to the ER, urgent care clinic since your last visit? Hospitalized since your last visit? No    2. Have you seen or consulted any other health care providers outside of the [address] [place] [address] since your last visit? Include any pap smears or colon screening.  No

## 2021-11-24 NOTE — PROGRESS NOTES
HISTORY OF PRESENT ILLNESS  Michelle Hwang is a 62 y.o. female. Follow up on chronic medical problems. HTN follow up:  Compliant w/ meds, low salt diet, and exercise. No home bp monitoring. No swelling, headache or dizziness. No chest pain, SOB, palpitations. Otherwise feeling well since the last visit. Glucose intolerance reveiw:  She has IGT. Last a1c level was 6.3%. She did not want to start on medication. She has been trying to eat healthier. Has been trying to get in more exercise. Weight is down by 6 pounds. Diabetic ROS - further diabetic ROS: no polyuria or polydipsia, no chest pain, dyspnea or TIA's, no numbness, tingling or pain in extremities, no unusual visual symptoms. Lab review: orders written for new lab studies as appropriate; see orders    Depression Review:  Patient is seen for followup of depression. Treatment includes Lexapro. Overall she has been good for the last several months. Mood is improved. She is feeling less stress. Having more days that she is feeling \"happy\" and feeling back in control. Still has some moments of anxiety that will come on but overall she is able to do meditation to help calm herself. Sleeping better on most nights. Ongoing symptoms include occasional fatigue. She denies depressed mood, insomnia and hopelessness. She experiences the following side effects from the treatment: none.   Patient Active Problem List   Diagnosis Code    Anemia NEC     Insomnia G47.00    Encounter for medication monitoring Z51.81    Environmental allergies Z91.09    IGT (impaired glucose tolerance) R73.02    Essential hypertension I10    Mild depression (HCC) F32.0       Current Outpatient Medications   Medication Sig Dispense Refill    escitalopram oxalate (Lexapro) 10 mg tablet Take 1 1/2 tabs by mouth daily      olmesartan-hydroCHLOROthiazide (BENICAR HCT) 40-25 mg per tablet TAKE 1 TABLET BY MOUTH EVERY DAY 90 Tablet 2    omeprazole (PRILOSEC) 40 mg capsule TAKE 1 CAPSULE BY MOUTH EVERY DAY 90 Capsule 3    ibuprofen (MOTRIN) 800 mg tablet Take 1 tablet by mouth every 8 hours as needed for pain 90 Tab 1    amLODIPine (NORVASC) 5 mg tablet TAKE 1 TABLET BY MOUTH EVERY DAY IN THE MORNING 90 Tab 1    traZODone (DESYREL) 50 mg tablet TAKE 1/2-1 TABLET BY MOUTH NIGHTLY AS NEEDED FOR SLEEP 30 Tab 3    atorvastatin (LIPITOR) 10 mg tablet TAKE 1 TABLET BY MOUTH EVERY DAY 90 Tab 3    aspirin 81 mg tablet Take 81 mg by mouth daily.          No Known Allergies      Past Medical History:   Diagnosis Date    Anemia NEC     Glucose intolerance     HTN (hypertension) 3/26/2010    Insomnia          Past Surgical History:   Procedure Laterality Date    ENDOSCOPY, COLON, DIAGNOSTIC      HX GYN  2006    fibroids removed from uterus    HX TUBAL LIGATION  1993         Family History   Problem Relation Age of Onset    Stroke Mother     Hypertension Sister     Stroke Brother     Hypertension Sister     No Known Problems Sister     No Known Problems Sister     No Known Problems Sister     Stroke Brother     Diabetes Maternal Uncle        Social History     Tobacco Use    Smoking status: Current Some Day Smoker    Smokeless tobacco: Never Used   Substance Use Topics    Alcohol use: Yes     Comment: occas        Lab Results   Component Value Date/Time    WBC 4.6 08/23/2021 12:01 PM    HGB 12.3 08/23/2021 12:01 PM    HCT 36.9 08/23/2021 12:01 PM    PLATELET 324 38/89/3479 12:01 PM    MCV 94.9 08/23/2021 12:01 PM     Lab Results   Component Value Date/Time    Cholesterol, total 184 08/23/2021 12:01 PM    HDL Cholesterol 62 08/23/2021 12:01 PM    LDL, calculated 101.8 (H) 08/23/2021 12:01 PM    Triglyceride 101 08/23/2021 12:01 PM    CHOL/HDL Ratio 3.0 08/23/2021 12:01 PM     Lab Results   Component Value Date/Time    TSH 1.820 04/28/2015 04:05 PM    T4, Free 1.15 04/28/2015 04:05 PM      Lab Results   Component Value Date/Time    Sodium 137 08/23/2021 12:01 PM    Potassium 3.6 08/23/2021 12:01 PM    Chloride 101 08/23/2021 12:01 PM    CO2 32 08/23/2021 12:01 PM    Anion gap 4 (L) 08/23/2021 12:01 PM    Glucose 125 (H) 08/23/2021 12:01 PM    BUN 13 08/23/2021 12:01 PM    Creatinine 0.72 08/23/2021 12:01 PM    BUN/Creatinine ratio 18 08/23/2021 12:01 PM    GFR est AA >60 08/23/2021 12:01 PM    GFR est non-AA >60 08/23/2021 12:01 PM    Calcium 9.9 08/23/2021 12:01 PM    Bilirubin, total 0.2 08/23/2021 12:01 PM    ALT (SGPT) 45 08/23/2021 12:01 PM    Alk. phosphatase 101 08/23/2021 12:01 PM    Protein, total 7.7 08/23/2021 12:01 PM    Albumin 4.4 08/23/2021 12:01 PM    Globulin 3.3 08/23/2021 12:01 PM    A-G Ratio 1.3 08/23/2021 12:01 PM      Lab Results   Component Value Date/Time    Hemoglobin A1c 6.6 (H) 08/23/2021 12:01 PM    Hemoglobin A1c (POC) 6.3 11/24/2021 12:56 PM         Review of Systems   Constitutional: Negative for malaise/fatigue. HENT: Negative for congestion. Eyes: Negative for blurred vision. Respiratory: Negative for cough and shortness of breath. Cardiovascular: Negative for chest pain, palpitations and leg swelling. Gastrointestinal: Negative for abdominal pain, constipation and heartburn. Genitourinary: Negative for dysuria, frequency and urgency. Musculoskeletal: Negative for back pain and joint pain. Neurological: Negative for dizziness, tingling and headaches. Endo/Heme/Allergies: Negative for environmental allergies. Psychiatric/Behavioral: Negative for depression. The patient does not have insomnia. Physical Exam  Vitals and nursing note reviewed. Constitutional:       Appearance: Normal appearance. She is well-developed.       Comments: /83 (BP 1 Location: Left arm, BP Patient Position: Sitting)   Pulse 86   Temp 97.8 °F (36.6 °C) (Oral)   Resp 16   Ht 5' 2\" (1.575 m)   Wt 160 lb 3.2 oz (72.7 kg)   LMP 03/01/2013   SpO2 97%   BMI 29.30 kg/m²      HENT:      Right Ear: Tympanic membrane and ear canal normal.      Left Ear: Tympanic membrane and ear canal normal.      Nose: No mucosal edema. Neck:      Thyroid: No thyromegaly. Cardiovascular:      Rate and Rhythm: Normal rate and regular rhythm. Heart sounds: Normal heart sounds. No gallop. Pulmonary:      Effort: Pulmonary effort is normal.      Breath sounds: Normal breath sounds. Abdominal:      General: Bowel sounds are normal.      Palpations: Abdomen is soft. There is no mass. Tenderness: There is no abdominal tenderness. Musculoskeletal:         General: Normal range of motion. Cervical back: Normal range of motion and neck supple. Right lower leg: No edema. Left lower leg: No edema. Lymphadenopathy:      Cervical: No cervical adenopathy. Skin:     General: Skin is warm and dry. Neurological:      General: No focal deficit present. Mental Status: She is alert and oriented to person, place, and time. Psychiatric:         Mood and Affect: Mood normal.         ASSESSMENT and PLAN  Diagnoses and all orders for this visit:    1. Essential hypertension  Discussed sodium restriction, high k rich diet, maintaining ideal body weight and regular exercise program such as daily walking 30 min perday 4-5 times per week, as physiologic means to achieve blood pressure control. Medication compliance advised. 2. IGT (impaired glucose tolerance)  a1c stable at 6.3%  -     AMB POC HEMOGLOBIN A1C  -     AMB POC GLUCOSE, QUANTITATIVE, BLOOD    3. Mixed hyperlipidemia  Stable on lipitor     4. Adjustment reaction with anxiety and depression  Stable on lexapro    5. Encounter for long-term (current) use of medications    6. Encounter for screening mammogram for breast cancer  -     [facility] 3D EMMA W MAMMO BI SCREENING INCL CAD; Future    7. Tobacco use  The patient was counseled on the dangers of tobacco use, and was advised to quit.          Follow-up and Dispositions    · Return in about 4 months (around 3/24/2022). reviewed diet, exercise and weight control  very strongly urged to quit smoking to reduce cardiovascular risk  cardiovascular risk and specific lipid/LDL goals reviewed  reviewed medications and side effects in detail  specific diabetic recommendations: low cholesterol diet, weight control and daily exercise discussed and glycohemoglobin and other lab monitoring discussed     I have discussed diagnosis listed in this note with pt and/or family. I have discussed treatment plans and options and the risk/benefit analysis of those options, including safe use of medications and possible medication side effects. Through the use of shared decision making we have agreed to the above plan. The patient has received an after-visit summary and questions were answered concerning future plans and follow up. Advise pt of any urgent changes then to proceed to the ER.

## 2022-01-10 DIAGNOSIS — I10 ESSENTIAL HYPERTENSION: ICD-10-CM

## 2022-01-10 RX ORDER — AMLODIPINE BESYLATE 5 MG/1
TABLET ORAL
Qty: 90 TABLET | Refills: 1 | Status: SHIPPED | OUTPATIENT
Start: 2022-01-10 | End: 2022-07-13

## 2022-01-12 DIAGNOSIS — Z12.31 ENCOUNTER FOR SCREENING MAMMOGRAM FOR BREAST CANCER: ICD-10-CM

## 2022-01-14 RX ORDER — ATORVASTATIN CALCIUM 10 MG/1
TABLET, FILM COATED ORAL
Qty: 90 TABLET | Refills: 3 | Status: SHIPPED | OUTPATIENT
Start: 2022-01-14

## 2022-02-14 ENCOUNTER — TELEPHONE (OUTPATIENT)
Dept: FAMILY MEDICINE CLINIC | Age: 62
End: 2022-02-14

## 2022-02-14 NOTE — TELEPHONE ENCOUNTER
Called patient and wanted name of sleep MD. Informed patient Dr. Maureen Perez [phone]. Patient stated she would call to schedule appt.

## 2022-02-14 NOTE — TELEPHONE ENCOUNTER
----- Message from [employer] sent at 2/14/2022 11:59 AM EST -----  Subject: Message to Provider    QUESTIONS  Information for Provider? pt would like to know the name of the doctor for   snoring, forgot to give to the pt the last time she was seen, pls call or   text her @ [phone]  ---------------------------------------------------------------------------  --------------  [address]  What is the best way for the office to contact you? OK to leave message on   voicemail  Preferred Call Back Phone Number? [phone]  ---------------------------------------------------------------------------  --------------  SCRIPT ANSWERS  Relationship to Patient?  Self

## 2022-02-28 ENCOUNTER — OFFICE VISIT (OUTPATIENT)
Dept: SLEEP MEDICINE | Age: 62
End: 2022-02-28
Payer: COMMERCIAL

## 2022-02-28 VITALS
SYSTOLIC BLOOD PRESSURE: 136 MMHG | DIASTOLIC BLOOD PRESSURE: 63 MMHG | BODY MASS INDEX: 29.44 KG/M2 | OXYGEN SATURATION: 99 % | HEART RATE: 82 BPM | WEIGHT: 160 LBS | HEIGHT: 62 IN | TEMPERATURE: 96.2 F

## 2022-02-28 DIAGNOSIS — G47.33 OSA (OBSTRUCTIVE SLEEP APNEA): Primary | ICD-10-CM

## 2022-02-28 DIAGNOSIS — I10 ESSENTIAL HYPERTENSION: ICD-10-CM

## 2022-02-28 PROCEDURE — 99204 OFFICE O/P NEW MOD 45 MIN: CPT | Performed by: INTERNAL MEDICINE

## 2022-02-28 NOTE — PATIENT INSTRUCTIONS
[address] [place]. [place], [address]  Tel.  [phone]  Fax. [address] [facility] [address]  [place], [address] [facility]  Tel.  [phone]  Fax. [phone] [address]  Tel.  [phone]  Fax. [phone]     Sleep Apnea: After Your Visit  Your Care Instructions  Sleep apnea occurs when you frequently stop breathing for 10 seconds or longer during sleep. It can be mild to severe, based on the number of times per hour that you stop breathing or have slowed breathing. Blocked or narrowed airways in your nose, mouth, or throat can cause sleep apnea. Your airway can become blocked when your throat muscles and tongue relax during sleep. Sleep apnea is common, occurring in 1 out of 20 individuals. Individuals having any of the following characteristics should be evaluated and treated right away due to high risk and detrimental consequences from untreated sleep apnea:  1. Obesity  2. Congestive Heart failure  3. Atrial Fibrillation  4. Uncontrolled Hypertension  5. Type II Diabetes  6. Night-time Arrhythmias  7. Stroke  8. Pulmonary Hypertension  9. High-risk Driving Populations (pilots, truck drivers, etc.)  10. Patients Considering Weight-loss Surgery    How do you know you have sleep apnea? You probably have sleep apnea if you answer 'yes' to 3 or more of the following questions:  S - Have you been told that you Snore? T - Are you often Tired during the day? O - Has anyone Observed you stop breathing while sleeping? P- Do you have (or are being treated for) high blood Pressure? B - Are you obese (Body Mass Index > 35)? A - Is your Age 48years old or older? N - Is your Neck size greater than 16 inches? G - Are you male Gender? A sleep physician can prescribe a breathing device that prevents tissues in the throat from blocking your airway.  Or your doctor may recommend using a dental device (oral breathing device) to help keep your airway open. In some cases, surgery may be needed to remove enlarged tissues in the throat. Follow-up care is a key part of your treatment and safety. Be sure to make and go to all appointments, and call your doctor if you are having problems. It's also a good idea to know your test results and keep a list of the medicines you take. How can you care for yourself at home? · Lose weight, if needed. It may reduce the number of times you stop breathing or have slowed breathing. · Go to bed at the same time every night. · Sleep on your side. It may stop mild apnea. If you tend to roll onto your back, sew a pocket in the back of your pajama top. Put a tennis ball into the pocket, and stitch the pocket shut. This will help keep you from sleeping on your back. · Avoid alcohol and medicines such as sleeping pills and sedatives before bed. · Do not smoke. Smoking can make sleep apnea worse. If you need help quitting, talk to your doctor about stop-smoking programs and medicines. These can increase your chances of quitting for good. · Prop up the head of your bed 4 to 6 inches by putting bricks under the legs of the bed. · Treat breathing problems, such as a stuffy nose, caused by a cold or allergies. · Use a continuous positive airway pressure (CPAP) breathing machine if lifestyle changes do not help your apnea and your doctor recommends it. The machine keeps your airway from closing when you sleep. · If CPAP does not help you, ask your doctor whether you should try other breathing machines. A bilevel positive airway pressure machine has two types of air pressureâone for breathing in and one for breathing out. Another device raises or lowers air pressure as needed while you breathe. · If your nose feels dry or bleeds when using one of these machines, talk with your doctor about increasing moisture in the air. A humidifier may help.   · If your nose is runny or stuffy from using a breathing machine, talk with your doctor about using decongestants or a corticosteroid nasal spray. When should you call for help? Watch closely for changes in your health, and be sure to contact your doctor if:  · You still have sleep apnea even though you have made lifestyle changes. · You are thinking of trying a device such as CPAP. · You are having problems using a CPAP or similar machine. Where can you learn more? Go to [employer]. Enter B938 in the search box to learn more about \"Sleep Apnea: After Your Visit. \"   © 2355-7475 Healthwise, Incorporated. Care instructions adapted under license by [facility] [employer] (which disclaims liability or warranty for this information). This care instruction is for use with your licensed healthcare professional. If you have questions about a medical condition or this instruction, always ask your healthcare professional. Oksana Ford any warranty or liability for your use of this information. PROPER SLEEP HYGIENE    What to avoid  · Do not have drinks with caffeine, such as coffee or black tea, for 8 hours before bed. · Do not smoke or use other types of tobacco near bedtime. Nicotine is a stimulant and can keep you awake. · Avoid drinking alcohol late in the evening, because it can cause you to wake in the middle of the night. · Do not eat a big meal close to bedtime. If you are hungry, eat a light snack. · Do not drink a lot of water close to bedtime, because the need to urinate may wake you up during the night. · Do not read or watch TV in bed. Use the bed only for sleeping and sexual activity. What to try  · Go to bed at the same time every night, and wake up at the same time every morning. Do not take naps during the day. · Keep your bedroom quiet, dark, and cool. · Get regular exercise, but not within 3 to 4 hours of your bedtime. .  · Sleep on a comfortable pillow and mattress.   · If watching the clock makes you anxious, turn it facing away from you so you cannot see the time. · If you worry when you lie down, start a worry book. Well before bedtime, write down your worries, and then set the book and your concerns aside. · Try meditation or other relaxation techniques before you go to bed. · If you cannot fall asleep, get up and go to another room until you feel sleepy. Do something relaxing. Repeat your bedtime routine before you go to bed again. · Make your house quiet and calm about an hour before bedtime. Turn down the lights, turn off the TV, log off the computer, and turn down the volume on music. This can help you relax after a busy day. Drowsy Driving  The [address] [place] [address] Road Traffic Safety Administration cites drowsiness as a causing factor in more than 131,245 police reported crashes annually, resulting in 76,000 injuries and 1,500 deaths. Other surveys suggest 55% of people polled have driven while drowsy in the past year, 23% had fallen asleep but not crashed, 3% crashed, and 2% had and accident due to drowsy driving. Who is at risk? Young Drivers: One study of drowsy driving accidents states that 55% of the drivers were under 25 years. Of those, 75% were male. Shift Workers and Travelers: People who work overnight or travel across time zones frequently are at higher risk of experiencing Circadian Rhythm Disorders. They are trying to work and function when their body is programed to sleep. Sleep Deprived: Lack of sleep has a serious impact on your ability to pay attention or focus on a task. Consistently getting less than the average of 8 hours your body needs creates partial or cumulative sleep deprivation. Untreated Sleep Disorders: Sleep Apnea, Narcolepsy, R.L.S., and other sleep disorders (untreated) prevent a person from getting enough restful sleep. This leads to excessive daytime sleepiness and increases the risk for drowsy driving accidents by up to 7 times.   Medications / Alcohol: Even over the counter medications can cause drowsiness. Medications that impair a drivers attention should have a warning label. Alcohol naturally makes you sleepy and on its own can cause accidents. Combined with excessive drowsiness its effects are amplified. Signs of Drowsy Driving:   * You don't remember driving the last few miles   * You may drift out of your deborah   * You are unable to focus and your thoughts wander   * You may yawn more often than normal   * You have difficulty keeping your eyes open / nodding off   * Missing traffic signs, speeding, or tailgating  Prevention-   Good sleep hygiene, lifestyle and behavioral choices have the most impact on drowsy driving. There is no substitute for sleep and the average person requires 8 hours nightly. If you find yourself driving drowsy, stop and sleep. Consider the sleep hygiene tips provided during your visit as well. Medication Refill Policy: Refills for all medications require 1 week advance notice. Please have your pharmacy fax a refill request. We are unable to fax, or call in \"controled substance\" medications and you will need to pick these prescriptions up from our office. [employer] Activation    Thank you for requesting access to [employer]. Please follow the instructions below to securely access and download your online medical record. [employer] allows you to send messages to your doctor, view your test results, renew your prescriptions, schedule appointments, and more. How Do I Sign Up? 1. In your internet browser, go to [email or web address]. 2. Click on the First Time User? Click Here link in the Sign In box. You will see the New Member Sign Up page. 3. Enter your [employer] Access Code exactly as it appears below. You will not need to use this code after youve completed the sign-up process. If you do not sign up before the expiration date, you must request a new code. [employer] Access Code:  Activation code not generated  Current [employer] Status: Active (This is the date your [employer] access code will )    4. Enter the last four digits of your Social Security Number (xxxx) and Date of Birth (mm/dd/yyyy) as indicated and click Submit. You will be taken to the next sign-up page. 5. Create a [employer] ID. This will be your Dispop login ID and cannot be changed, so think of one that is secure and easy to remember. 6. Create a [employer] password. You can change your password at any time. 7. Enter your Password Reset Question and Answer. This can be used at a later time if you forget your password. 8. Enter your e-mail address. You will receive e-mail notification when new information is available in [address] 9. Click Sign Up. You can now view and download portions of your medical record. 10. Click the Download Summary menu link to download a portable copy of your medical information. Additional Information    If you have questions, please call [phone]. Remember, [employer] is NOT to be used for urgent needs. For medical emergencies, dial 911.

## 2022-02-28 NOTE — PROGRESS NOTES
[facility] [place]. [place], [address]  Tel.  [phone]  Fax. [address] [facility] [address]  [place], [address] [facility]  Tel.  [phone]  Fax. [phone] [address]   Tel.  [phone]  Fax. [phone]       Babita Landers is a 62y.o. year old female referred by Dr. Sanjay Kunz  for evaluation of a sleep disorder. ASSESSMENT/PLAN:      ICD-10-CM ICD-9-CM    1. REYMUNDO (obstructive sleep apnea)  G47.33 327.23 SLEEP STUDY UNATTENDED, 4 CHANNEL   2. Essential hypertension  I10 401.9    3. BMI 29.0-29.9,adult  Z68.29 V85.25        Patient has a history and examination consistent with the diagnosis of sleep apnea. Follow-up and Dispositions    · Return for telephone follow-up after testing is completed. * The patient currently has a Low Risk for having sleep apnea. STOP-BANG score 3.    * Sleep testing was ordered for initial evaluation. Orders Placed This Encounter    SLEEP STUDY UNATTENDED, 4 CHANNEL     Order Specific Question:   Reason for Exam     Answer:   REYMUNDO       * She was provided information on sleep apnea including corresponding risk factors and the importance of proper treatment. * Treatment options were reviewed in detail. she would like to proceed with PAP therapy. Patient will be seen in follow-up in 6-8 weeks after PAP setup to gauge treatment response and adherence to therapy. * The patient was counseled regarding proper sleep hygiene, with emphasis on ensuring sufficient total sleep time; safe driving and the benefits of exercise and weight loss. * All of her questions were addressed. 2. Recommended a dedicated weight loss program through appropriate diet and exercise regimen as significant weight reduction has been shown to reduce severity of obstructive sleep apnea. SUBJECTIVE/OBJECTIVE:    Babita Landers is an 62 y.o. female referred for evaluation for a sleep disorder. She complains of snoring associated with awakening in the middle of the night because of urination. Symptoms began 10 years ago, unchanged since that time. She usually can fall asleep in 30-60 minutes. Family or house members note snoring. She denies of symptoms indicative of cataplexy, sleep paralysis or sleep related hallucinations. She denies of a history of unusual movements occurring during sleep. Adriano Gandhi does not wake up frequently at night. She is not bothered by waking up too early and left unable to get back to sleep. She actually sleeps about 7 hours at night and wakes up about 0 times during the night. She does work shifts:  First Shift. [place] indicates she   get too little sleep at night. Her bedtime is 2330. She awakens at 0830. She does not take naps. She takes   naps a week lasting  . She has the following observed behaviors: Loud snoring,Light snoring;  . Other remarks: The patient has not undergone diagnostic testing for the current problems. Review of Systems:  Constitutional:  No significant weight loss or weight gain  Eyes:  No blurred vision  CVS:  No significant chest pain  Pulm:  No significant shortness of breath  GI:  No significant nausea or vomiting  :  No significant nocturia  Musculoskeletal:  No significant joint pain at night  Skin:  No significant rashes  Neuro:  No significant dizziness   Psych:  No active mood issues    Sleep Review of Systems: notable for Positive difficulty falling asleep; Positive awakenings at night; Negative perceived regular dreaming; Negative nightmares; Negative  early morning headaches; Negative  memory problems; Negative  concentration issues; Negative caffeine;  Negative alcohol;   Negative history of any automobile or occupational accidents due to daytime drowsiness.       Edgerton Sleepiness Score: 1   and Modified F.O.S.Q. Score Total / 2: 20    Visit Vitals  /63   Pulse 82   Temp (!) 96.2 °F (35.7 °C)   Ht 5' 2\" (1.575 m)   Wt 160 lb (72.6 kg)   LMP 03/01/2013   SpO2 99%   BMI 29.26 kg/m²           General:   Alert, oriented, not in acute distress   Eyes:  Anicteric Sclerae; intact EOM's   Nose:  No obvious nasal septum deviation    Oropharynx:   Mallampati score 4, thick tongue base, uvula not seen due to low-lying soft palate, narrow tonsilo-pharyngeal pilars, tongue scalloped   Neck:   midline trachea,  no JVD   Chest/Lungs:  symmetrical lung expansion ,clear lung fields on auscultation    CVS:  Normal rate, regular rhythm    Extremities:  No obvious rashes, absent edema    Neuro:  No focal deficits; No obvious tremor    Psych:  Normal eye contact; normal  affect, normal countenance          Matthias Grullon MD, FAASM  Diplomate American Board of Sleep Medicine  Diplomate in Sleep Medicine - ABP    Electronically signed.  02/28/22

## 2022-03-17 ENCOUNTER — HOSPITAL ENCOUNTER (OUTPATIENT)
Dept: SLEEP MEDICINE | Age: 62
Discharge: HOME | End: 2022-03-17
Payer: COMMERCIAL

## 2022-03-17 PROCEDURE — 95806 SLEEP STUDY UNATT&RESP EFFT: CPT | Performed by: INTERNAL MEDICINE

## 2022-03-20 PROBLEM — F32.A MILD DEPRESSION: Status: ACTIVE | Noted: 2018-02-19

## 2022-03-22 ENCOUNTER — TELEPHONE (OUTPATIENT)
Dept: SLEEP MEDICINE | Age: 62
End: 2022-03-22

## 2022-03-22 DIAGNOSIS — G47.33 OSA (OBSTRUCTIVE SLEEP APNEA): Primary | ICD-10-CM

## 2022-03-22 NOTE — TELEPHONE ENCOUNTER
Pauline Sarmiento underwent Sleep Testing which was indicative of an average AHI of 9.7 per hour with an SpO2 jose of 78% and SpO2 of < 88% being 3.6 minutes. Treatment options include use of APAP (Automatically Adjusting Positive Airway Pressure) therapy OR Positional Therapy. An APAP prescription has been written and patient will be contacted by office staff regarding follow-up  in 2-3 months after initiation of therapy. The respiratory disturbance noted above occurred predominantly in supine position. An alternate approach would be to avoiding sleeping flat or on her back, this can be done by using pillows or other positioning devices to promote side sleeping. If the alternate therapy is chosen patient should use this in combination with weight loss (dietary modification and exercise) and return for follow-up in 1 year or sooner depending on patient's need. If patient has any further questions he should schedule an audio visit to discuss. Encounter Diagnosis   Name Primary?  REYMUNDO (obstructive sleep apnea) Yes       Orders Placed This Encounter    AMB SUPPLY ORDER     Diagnosis: Obstructive Sleep Apnea ICD-10 Code (G47.33)    Positive Airway Pressure Therapy: Duration of need: 99 months. APAP Device with Heated Humidifer [id number] / [id number]. Minimum Pressure: 06 cmH2O, Maximum Pressure: 20 cmH2O.     Nasal Pillows Combo Mask (Replace) 2 per month.  Nasal Pillows (Replace) 2 per month.  Full Face Mask 1 every 3 months.  Full Face Mask Cushion 1 per month.  Nasal Cushion (Replace) 2 per month.  Nasal Interface Mask 1 every 3 months.  Headgear 1 every 6 months.  Chinstrap 1 every 6 months.  Tubing 1 every 3 months.  Filter(s) Disposable 2 per month.  Filter(s) Non-Disposable 1 every 6 months. .   [address] for Humidifier (Replace) 1 every 6 months.       Perform Mask Fitting per patient preference and comfort - replace as above. Edilia Rosales MD, FAASM; NPI: [id number]  Electronically signed. 03/22/22

## 2022-03-25 ENCOUNTER — DOCUMENTATION ONLY (OUTPATIENT)
Dept: SLEEP MEDICINE | Age: 62
End: 2022-03-25

## 2022-03-25 ENCOUNTER — TELEPHONE (OUTPATIENT)
Dept: SLEEP MEDICINE | Age: 62
End: 2022-03-25

## 2022-03-25 NOTE — TELEPHONE ENCOUNTER
Patient called in wanting to talk to Dr. Liz Medina before ordering machine. She can be reached at [phone].

## 2022-03-25 NOTE — TELEPHONE ENCOUNTER
Patient called for a third time requesting a return call from [address] before ordering a PAP device. She can be reached at [phone].

## 2022-04-13 ENCOUNTER — OFFICE VISIT (OUTPATIENT)
Dept: FAMILY MEDICINE CLINIC | Age: 62
End: 2022-04-13
Payer: COMMERCIAL

## 2022-04-13 VITALS
HEIGHT: 62 IN | SYSTOLIC BLOOD PRESSURE: 123 MMHG | BODY MASS INDEX: 28.97 KG/M2 | RESPIRATION RATE: 16 BRPM | OXYGEN SATURATION: 98 % | TEMPERATURE: 97.6 F | DIASTOLIC BLOOD PRESSURE: 63 MMHG | WEIGHT: 157.4 LBS | HEART RATE: 72 BPM

## 2022-04-13 DIAGNOSIS — E78.2 MIXED HYPERLIPIDEMIA: ICD-10-CM

## 2022-04-13 DIAGNOSIS — Z79.899 ENCOUNTER FOR LONG-TERM (CURRENT) USE OF MEDICATIONS: ICD-10-CM

## 2022-04-13 DIAGNOSIS — I10 ESSENTIAL HYPERTENSION: Primary | ICD-10-CM

## 2022-04-13 DIAGNOSIS — F43.23 ADJUSTMENT REACTION WITH ANXIETY AND DEPRESSION: ICD-10-CM

## 2022-04-13 DIAGNOSIS — R73.02 IGT (IMPAIRED GLUCOSE TOLERANCE): ICD-10-CM

## 2022-04-13 LAB
ALBUMIN SERPL-MCNC: 4.2 G/DL (ref 3.5–5)
ALBUMIN/GLOB SERPL: 1.2 {RATIO} (ref 1.1–2.2)
ALP SERPL-CCNC: 80 U/L (ref 45–117)
ALT SERPL-CCNC: 31 U/L (ref 12–78)
ANION GAP SERPL CALC-SCNC: 8 MMOL/L (ref 5–15)
AST SERPL-CCNC: 16 U/L (ref 15–37)
BILIRUB SERPL-MCNC: 0.4 MG/DL (ref 0.2–1)
BUN SERPL-MCNC: 10 MG/DL (ref 6–20)
BUN/CREAT SERPL: 12 (ref 12–20)
CALCIUM SERPL-MCNC: 9.5 MG/DL (ref 8.5–10.1)
CHLORIDE SERPL-SCNC: 100 MMOL/L (ref 97–108)
CHOLEST SERPL-MCNC: 179 MG/DL
CO2 SERPL-SCNC: 29 MMOL/L (ref 21–32)
CREAT SERPL-MCNC: 0.82 MG/DL (ref 0.55–1.02)
GLOBULIN SER CALC-MCNC: 3.6 G/DL (ref 2–4)
GLUCOSE POC: 149 MG/DL
GLUCOSE SERPL-MCNC: 135 MG/DL (ref 65–100)
HBA1C MFR BLD HPLC: 6.2 %
HDLC SERPL-MCNC: 52 MG/DL
HDLC SERPL: 3.4 {RATIO} (ref 0–5)
LDLC SERPL CALC-MCNC: 102.4 MG/DL (ref 0–100)
POTASSIUM SERPL-SCNC: 3.4 MMOL/L (ref 3.5–5.1)
PROT SERPL-MCNC: 7.8 G/DL (ref 6.4–8.2)
SODIUM SERPL-SCNC: 137 MMOL/L (ref 136–145)
TRIGL SERPL-MCNC: 123 MG/DL (ref ?–150)
VLDLC SERPL CALC-MCNC: 24.6 MG/DL

## 2022-04-13 PROCEDURE — 99213 OFFICE O/P EST LOW 20 MIN: CPT | Performed by: FAMILY MEDICINE

## 2022-04-13 PROCEDURE — 83036 HEMOGLOBIN GLYCOSYLATED A1C: CPT | Performed by: FAMILY MEDICINE

## 2022-04-13 PROCEDURE — 82947 ASSAY GLUCOSE BLOOD QUANT: CPT | Performed by: FAMILY MEDICINE

## 2022-04-13 NOTE — PROGRESS NOTES
Chief Complaint   Patient presents with    Hypertension     follow up    Cholesterol Problem     follow up     Mammogram 12/17/21    Colonoscopy 06/19/13 10 yrs. Dr. Johnson Prom    A1C 11/24/21    1. \"Have you been to the ER, urgent care clinic since your last visit? Hospitalized since your last visit? \" No    2. \"Have you seen or consulted any other health care providers outside of the [address] [place] [address] since your last visit? \" No     3. For patients aged 39-70: Has the patient had a colonoscopy / FIT/ Cologuard? Yes - no Care Gap present      If the patient is female:    4. For patients aged 41-77: Has the patient had a mammogram within the past 2 years? Yes - no Care Gap present      5. For patients aged 21-65: Has the patient had a pap smear? No    There are no preventive care reminders to display for this patient.

## 2022-04-13 NOTE — PROGRESS NOTES
HISTORY OF PRESENT ILLNESS  Kyle Lock is a 62 y.o. female. HPI   Follow up on chronic medical problems. Overall feeling well. Sleep and mood has been good. HTN follow up:  Compliant w/ meds, low salt diet, and exercise. No home bp monitoring. No swelling, headache or dizziness. No chest pain, SOB, palpitations. Otherwise feeling well since the last visit. Glucose intolerance reveiw:  She has IGT. Last a1c level was 6.3%. She did not want to start on medication. She has been trying to eat healthier. Has been trying to get in more exercise. Weight is down by 6 pounds. Diabetic ROS - further diabetic ROS: no polyuria or polydipsia, no chest pain, dyspnea or TIA's, no numbness, tingling or pain in extremities, no unusual visual symptoms. Lab review: orders written for new lab studies as appropriate; see orders    Depression Review:  Patient is seen for followup of depression. Treatment includes Lexapro. Overall she has been good for the last several months. Mood is improved. She is feeling less stress. Having more days that she is feeling \"happy\" and feeling back in control. Still has some moments of anxiety that will come on but overall she is able to do meditation to help calm herself. Sleeping better on most nights. Ongoing symptoms include occasional fatigue. She denies depressed mood, insomnia and hopelessness. She experiences the following side effects from the treatment: none. Patient Active Problem List   Diagnosis Code    Anemia NEC     Insomnia G47.00    Encounter for medication monitoring Z51.81    Environmental allergies Z91.09    IGT (impaired glucose tolerance) R73.02    Essential hypertension I10    Mild depression F32. A       Current Outpatient Medications   Medication Sig Dispense Refill    escitalopram oxalate (Lexapro) 10 mg tablet Take 1 1/2 tabs by mouth daily 45 Tablet 5    atorvastatin (LIPITOR) 10 mg tablet TAKE 1 TABLET BY MOUTH EVERY DAY 90 Tablet 3    amLODIPine (NORVASC) 5 mg tablet TAKE 1 TABLET BY MOUTH EVERY DAY IN THE MORNING 90 Tablet 1    olmesartan-hydroCHLOROthiazide (BENICAR HCT) 40-25 mg per tablet TAKE 1 TABLET BY MOUTH EVERY DAY 90 Tablet 2    ibuprofen (MOTRIN) 800 mg tablet Take 1 tablet by mouth every 8 hours as needed for pain 90 Tab 1    aspirin 81 mg tablet Take 81 mg by mouth daily.       omeprazole (PRILOSEC) 40 mg capsule TAKE 1 CAPSULE BY MOUTH EVERY DAY 90 Capsule 3    traZODone (DESYREL) 50 mg tablet TAKE 1/2-1 TABLET BY MOUTH NIGHTLY AS NEEDED FOR SLEEP 30 Tab 3       No Known Allergies    Past Medical History:   Diagnosis Date    Anemia NEC     Glucose intolerance     HTN (hypertension) 3/26/2010    Insomnia        Past Surgical History:   Procedure Laterality Date    ENDOSCOPY, COLON, DIAGNOSTIC      HX GYN  2006    fibroids removed from uterus    HX TUBAL LIGATION  1993       Family History   Problem Relation Age of Onset    Stroke Mother     Hypertension Sister     Stroke Brother     Hypertension Sister     No Known Problems Sister     No Known Problems Sister     No Known Problems Sister     Stroke Brother     Diabetes Maternal Uncle        Social History     Tobacco Use    Smoking status: Current Some Day Smoker    Smokeless tobacco: Never Used   Substance Use Topics    Alcohol use: Yes     Comment: occas        Lab Results   Component Value Date/Time    WBC 4.6 08/23/2021 12:01 PM    HGB 12.3 08/23/2021 12:01 PM    HCT 36.9 08/23/2021 12:01 PM    PLATELET 591 09/14/1058 12:01 PM    MCV 94.9 08/23/2021 12:01 PM     Lab Results   Component Value Date/Time    Cholesterol, total 184 08/23/2021 12:01 PM    HDL Cholesterol 62 08/23/2021 12:01 PM    LDL, calculated 101.8 (H) 08/23/2021 12:01 PM    Triglyceride 101 08/23/2021 12:01 PM    CHOL/HDL Ratio 3.0 08/23/2021 12:01 PM     Lab Results   Component Value Date/Time    TSH 1.820 04/28/2015 04:05 PM    T4, Free 1.15 04/28/2015 04:05 PM      Lab Results   Component Value Date/Time    Sodium 137 08/23/2021 12:01 PM    Potassium 3.6 08/23/2021 12:01 PM    Chloride 101 08/23/2021 12:01 PM    CO2 32 08/23/2021 12:01 PM    Anion gap 4 (L) 08/23/2021 12:01 PM    Glucose 125 (H) 08/23/2021 12:01 PM    BUN 13 08/23/2021 12:01 PM    Creatinine 0.72 08/23/2021 12:01 PM    BUN/Creatinine ratio 18 08/23/2021 12:01 PM    GFR est AA >60 08/23/2021 12:01 PM    GFR est non-AA >60 08/23/2021 12:01 PM    Calcium 9.9 08/23/2021 12:01 PM    Bilirubin, total 0.2 08/23/2021 12:01 PM    ALT (SGPT) 45 08/23/2021 12:01 PM    Alk. phosphatase 101 08/23/2021 12:01 PM    Protein, total 7.7 08/23/2021 12:01 PM    Albumin 4.4 08/23/2021 12:01 PM    Globulin 3.3 08/23/2021 12:01 PM    A-G Ratio 1.3 08/23/2021 12:01 PM      Lab Results   Component Value Date/Time    Hemoglobin A1c 6.6 (H) 08/23/2021 12:01 PM    Hemoglobin A1c (POC) 6.3 11/24/2021 12:56 PM         Review of Systems   Constitutional: Negative for malaise/fatigue. HENT: Negative for congestion. Eyes: Negative for blurred vision. Respiratory: Negative for cough and shortness of breath. Cardiovascular: Negative for chest pain, palpitations and leg swelling. Gastrointestinal: Negative for abdominal pain, constipation and heartburn. Genitourinary: Negative for dysuria, frequency and urgency. Musculoskeletal: Negative for back pain and joint pain. Neurological: Negative for dizziness, tingling and headaches. Endo/Heme/Allergies: Negative for environmental allergies. Psychiatric/Behavioral: Negative for depression. The patient does not have insomnia. Physical Exam  Vitals and nursing note reviewed. Constitutional:       Appearance: Normal appearance. She is well-developed.       Comments: /63 (BP 1 Location: Left upper arm, BP Patient Position: Sitting, BP Cuff Size: Adult)   Pulse 72   Temp 97.6 °F (36.4 °C) (Oral)   Resp 16   Ht 5' 2\" (1.575 m)   Wt 157 lb 6.4 oz (71.4 kg)   LMP 03/01/2013   SpO2 98%   BMI 28.79 kg/m²    HENT:      Right Ear: Tympanic membrane and ear canal normal.      Left Ear: Tympanic membrane and ear canal normal.   Neck:      Thyroid: No thyromegaly. Cardiovascular:      Rate and Rhythm: Normal rate and regular rhythm. Heart sounds: Normal heart sounds. Pulmonary:      Effort: Pulmonary effort is normal.      Breath sounds: Normal breath sounds. Abdominal:      General: Bowel sounds are normal.      Palpations: Abdomen is soft. There is no mass. Tenderness: There is no abdominal tenderness. Musculoskeletal:         General: Normal range of motion. Cervical back: Normal range of motion and neck supple. Right lower leg: No edema. Left lower leg: No edema. Lymphadenopathy:      Cervical: No cervical adenopathy. Skin:     General: Skin is warm and dry. Neurological:      General: No focal deficit present. Mental Status: She is alert and oriented to person, place, and time. Psychiatric:         Mood and Affect: Mood normal.         ASSESSMENT and PLAN  Diagnoses and all orders for this visit:    1. Essential hypertension  Stable at goal.      2. IGT (impaired glucose tolerance)  a1c at 6.2%. Continue to monitor. Work on diet and exercise. -     AMB POC HEMOGLOBIN A1C  -     AMB POC GLUCOSE, QUANTITATIVE, BLOOD    3. Mixed hyperlipidemia  -     LIPID PANEL; Future    4. Adjustment reaction with anxiety and depression  Stable on lexapro. 5. Encounter for long-term (current) use of medications  -     METABOLIC PANEL, COMPREHENSIVE; Future      Follow-up and Dispositions    · Return in about 5 months (around 9/13/2022).        reviewed diet, exercise and weight control  cardiovascular risk and specific lipid/LDL goals reviewed  reviewed medications and side effects in detail  specific diabetic recommendations: low cholesterol diet, weight control and daily exercise discussed and glycohemoglobin and other lab monitoring discussed     I have discussed diagnosis listed in this note with pt and/or family. I have discussed treatment plans and options and the risk/benefit analysis of those options, including safe use of medications and possible medication side effects. Through the use of shared decision making we have agreed to the above plan. The patient has received an after-visit summary and questions were answered concerning future plans and follow up. Advise pt of any urgent changes then to proceed to the ER.

## 2022-04-18 RX ORDER — POTASSIUM CHLORIDE 750 MG/1
10 TABLET, EXTENDED RELEASE ORAL DAILY
Qty: 30 TABLET | Refills: 6 | Status: SHIPPED | OUTPATIENT
Start: 2022-04-18

## 2022-06-23 ENCOUNTER — TELEPHONE (OUTPATIENT)
Dept: SLEEP MEDICINE | Age: 62
End: 2022-06-23

## 2022-06-23 NOTE — TELEPHONE ENCOUNTER
Patient had questions concerning how CPAP will stop pt from snoring. Explanation given to patient and she understands.

## 2022-06-30 ENCOUNTER — TELEPHONE (OUTPATIENT)
Dept: SLEEP MEDICINE | Age: 62
End: 2022-06-30

## 2022-06-30 NOTE — TELEPHONE ENCOUNTER
Spoke with patient about her proble with her pressure she stated she is using a nasal mask and when she opens her mouth to talk the air blows out. She wanted to know if that was normal. Explained why that happens and that will happen because the air is not going down the airway.  She expressed relief and was going to keep trying not to talk with the mask on

## 2022-06-30 NOTE — TELEPHONE ENCOUNTER
Patient called and asked to speak with someone regarding concerns for pressure settings on her machine. She can be reached at [phone].

## 2022-09-14 ENCOUNTER — OFFICE VISIT (OUTPATIENT)
Dept: FAMILY MEDICINE CLINIC | Age: 62
End: 2022-09-14
Payer: COMMERCIAL

## 2022-09-14 VITALS
HEART RATE: 79 BPM | RESPIRATION RATE: 18 BRPM | DIASTOLIC BLOOD PRESSURE: 76 MMHG | WEIGHT: 159.4 LBS | BODY MASS INDEX: 29.33 KG/M2 | OXYGEN SATURATION: 96 % | HEIGHT: 62 IN | SYSTOLIC BLOOD PRESSURE: 134 MMHG | TEMPERATURE: 98.6 F

## 2022-09-14 DIAGNOSIS — Z23 ENCOUNTER FOR IMMUNIZATION: ICD-10-CM

## 2022-09-14 DIAGNOSIS — F43.23 ADJUSTMENT REACTION WITH ANXIETY AND DEPRESSION: ICD-10-CM

## 2022-09-14 DIAGNOSIS — R73.02 IGT (IMPAIRED GLUCOSE TOLERANCE): ICD-10-CM

## 2022-09-14 DIAGNOSIS — Z20.822 ENCOUNTER FOR LABORATORY TESTING FOR COVID-19 VIRUS: ICD-10-CM

## 2022-09-14 DIAGNOSIS — Z91.09 ENVIRONMENTAL ALLERGIES: ICD-10-CM

## 2022-09-14 DIAGNOSIS — Z79.899 ENCOUNTER FOR LONG-TERM (CURRENT) USE OF MEDICATIONS: ICD-10-CM

## 2022-09-14 DIAGNOSIS — E78.2 MIXED HYPERLIPIDEMIA: ICD-10-CM

## 2022-09-14 DIAGNOSIS — I10 ESSENTIAL HYPERTENSION: Primary | ICD-10-CM

## 2022-09-14 LAB
ALBUMIN SERPL-MCNC: 4.3 G/DL (ref 3.5–5)
ALBUMIN/GLOB SERPL: 1.3 {RATIO} (ref 1.1–2.2)
ALP SERPL-CCNC: 74 U/L (ref 45–117)
ALT SERPL-CCNC: 28 U/L (ref 12–78)
ANION GAP SERPL CALC-SCNC: 2 MMOL/L (ref 5–15)
AST SERPL-CCNC: 17 U/L (ref 15–37)
BILIRUB SERPL-MCNC: 0.3 MG/DL (ref 0.2–1)
BUN SERPL-MCNC: 12 MG/DL (ref 6–20)
BUN/CREAT SERPL: 15 (ref 12–20)
CALCIUM SERPL-MCNC: 9.6 MG/DL (ref 8.5–10.1)
CHLORIDE SERPL-SCNC: 105 MMOL/L (ref 97–108)
CHOLEST SERPL-MCNC: 168 MG/DL
CO2 SERPL-SCNC: 31 MMOL/L (ref 21–32)
CREAT SERPL-MCNC: 0.8 MG/DL (ref 0.55–1.02)
ERYTHROCYTE [DISTWIDTH] IN BLOOD BY AUTOMATED COUNT: 13.3 % (ref 11.5–14.5)
EXP DATE SOLUTION: NORMAL
EXP DATE SWAB: NORMAL
GLOBULIN SER CALC-MCNC: 3.3 G/DL (ref 2–4)
GLUCOSE POC: 136 MG/DL
GLUCOSE SERPL-MCNC: 124 MG/DL (ref 65–100)
HBA1C MFR BLD HPLC: 6.1 %
HCT VFR BLD AUTO: 38.1 % (ref 35–47)
HDLC SERPL-MCNC: 63 MG/DL
HDLC SERPL: 2.7 {RATIO} (ref 0–5)
HGB BLD-MCNC: 12.3 G/DL (ref 11.5–16)
LDLC SERPL CALC-MCNC: 83.8 MG/DL (ref 0–100)
LOT NUMBER SOLUTION: NORMAL
LOT NUMBER SWAB: NORMAL
MCH RBC QN AUTO: 31.2 PG (ref 26–34)
MCHC RBC AUTO-ENTMCNC: 32.3 G/DL (ref 30–36.5)
MCV RBC AUTO: 96.7 FL (ref 80–99)
NRBC # BLD: 0 K/UL (ref 0–0.01)
NRBC BLD-RTO: 0 PER 100 WBC
PLATELET # BLD AUTO: 273 K/UL (ref 150–400)
PMV BLD AUTO: 10.8 FL (ref 8.9–12.9)
POTASSIUM SERPL-SCNC: 4 MMOL/L (ref 3.5–5.1)
PROT SERPL-MCNC: 7.6 G/DL (ref 6.4–8.2)
RBC # BLD AUTO: 3.94 M/UL (ref 3.8–5.2)
SARS-COV-2 RNA POC: NEGATIVE
SODIUM SERPL-SCNC: 138 MMOL/L (ref 136–145)
TRIGL SERPL-MCNC: 106 MG/DL (ref ?–150)
VLDLC SERPL CALC-MCNC: 21.2 MG/DL
WBC # BLD AUTO: 3.6 K/UL (ref 3.6–11)

## 2022-09-14 PROCEDURE — 90471 IMMUNIZATION ADMIN: CPT | Performed by: FAMILY MEDICINE

## 2022-09-14 PROCEDURE — 87635 SARS-COV-2 COVID-19 AMP PRB: CPT | Performed by: FAMILY MEDICINE

## 2022-09-14 PROCEDURE — 83036 HEMOGLOBIN GLYCOSYLATED A1C: CPT | Performed by: FAMILY MEDICINE

## 2022-09-14 PROCEDURE — 99214 OFFICE O/P EST MOD 30 MIN: CPT | Performed by: FAMILY MEDICINE

## 2022-09-14 PROCEDURE — 90686 IIV4 VACC NO PRSV 0.5 ML IM: CPT | Performed by: FAMILY MEDICINE

## 2022-09-14 PROCEDURE — 82947 ASSAY GLUCOSE BLOOD QUANT: CPT | Performed by: FAMILY MEDICINE

## 2022-09-14 NOTE — PROGRESS NOTES
Patient identified with two identification factors, Name and Date of Birth. Chief Complaint   Patient presents with    Cholesterol Problem     5 month follow-up. Hypertension       Visit Vitals  /65 (BP 1 Location: Right arm, BP Patient Position: Sitting, BP Cuff Size: Adult)   Pulse 79   Temp 98.6 °F (37 °C) (Oral)   Resp 18   Ht 5' 2\" (1.575 m)   Wt 159 lb 6.4 oz (72.3 kg)   LMP 03/01/2013   SpO2 96%   BMI 29.15 kg/m²       Health Maintenance Due   Topic    COVID-19 Vaccine (4 - Booster for Pfizer series)    Flu Vaccine (1)        1. \"Have you been to the ER, urgent care clinic since your last visit? Hospitalized since your last visit? \" No    2. \"Have you seen or consulted any other health care providers outside of the [address] [place] [address] since your last visit? \" No     3. For patients aged 39-70: Has the patient had a colonoscopy / FIT/ Cologuard? No      If the patient is female:    4. For patients aged 41-77: Has the patient had a mammogram within the past 2 years? Yes - no Care Gap present      5. For patients aged 21-65: Has the patient had a pap smear?  Yes - no Care Gap present

## 2022-09-14 NOTE — LETTER
NOTIFICATION RETURN TO WORK / SCHOOL    9/14/2022 9:39 AM    Ms. Moore [address] [place] [address]      To Whom It May Concern:    Cortez Lowe is currently under the care of [facility]. She will return to work/school on: September 19. 2022    If there are questions or concerns please have the patient contact our office.         Sincerely,      Aquiles Montes MD

## 2022-09-14 NOTE — PROGRESS NOTES
After obtaining consent, and per orders of Dr. Yi Garcia, injection of Influenza FLULAVAL left deltoid given by Jayda Mahajan LPN. Patient instructed to remain in clinic for 20 minutes afterwards, and to report any adverse reaction to me immediately.

## 2022-09-14 NOTE — PROGRESS NOTES
HISTORY OF PRESENT ILLNESS  Tiny Robbins is a 65 y.o. female. HPI   Follow up on chronic medical problems. Overall feeling well. Sleep and mood has been good. Has had some sinus congestion and drainage but thinks d/t her allergies. Her COVID19 test is negative. HTN follow up:  Compliant w/ meds, low salt diet, and exercise. No home bp monitoring. No swelling, headache or dizziness. No chest pain, SOB, palpitations. Otherwise feeling well since the last visit. Glucose intolerance reveiw:  She has IGT. Last a1c level was 6.3%. She did not want to start on medication. She has been trying to eat healthier. Has been trying to get in more exercise. Weight is down by 6 pounds. Diabetic ROS - further diabetic ROS: no polyuria or polydipsia, no chest pain, dyspnea or TIA's, no numbness, tingling or pain in extremities, no unusual visual symptoms. Lab review: orders written for new lab studies as appropriate; see orders    Depression Review:  Patient is seen for followup of depression. Treatment includes Lexapro. Overall she has been good for the last several months. Unitl the past few days. Not sure what has trigger her to feel more sad. Feels that she needs \"mental health break\" from work. Otherwise and before this week her mood has been improved. Having more days that she is feeling \"happy\" and feeling back in control. Still has some moments of anxiety that will come on but overall she is able to do meditation to help calm herself. Sleeping better on most nights. Ongoing symptoms include occasional fatigue. She denies depressed mood, insomnia and hopelessness. She experiences the following side effects from the treatment: none.     Patient Active Problem List   Diagnosis Code    Anemia NEC     Insomnia G47.00    Encounter for medication monitoring Z51.81    Environmental allergies Z91.09    IGT (impaired glucose tolerance) R73.02    Essential hypertension I10    Mild depression F32. A       Current Outpatient Medications   Medication Sig Dispense Refill    amLODIPine (NORVASC) 5 mg tablet TAKE 1 TABLET BY MOUTH EVERY DAY IN THE MORNING 90 Tablet 3    potassium chloride (KLOR-CON M10) 10 mEq tablet Take 1 Tablet by mouth daily. 30 Tablet 6    escitalopram oxalate (Lexapro) 10 mg tablet Take 1 1/2 tabs by mouth daily 45 Tablet 5    atorvastatin (LIPITOR) 10 mg tablet TAKE 1 TABLET BY MOUTH EVERY DAY 90 Tablet 3    olmesartan-hydroCHLOROthiazide (BENICAR HCT) 40-25 mg per tablet TAKE 1 TABLET BY MOUTH EVERY DAY 90 Tablet 2    omeprazole (PRILOSEC) 40 mg capsule TAKE 1 CAPSULE BY MOUTH EVERY DAY 90 Capsule 3    ibuprofen (MOTRIN) 800 mg tablet Take 1 tablet by mouth every 8 hours as needed for pain 90 Tab 1    traZODone (DESYREL) 50 mg tablet TAKE 1/2-1 TABLET BY MOUTH NIGHTLY AS NEEDED FOR SLEEP 30 Tab 3    aspirin 81 mg tablet Take 81 mg by mouth daily.          No Known Allergies      Past Medical History:   Diagnosis Date    Anemia NEC     Glucose intolerance     HTN (hypertension) 3/26/2010    Insomnia            Past Surgical History:   Procedure Laterality Date    ENDOSCOPY, COLON, DIAGNOSTIC      HX GYN  2006    fibroids removed from uterus    HX TUBAL LIGATION  1993           Family History   Problem Relation Age of Onset    Stroke Mother     Hypertension Sister     Stroke Brother     Hypertension Sister     No Known Problems Sister     No Known Problems Sister     No Known Problems Sister     Stroke Brother     Diabetes Maternal Uncle        Social History     Tobacco Use    Smoking status: Some Days    Smokeless tobacco: Never   Substance Use Topics    Alcohol use: Yes     Comment: occas        Lab Results   Component Value Date/Time    WBC 4.6 08/23/2021 12:01 PM    HGB 12.3 08/23/2021 12:01 PM    HCT 36.9 08/23/2021 12:01 PM    PLATELET 633 28/57/4275 12:01 PM    MCV 94.9 08/23/2021 12:01 PM     Lab Results   Component Value Date/Time    Cholesterol, total 179 04/13/2022 08:57 AM    HDL Cholesterol 52 04/13/2022 08:57 AM    LDL, calculated 102.4 (H) 04/13/2022 08:57 AM    Triglyceride 123 04/13/2022 08:57 AM    CHOL/HDL Ratio 3.4 04/13/2022 08:57 AM     Lab Results   Component Value Date/Time    TSH 1.820 04/28/2015 04:05 PM    T4, Free 1.15 04/28/2015 04:05 PM      Lab Results   Component Value Date/Time    Sodium 137 04/13/2022 08:57 AM    Potassium 3.4 (L) 04/13/2022 08:57 AM    Chloride 100 04/13/2022 08:57 AM    CO2 29 04/13/2022 08:57 AM    Anion gap 8 04/13/2022 08:57 AM    Glucose 135 (H) 04/13/2022 08:57 AM    BUN 10 04/13/2022 08:57 AM    Creatinine 0.82 04/13/2022 08:57 AM    BUN/Creatinine ratio 12 04/13/2022 08:57 AM    GFR est AA >60 04/13/2022 08:57 AM    GFR est non-AA >60 04/13/2022 08:57 AM    Calcium 9.5 04/13/2022 08:57 AM    Bilirubin, total 0.4 04/13/2022 08:57 AM    ALT (SGPT) 31 04/13/2022 08:57 AM    Alk. phosphatase 80 04/13/2022 08:57 AM    Protein, total 7.8 04/13/2022 08:57 AM    Albumin 4.2 04/13/2022 08:57 AM    Globulin 3.6 04/13/2022 08:57 AM    A-G Ratio 1.2 04/13/2022 08:57 AM      Lab Results   Component Value Date/Time    Hemoglobin A1c 6.6 (H) 08/23/2021 12:01 PM    Hemoglobin A1c (POC) 6.2 04/13/2022 08:31 AM         Review of Systems   Constitutional:  Negative for malaise/fatigue. HENT:  Negative for congestion. Eyes:  Negative for blurred vision. Respiratory:  Negative for cough and shortness of breath. Cardiovascular:  Negative for chest pain, palpitations and leg swelling. Gastrointestinal:  Negative for abdominal pain, constipation and heartburn. Genitourinary:  Negative for dysuria, frequency and urgency. Musculoskeletal:  Negative for back pain and joint pain. Neurological:  Negative for dizziness, tingling and headaches. Endo/Heme/Allergies:  Negative for environmental allergies. Psychiatric/Behavioral:  Negative for depression. The patient does not have insomnia.       Physical Exam  Vitals and nursing note reviewed. Constitutional:       Appearance: Normal appearance. She is well-developed. Comments: /76   Pulse 79   Temp 98.6 °F (37 °C) (Oral)   Resp 18   Ht 5' 2\" (1.575 m)   Wt 159 lb 6.4 oz (72.3 kg)   LMP 03/01/2013   SpO2 96%   BMI 29.15 kg/m²    HENT:      Right Ear: Tympanic membrane and ear canal normal.      Left Ear: Tympanic membrane and ear canal normal.   Neck:      Thyroid: No thyromegaly. Cardiovascular:      Rate and Rhythm: Normal rate and regular rhythm. Heart sounds: Normal heart sounds. Pulmonary:      Effort: Pulmonary effort is normal.      Breath sounds: Normal breath sounds. Abdominal:      General: Bowel sounds are normal.      Palpations: Abdomen is soft. There is no mass. Tenderness: There is no abdominal tenderness. Musculoskeletal:         General: Normal range of motion. Cervical back: Normal range of motion and neck supple. Right lower leg: No edema. Left lower leg: No edema. Lymphadenopathy:      Cervical: No cervical adenopathy. Skin:     General: Skin is warm and dry. Neurological:      General: No focal deficit present. Mental Status: She is alert and oriented to person, place, and time. Psychiatric:         Mood and Affect: Mood normal.       ASSESSMENT and PLAN  Diagnoses and all orders for this visit:    1. Essential hypertension  Discussed sodium restriction, high k rich diet, maintaining ideal body weight and regular exercise program such as daily walking 30 min perday 4-5 times per week, as physiologic means to achieve blood pressure control. Medication compliance advised. 2. IGT (impaired glucose tolerance)  A1c stable at 6.1%. Continue to monitor. Work on diet and exercise. -     AMB POC HEMOGLOBIN A1C  -     AMB POC GLUCOSE, QUANTITATIVE, BLOOD    3. Mixed hyperlipidemia  -     LIPID PANEL; Future    4.  Adjustment reaction with anxiety and depression  Note given to RTW on 9/19.      5. Encounter for long-term (current) use of medications  -     CBC W/O DIFF; Future  -     METABOLIC PANEL, COMPREHENSIVE; Future    6. Encounter for laboratory testing for COVID-19 virus  7. Environmental Allergies  Add claritin as needed. -     AMB POC COVID-19 COV      Follow-up and Dispositions    Return in about 6 months (around 3/14/2023). reviewed diet, exercise and weight control  cardiovascular risk and specific lipid/LDL goals reviewed  reviewed medications and side effects in detail  specific diabetic recommendations: low cholesterol diet, weight control and daily exercise discussed and glycohemoglobin and other lab monitoring discussed    I have discussed diagnosis listed in this note with pt and/or family. I have discussed treatment plans and options and the risk/benefit analysis of those options, including safe use of medications and possible medication side effects. Through the use of shared decision making we have agreed to the above plan. The patient has received an after-visit summary and questions were answered concerning future plans and follow up. Advise pt of any urgent changes then to proceed to the ER.

## 2022-10-05 ENCOUNTER — OFFICE VISIT (OUTPATIENT)
Dept: FAMILY MEDICINE CLINIC | Age: 62
End: 2022-10-05

## 2022-10-05 DIAGNOSIS — Z23 ENCOUNTER FOR IMMUNIZATION: Primary | ICD-10-CM

## 2022-10-05 NOTE — PROGRESS NOTES
After obtaining consent, and per orders of Dr. Toribio Co, injection of [address] Booster (left deltoid) given by Rodolfo Arriaga LPN. Patient instructed to remain in clinic for 20 minutes afterwards, and to report any adverse reaction to me immediately.

## 2022-10-11 DIAGNOSIS — F32.A MILD DEPRESSION: ICD-10-CM

## 2022-10-11 DIAGNOSIS — K21.9 GASTROESOPHAGEAL REFLUX DISEASE WITHOUT ESOPHAGITIS: ICD-10-CM

## 2022-10-11 RX ORDER — ESCITALOPRAM OXALATE 10 MG/1
TABLET ORAL
Qty: 45 TABLET | Refills: 5 | Status: SHIPPED | OUTPATIENT
Start: 2022-10-11

## 2022-10-11 RX ORDER — POTASSIUM CHLORIDE 750 MG/1
TABLET, FILM COATED, EXTENDED RELEASE ORAL
Qty: 90 TABLET | Refills: 2 | Status: SHIPPED | OUTPATIENT
Start: 2022-10-11

## 2022-10-11 RX ORDER — OMEPRAZOLE 40 MG/1
CAPSULE, DELAYED RELEASE ORAL
Qty: 90 CAPSULE | Refills: 3 | Status: SHIPPED | OUTPATIENT
Start: 2022-10-11

## 2022-10-20 ENCOUNTER — TELEPHONE (OUTPATIENT)
Dept: SLEEP MEDICINE | Age: 62
End: 2022-10-20

## 2022-10-20 NOTE — TELEPHONE ENCOUNTER
Mrs. Shepherd Hubert called in to schedule an appointment and I informed her she is already scheduled for an appointment for next Tuesday and provided the day and time. Also, reminded her to bring her device to this appointment.

## 2022-10-25 ENCOUNTER — DOCUMENTATION ONLY (OUTPATIENT)
Dept: SLEEP MEDICINE | Age: 62
End: 2022-10-25

## 2022-10-25 ENCOUNTER — OFFICE VISIT (OUTPATIENT)
Dept: SLEEP MEDICINE | Age: 62
End: 2022-10-25
Payer: COMMERCIAL

## 2022-10-25 VITALS
HEIGHT: 62 IN | OXYGEN SATURATION: 100 % | WEIGHT: 158.3 LBS | SYSTOLIC BLOOD PRESSURE: 139 MMHG | HEART RATE: 66 BPM | BODY MASS INDEX: 29.13 KG/M2 | DIASTOLIC BLOOD PRESSURE: 80 MMHG

## 2022-10-25 DIAGNOSIS — G47.33 OSA (OBSTRUCTIVE SLEEP APNEA): Primary | ICD-10-CM

## 2022-10-25 PROCEDURE — 3078F DIAST BP <80 MM HG: CPT | Performed by: NURSE PRACTITIONER

## 2022-10-25 PROCEDURE — 99213 OFFICE O/P EST LOW 20 MIN: CPT | Performed by: NURSE PRACTITIONER

## 2022-10-25 PROCEDURE — 3074F SYST BP LT 130 MM HG: CPT | Performed by: NURSE PRACTITIONER

## 2022-10-25 NOTE — PROGRESS NOTES
Sobia Hendrix (: 1960) is a 65 y.o. female, established patient, seen for positive airway pressure follow-up, she was last seen by Dr. Hope Newberry on 2022, prior notes and sleep tests reviewed in detail. Home sleep test 3/2022 showed AHI of 9.7/hr with a lowest SaO2 of 78%, duration of SaO2 < 88% 3.6 min. Snoring 30.5% of study. ASSESSMENT/PLAN:    ICD-10-CM ICD-9-CM    1. REYMUNDO (obstructive sleep apnea)  G47.33 327.23 AMB SUPPLY ORDER      2. Adult BMI 28.0-28.9 kg/sq m  Z68.28 V85.24           AHI = 9.7(3/2022). On 3B Neris : 6-20 cmH2O Set up 2022. She is adherent with PAP therapy and PAP continues to benefit patient and remains necessary for control of her sleep apnea. Follow-up and Dispositions    Return in about 1 year (around 10/25/2023) for annual follow up . Sleep Apnea -   Continue on current pressures    *  Supplies ordered - nasal mask and heated tubing    Orders Placed This Encounter    AMB SUPPLY ORDER     Diagnosis: (G47.33) REYMUNDO (obstructive sleep apnea)  (primary encounter diagnosis)     Replacement Supplies for Positive Airway Pressure Therapy Device:   Duration of need: 99 months.  Nasal Cushion (Replace) 2 per month. Dreamwear style mask  [id number] Nasal Interface Mask 1 every 3 months.  Pos Airway pressure chin strap   Headgear 1 every 6 months.  Tubing with heating element 1 every 3 months.  Filter(s) Disposable 2 per month.  Filter(s) Non-Disposable 1 every 6 months. .   [facility] for Humidifier (Replace) 1 every 6 months. JOANNA Cheung; NPI: [id number]    Electronically signed. Date:- 10/25/22       * Counseling was provided regarding the importance of regular PAP use with emphasis on ensuring sufficient total sleep time, proper sleep hygiene, and safe driving. * Re-enforced proper and regular cleaning for the device.   We discussed the risk associated with use of cleaning devices and she will continue with use of dish soap and water as the appropriate cleaning method. * She was asked to contact our office for any problems regarding PAP therapy. 2. Encouraged continued weight management program through appropriate diet and exercise regimen as significant weight reduction has been shown to reduce severity of obstructive sleep apnea. SUBJECTIVE/OBJECTIVE:    She  is seen today for follow up on PAP device and reports no problems using the device. The following concerns reviewed:    Drowsiness no Problems exhaling no   Snoring no Forget to put on no   Mask Comfortable yes Can't fall asleep no   Dry Mouth no Mask falls off no   Air Leaking no Frequent awakenings no       She admits that her sleep has improved on PAP therapy using nasal pillows mask and heated tubing. She reports that she is sleeping better and not tired during the day. She likes the device. She is having some discomfort from the nasal mask on the sides of her nose, she will trial dreamwear nasal mask or brevida as alternative. Review of device download indicated:  Auto pressure: 6-20 cmH2O; Mean Pressure: 7.1 cmH2O;  95th percentile Pressure: 8.2 cmH2O   Large Leak: 6.2 L/Min     % Used Days >= 4 hours: 90. Avg hours used:  6:16. Therapy Apnea Index averaged over PAP use: 2.5 /hr which reflects improved sleep breathing condition. [place] Sleepiness Score: 1 and Modified F.O.S.Q. Score Total / 2: 20 which reflects improved sleep quality over therapy time. Sleep Review of Systems: notable for Negative difficulty falling asleep; Positive awakenings at night; Negative early morning headaches; Negative memory problems; Negative concentration issues;  Negative chest pain; Negative shortness of breath; Negative significant joint pain at night; Negative rashes or itching; Negative heartburn; Negative significant mood issues; 0 afternoon naps per week      Visit Vitals  /80 (BP 1 Location: Left upper arm, BP Patient Position: Sitting)   Pulse 66   Ht 5' 2\" (1.575 m)   Wt 158 lb 4.8 oz (71.8 kg)   LMP 03/01/2013   SpO2 100%   BMI 28.95 kg/m²          General:   Alert, oriented, not in acute distress   Eyes:  Anicteric Sclerae; no obvious strabismus   Nose:  No obvious nasal septum deviation    Neck:   Midline trachea   Chest/Lungs:  Symmetrical lung expansion, clear lung fields on auscultation    CVS:  Normal rate, regular rhythm,  no JVD   Extremities:  No obvious rashes, no edema    Neuro:  No focal deficits; No obvious tremor    Psych:  Normal affect,  normal countenance     Patient's phone number [phone] (home) [phone] (work) was reviewed and confirmed for accuracy. She gives permission for messages regarding results and appointments to be left at that number. On this date 10/25/2022 I have spent 20 minutes reviewing previous notes, test results and face to face with the patient discussing the diagnosis and importance of compliance with the treatment plan as well as documenting on the day of the visit. An electronic signature was used to authenticate this note.     -- Maria Isabel Barrera NP, [facility]  10/25/22

## 2022-10-25 NOTE — PATIENT INSTRUCTIONS
[address] [facility] [place]. [place], [address]  Tel.  [phone]  Fax. [address] [facility] [address]  [place], [address] [facility]  Tel.  [phone]  Fax. [phone] [address]  Tel.  [phone]  Fax. [phone]     Learning About CPAP for Sleep Apnea  What is CPAP? CPAP is a small machine that you use at home every night while you sleep. It increases air pressure in your throat to keep your airway open. When you have sleep apnea, this can help you sleep better so you feel much better. CPAP stands for \"continuous positive airway pressure. \"  The CPAP machine will have one of the following:  A mask that covers your nose and mouth  Prongs that fit into your nose  A mask that covers your nose only, the most common type. This type is called NCPAP. The N stands for \"nasal.\"  Why is it done? CPAP is usually the best treatment for obstructive sleep apnea. It is the first treatment choice and the most widely used. Your doctor may suggest CPAP if you have: Moderate to severe sleep apnea. Sleep apnea and coronary artery disease (CAD) or heart failure. How does it help? CPAP can help you have more normal sleep, so you feel less sleepy and more alert during the daytime. CPAP may help keep heart failure or other heart problems from getting worse. NCPAP may help lower your blood pressure. If you use CPAP, your bed partner may also sleep better because you are not snoring or restless. What are the side effects? Some people who use CPAP have:  A dry or stuffy nose and a sore throat. Irritated skin on the face. Sore eyes. Bloating. If you have any of these problems, work with your doctor to fix them. Here are some things you can try:  Be sure the mask or nasal prongs fit well. See if your doctor can adjust the pressure of your CPAP. If your nose is dry, try a humidifier.   If your nose is runny or stuffy, try decongestant medicine or a steroid nasal spray. If these things do not help, you might try a different type of machine. Some machines have air pressure that adjusts on its own. Others have air pressures that are different when you breathe in than when you breathe out. This may reduce discomfort caused by too much pressure in your nose. Where can you learn more? Go to [employer]  Enter Perla Boyce in the search box to learn more about \"Learning About CPAP for Sleep Apnea. \"   © 8032-8868 Healthwise, Incorporated. Care instructions adapted under license by Rand Bolanos (which disclaims liability or warranty for this information). This care instruction is for use with your licensed healthcare professional. If you have questions about a medical condition or this instruction, always ask your healthcare professional. [address] any warranty or liability for your use of this information. Content Version: 7.0.24081; Last Revised: January 11, 2010  PROPER SLEEP HYGIENE    What to avoid  Do not have drinks with caffeine, such as coffee or black tea, for 8 hours before bed. Do not smoke or use other types of tobacco near bedtime. Nicotine is a stimulant and can keep you awake. Avoid drinking alcohol late in the evening, because it can cause you to wake in the middle of the night. Do not eat a big meal close to bedtime. If you are hungry, eat a light snack. Do not drink a lot of water close to bedtime, because the need to urinate may wake you up during the night. Do not read or watch TV in bed. Use the bed only for sleeping and sexual activity. What to try  Go to bed at the same time every night, and wake up at the same time every morning. Do not take naps during the day. Keep your bedroom quiet, dark, and cool. Get regular exercise, but not within 3 to 4 hours of your bedtime. .  Sleep on a comfortable pillow and mattress.   If watching the clock makes you anxious, turn it facing away from you so you cannot see the time. If you worry when you lie down, start a worry book. Well before bedtime, write down your worries, and then set the book and your concerns aside. Try meditation or other relaxation techniques before you go to bed. If you cannot fall asleep, get up and go to another room until you feel sleepy. Do something relaxing. Repeat your bedtime routine before you go to bed again. Make your house quiet and calm about an hour before bedtime. Turn down the lights, turn off the TV, log off the computer, and turn down the volume on music. This can help you relax after a busy day. Drowsy Driving: The [employer] cites drowsiness as a causing factor in more than 681,679 police reported crashes annually, resulting in 76,000 injuries and 1,500 deaths. Other surveys suggest 55% of people polled have driven while drowsy in the past year, 23% had fallen asleep but not crashed, 3% crashed, and 2% had and accident due to drowsy driving. Who is at risk? Young Drivers: One study of drowsy driving accidents states that 55% of the drivers were under 25 years. Of those, 75% were male. Shift Workers and Travelers: People who work overnight or travel across time zones frequently are at higher risk of experiencing Circadian Rhythm Disorders. They are trying to work and function when their body is programed to sleep. Sleep Deprived: Lack of sleep has a serious impact on your ability to pay attention or focus on a task. Consistently getting less than the average of 8 hours your body needs creates partial or cumulative sleep deprivation. Untreated Sleep Disorders: Sleep Apnea, Narcolepsy, R.L.S., and other sleep disorders (untreated) prevent a person from getting enough restful sleep. This leads to excessive daytime sleepiness and increases the risk for drowsy driving accidents by up to 7 times.   Medications / Alcohol: Even over the counter medications can cause drowsiness. Medications that impair a drivers attention should have a warning label. Alcohol naturally makes you sleepy and on its own can cause accidents. Combined with excessive drowsiness its effects are amplified. Signs of Drowsy Driving:   * You don't remember driving the last few miles   * You may drift out of your deborah   * You are unable to focus and your thoughts wander   * You may yawn more often than normal   * You have difficulty keeping your eyes open / nodding off   * Missing traffic signs, speeding, or tailgating  Prevention-   Good sleep hygiene, lifestyle and behavioral choices have the most impact on drowsy driving. There is no substitute for sleep and the average person requires 8 hours nightly. If you find yourself driving drowsy, stop and sleep. Consider the sleep hygiene tips provided during your visit as well. Medication Refill Policy: Refills for all medications require 1 week advance notice. Please have your pharmacy fax a refill request. We are unable to fax, or call in \"controled substance\" medications and you will need to pick these prescriptions up from our office. [employer] Activation    Thank you for requesting access to [employer]. Please follow the instructions below to securely access and download your online medical record. [employer] allows you to send messages to your doctor, view your test results, renew your prescriptions, schedule appointments, and more. How Do I Sign Up? In your internet browser, go to [email or web address]. Click on the First Time User? Click Here link in the Sign In box. You will see the New Member Sign Up page. Enter your [employer] Access Code exactly as it appears below. You will not need to use this code after youve completed the sign-up process. If you do not sign up before the expiration date, you must request a new code. [employer] Access Code:  Activation code not generated  Current [employer] Status: Active (This is the date your [employer] access code will )    Enter the last four digits of your Social Security Number (xxxx) and Date of Birth (mm/dd/yyyy) as indicated and click Submit. You will be taken to the next sign-up page. Create a [employer] ID. This will be your [employer] login ID and cannot be changed, so think of one that is secure and easy to remember. Create a [employer] password. You can change your password at any time. Enter your Password Reset Question and Answer. This can be used at a later time if you forget your password. Enter your e-mail address. You will receive e-mail notification when new information is available in [address] Click Sign Up. You can now view and download portions of your medical record. Click the [employer] link to download a portable copy of your medical information. Additional Information    If you have questions, please call [phone]. Remember, [employer] is NOT to be used for urgent needs. For medical emergencies, dial 911.

## 2022-10-31 RX ORDER — OLMESARTAN MEDOXOMIL AND HYDROCHLOROTHIAZIDE 40/25 40; 25 MG/1; MG/1
TABLET ORAL
Qty: 90 TABLET | Refills: 2 | Status: SHIPPED | OUTPATIENT
Start: 2022-10-31

## 2022-11-16 RX ORDER — ATORVASTATIN CALCIUM 10 MG/1
TABLET, FILM COATED ORAL
Qty: 90 TABLET | Refills: 3 | Status: SHIPPED | OUTPATIENT
Start: 2022-11-16

## 2023-01-13 LAB — MAMMOGRAPHY, EXTERNAL: NORMAL

## 2023-02-17 NOTE — TELEPHONE ENCOUNTER
Last Visit: 9/14/22 with MD Brayden Gerardo  Next Appointment: 3/14/23 with MD Brayden Gerardo  Previous Refill Encounter(s): 3/11/21 #90 with 1 refill    Requested Prescriptions     Pending Prescriptions Disp Refills    ibuprofen (MOTRIN) 800 mg tablet 90 Tablet 1     Sig: Take 1 tablet by mouth every 8 hours as needed for pain         For Pharmacy Admin Tracking Only    Program: Medication Refill  CPA in place:   Recommendation Provided To:    Intervention Detail: New Rx: 1, reason: Patient Preference  Intervention Accepted By:   Oz Robbins Closed?:   Time Spent (min): 5

## 2023-02-20 RX ORDER — IBUPROFEN 800 MG/1
TABLET ORAL
Qty: 90 TABLET | Refills: 1 | Status: SHIPPED | OUTPATIENT
Start: 2023-02-20

## 2023-04-19 ENCOUNTER — OFFICE VISIT (OUTPATIENT)
Dept: FAMILY MEDICINE CLINIC | Age: 63
End: 2023-04-19
Payer: COMMERCIAL

## 2023-04-19 VITALS
DIASTOLIC BLOOD PRESSURE: 59 MMHG | HEART RATE: 75 BPM | RESPIRATION RATE: 12 BRPM | HEIGHT: 62 IN | TEMPERATURE: 97.7 F | SYSTOLIC BLOOD PRESSURE: 128 MMHG | OXYGEN SATURATION: 97 % | WEIGHT: 162.6 LBS | BODY MASS INDEX: 29.92 KG/M2

## 2023-04-19 DIAGNOSIS — I10 ESSENTIAL HYPERTENSION: Primary | ICD-10-CM

## 2023-04-19 DIAGNOSIS — Z12.11 COLON CANCER SCREENING: ICD-10-CM

## 2023-04-19 DIAGNOSIS — R73.02 IGT (IMPAIRED GLUCOSE TOLERANCE): ICD-10-CM

## 2023-04-19 DIAGNOSIS — Z79.899 ENCOUNTER FOR LONG-TERM (CURRENT) USE OF MEDICATIONS: ICD-10-CM

## 2023-04-19 DIAGNOSIS — E78.2 MIXED HYPERLIPIDEMIA: ICD-10-CM

## 2023-04-19 DIAGNOSIS — G47.33 OBSTRUCTIVE SLEEP APNEA ON CPAP: ICD-10-CM

## 2023-04-19 DIAGNOSIS — Z99.89 OBSTRUCTIVE SLEEP APNEA ON CPAP: ICD-10-CM

## 2023-04-19 DIAGNOSIS — F43.23 ADJUSTMENT REACTION WITH ANXIETY AND DEPRESSION: ICD-10-CM

## 2023-04-19 DIAGNOSIS — K21.9 GASTROESOPHAGEAL REFLUX DISEASE WITHOUT ESOPHAGITIS: ICD-10-CM

## 2023-04-19 PROCEDURE — 3078F DIAST BP <80 MM HG: CPT | Performed by: FAMILY MEDICINE

## 2023-04-19 PROCEDURE — 99214 OFFICE O/P EST MOD 30 MIN: CPT | Performed by: FAMILY MEDICINE

## 2023-04-19 PROCEDURE — 83036 HEMOGLOBIN GLYCOSYLATED A1C: CPT | Performed by: FAMILY MEDICINE

## 2023-04-19 PROCEDURE — 82947 ASSAY GLUCOSE BLOOD QUANT: CPT | Performed by: FAMILY MEDICINE

## 2023-04-19 PROCEDURE — 3074F SYST BP LT 130 MM HG: CPT | Performed by: FAMILY MEDICINE

## 2023-04-19 NOTE — PROGRESS NOTES
HISTORY OF PRESENT ILLNESS  Carlin Patrick is a 65 y.o. female. HPI   Follow up on chronic medical problems. Overall feeling well. Sleep and mood has been good. HTN follow up:  Compliant w/ meds, low salt diet, and exercise. No home bp monitoring. No swelling, headache or dizziness. No chest pain, SOB, palpitations. Otherwise feeling well since the last visit. Glucose intolerance reveiw:  She has IGT. Last a1c level was 6.3%. She did not want to start on medication. She has been trying to eat healthier. Has been trying to get in more exercise. Weight is down by 6 pounds. Diabetic ROS - further diabetic ROS: no polyuria or polydipsia, no chest pain, dyspnea or TIA's, no numbness, tingling or pain in extremities, no unusual visual symptoms. Lab review: orders written for new lab studies as appropriate; see orders    Depression Review:  Patient is seen for followup of depression. Treatment includes Lexapro. Overall she has been good for the last several months unitl the past few weeks. Not sure what has trigger her to feel more sad. Still has trouble handling increased stress. Still feel \"angry\" about the death of her dtg who was kliied a few years ago by her boyfriend. Still has some moments of anxiety and sometimes does not know how to clam herself down. Sleeping better on most nights but still has some nights that she is awake thinking. No SI/HI. She denies hopelessness. She experiences the following side effects from the treatment: none. Patient Active Problem List   Diagnosis Code    Anemia NEC     Insomnia G47.00    Encounter for medication monitoring Z51.81    Environmental allergies Z91.09    IGT (impaired glucose tolerance) R73.02    Essential hypertension I10    Mild depression F32. A       Current Outpatient Medications   Medication Sig Dispense Refill    ibuprofen (MOTRIN) 800 mg tablet Take 1 tablet by mouth every 8 hours as needed for pain 90 Tablet 1 atorvastatin (LIPITOR) 10 mg tablet TAKE 1 TABLET BY MOUTH EVERY DAY 90 Tablet 3    olmesartan-hydroCHLOROthiazide (BENICAR HCT) 40-25 mg per tablet TAKE 1 TABLET BY MOUTH EVERY DAY 90 Tablet 2    omeprazole (PRILOSEC) 40 mg capsule TAKE 1 CAPSULE BY MOUTH EVERY DAY 90 Capsule 3    escitalopram oxalate (LEXAPRO) 10 mg tablet TAKE 1 AND 1/2 TABLETS BY MOUTH DAILY 45 Tablet 5    amLODIPine (NORVASC) 5 mg tablet TAKE 1 TABLET BY MOUTH EVERY DAY IN THE MORNING 90 Tablet 3    potassium chloride (KLOR-CON M10) 10 mEq tablet Take 1 Tablet by mouth daily.  30 Tablet 6    traZODone (DESYREL) 50 mg tablet TAKE 1/2-1 TABLET BY MOUTH NIGHTLY AS NEEDED FOR SLEEP 30 Tab 3       No Known Allergies      Past Medical History:   Diagnosis Date    Anemia NEC     Glucose intolerance     HTN (hypertension) 3/26/2010    Insomnia            Past Surgical History:   Procedure Laterality Date    ENDOSCOPY, COLON, DIAGNOSTIC      HX GYN  2006    fibroids removed from uterus    HX TUBAL LIGATION  1993           Family History   Problem Relation Age of Onset    Stroke Mother     Hypertension Sister     Stroke Brother     Hypertension Sister     No Known Problems Sister     No Known Problems Sister     No Known Problems Sister     Stroke Brother     Diabetes Maternal Uncle        Social History     Tobacco Use    Smoking status: Former     Types: Cigarettes    Smokeless tobacco: Never   Substance Use Topics    Alcohol use: Yes     Comment: occas        Lab Results   Component Value Date/Time    WBC 3.6 09/14/2022 09:53 AM    HGB 12.3 09/14/2022 09:53 AM    HCT 38.1 09/14/2022 09:53 AM    PLATELET 359 50/18/8905 09:53 AM    MCV 96.7 09/14/2022 09:53 AM     Lab Results   Component Value Date/Time    Cholesterol, total 168 09/14/2022 09:53 AM    HDL Cholesterol 63 09/14/2022 09:53 AM    LDL, calculated 83.8 09/14/2022 09:53 AM    Triglyceride 106 09/14/2022 09:53 AM    CHOL/HDL Ratio 2.7 09/14/2022 09:53 AM     Lab Results   Component Value Date/Time    TSH 1.820 04/28/2015 04:05 PM    T4, Free 1.15 04/28/2015 04:05 PM      Lab Results   Component Value Date/Time    Sodium 138 09/14/2022 09:53 AM    Potassium 4.0 09/14/2022 09:53 AM    Chloride 105 09/14/2022 09:53 AM    CO2 31 09/14/2022 09:53 AM    Anion gap 2 (L) 09/14/2022 09:53 AM    Glucose 124 (H) 09/14/2022 09:53 AM    BUN 12 09/14/2022 09:53 AM    Creatinine 0.80 09/14/2022 09:53 AM    BUN/Creatinine ratio 15 09/14/2022 09:53 AM    GFR est AA >60 09/14/2022 09:53 AM    GFR est non-AA >60 09/14/2022 09:53 AM    Calcium 9.6 09/14/2022 09:53 AM    Bilirubin, total 0.3 09/14/2022 09:53 AM    ALT (SGPT) 28 09/14/2022 09:53 AM    Alk. phosphatase 74 09/14/2022 09:53 AM    Protein, total 7.6 09/14/2022 09:53 AM    Albumin 4.3 09/14/2022 09:53 AM    Globulin 3.3 09/14/2022 09:53 AM    A-G Ratio 1.3 09/14/2022 09:53 AM      Lab Results   Component Value Date/Time    Hemoglobin A1c 6.6 (H) 08/23/2021 12:01 PM    Hemoglobin A1c (POC) 6.1 09/14/2022 09:30 AM         Review of Systems   Constitutional:  Negative for malaise/fatigue. HENT:  Negative for congestion. Eyes:  Negative for blurred vision. Respiratory:  Negative for cough and shortness of breath. Cardiovascular:  Negative for chest pain, palpitations and leg swelling. Gastrointestinal:  Negative for abdominal pain, constipation and heartburn. Genitourinary:  Negative for dysuria, frequency and urgency. Musculoskeletal:  Negative for back pain and joint pain. Neurological:  Negative for dizziness, tingling and headaches. Endo/Heme/Allergies:  Negative for environmental allergies. Psychiatric/Behavioral:  Negative for depression. The patient does not have insomnia. Physical Exam  Vitals and nursing note reviewed. Constitutional:       Appearance: Normal appearance. She is well-developed.       Comments: BP (!) 128/59   Pulse 75   Temp 97.7 °F (36.5 °C)   Resp 12   Ht 5' 2\" (1.575 m)   Wt 162 lb 9.6 oz (73.8 kg) LMP 03/01/2013   SpO2 97%   BMI 29.74 kg/m²      HENT:      Right Ear: Tympanic membrane and ear canal normal.      Left Ear: Tympanic membrane and ear canal normal.   Neck:      Thyroid: No thyromegaly. Cardiovascular:      Rate and Rhythm: Normal rate and regular rhythm. Heart sounds: Normal heart sounds. Pulmonary:      Effort: Pulmonary effort is normal.      Breath sounds: Normal breath sounds. Abdominal:      General: Bowel sounds are normal.      Palpations: Abdomen is soft. There is no mass. Tenderness: There is no abdominal tenderness. Musculoskeletal:         General: Normal range of motion. Cervical back: Normal range of motion and neck supple. Right lower leg: No edema. Left lower leg: No edema. Lymphadenopathy:      Cervical: No cervical adenopathy. Skin:     General: Skin is warm and dry. Neurological:      General: No focal deficit present. Mental Status: She is alert and oriented to person, place, and time. Psychiatric:         Mood and Affect: Mood normal.       ASSESSMENT and PLAN  Diagnoses and all orders for this visit:    1. Essential hypertension  Discussed sodium restriction, high k rich diet, maintaining ideal body weight and regular exercise program such as daily walking 30 min perday 4-5 times per week, as physiologic means to achieve blood pressure control. Medication compliance advised. 2. IGT (impaired glucose tolerance)  A1c 6.3%. She still wants to work on diet and exercise and weight reduction. We again discussed starting medication but she will consider at her next visit. -     AMB POC HEMOGLOBIN A1C  -     AMB POC GLUCOSE, QUANTITATIVE, BLOOD    3. Mixed hyperlipidemia  -     LIPID PANEL; Future    4. Adjustment reaction with anxiety and depression  Agrees to see therapist.    -     REFERRAL TO PSYCHOLOGY    5. Gastroesophageal reflux disease without esophagitis  Stable on prilosec    6.  Obstructive sleep apnea on CPAP  Stable     7. Encounter for long-term (current) use of medications  -     METABOLIC PANEL, COMPREHENSIVE; Future      Follwo up 3 months. reviewed diet, exercise and weight control  cardiovascular risk and specific lipid/LDL goals reviewed  reviewed medications and side effects in detail  specific diabetic recommendations: home glucose monitoring emphasized and glycohemoglobin and other lab monitoring discussed    I have discussed diagnosis listed in this note with pt and/or family. I have discussed treatment plans and options and the risk/benefit analysis of those options, including safe use of medications and possible medication side effects. Through the use of shared decision making we have agreed to the above plan. The patient has received an after-visit summary and questions were answered concerning future plans and follow up. Advise pt of any urgent changes then to proceed to the ER.

## 2023-04-19 NOTE — PROGRESS NOTES
Patient identified by 2 identifiers. Chief Complaint   Patient presents with    Follow-up    Diabetes     1. Have you been to the ER, urgent care clinic since your last visit? Hospitalized since your last visit? No    2. Have you seen or consulted any other health care providers outside of the [address] [place] [address] since your last visit? Include any pap smears or colon screening.  No

## 2023-04-20 LAB
ALBUMIN SERPL-MCNC: 4.5 G/DL (ref 3.5–5)
ALBUMIN/GLOB SERPL: 1.3 (ref 1.1–2.2)
ALP SERPL-CCNC: 71 U/L (ref 45–117)
ALT SERPL-CCNC: 36 U/L (ref 12–78)
ANION GAP SERPL CALC-SCNC: 5 MMOL/L (ref 5–15)
AST SERPL-CCNC: 26 U/L (ref 15–37)
BILIRUB SERPL-MCNC: 0.2 MG/DL (ref 0.2–1)
BUN SERPL-MCNC: 12 MG/DL (ref 6–20)
BUN/CREAT SERPL: 13 (ref 12–20)
CALCIUM SERPL-MCNC: 9.6 MG/DL (ref 8.5–10.1)
CHLORIDE SERPL-SCNC: 101 MMOL/L (ref 97–108)
CHOLEST SERPL-MCNC: 144 MG/DL
CO2 SERPL-SCNC: 31 MMOL/L (ref 21–32)
CREAT SERPL-MCNC: 0.96 MG/DL (ref 0.55–1.02)
GLOBULIN SER CALC-MCNC: 3.5 G/DL (ref 2–4)
GLUCOSE POC: 170 MG/DL
GLUCOSE SERPL-MCNC: 114 MG/DL (ref 65–100)
HBA1C MFR BLD HPLC: 6.3 %
HDLC SERPL-MCNC: 62 MG/DL
HDLC SERPL: 2.3 (ref 0–5)
LDLC SERPL CALC-MCNC: 48.6 MG/DL (ref 0–100)
POTASSIUM SERPL-SCNC: 3.5 MMOL/L (ref 3.5–5.1)
PROT SERPL-MCNC: 8 G/DL (ref 6.4–8.2)
SODIUM SERPL-SCNC: 137 MMOL/L (ref 136–145)
TRIGL SERPL-MCNC: 167 MG/DL (ref ?–150)
VLDLC SERPL CALC-MCNC: 33.4 MG/DL

## 2023-05-02 ENCOUNTER — VIRTUAL VISIT (OUTPATIENT)
Dept: BEHAVIORAL/MENTAL HEALTH CLINIC | Age: 63
End: 2023-05-02

## 2023-05-02 DIAGNOSIS — F43.21 GRIEF: Primary | ICD-10-CM

## 2023-05-24 RX ORDER — ESCITALOPRAM OXALATE 10 MG/1
15 TABLET ORAL DAILY
Qty: 135 TABLET | Refills: 3 | Status: SHIPPED | OUTPATIENT
Start: 2023-05-24

## 2023-05-24 NOTE — TELEPHONE ENCOUNTER
Last appointment: 4/19/23  Next appointment: 7/19/23  Previous refill encounter(s): 10/11/22 #45 with 5 refills    Requested Prescriptions     Pending Prescriptions Disp Refills    escitalopram (LEXAPRO) 10 MG tablet [Pharmacy Med Name: ESCITALOPRAM 10 MG TABLET] 135 tablet 3     Sig: Take 1.5 tablets by mouth daily         For Pharmacy Admin Tracking Only    Program: Medication Refill  CPA in place:    Recommendation Provided To:    Intervention Detail: New Rx: 1, reason: Patient Preference  Intervention Accepted By:   Godfrey Bloch Closed?:    Time Spent (min): 5

## 2023-07-19 ENCOUNTER — OFFICE VISIT (OUTPATIENT)
Age: 63
End: 2023-07-19
Payer: COMMERCIAL

## 2023-07-19 VITALS
HEIGHT: 62 IN | WEIGHT: 154.4 LBS | HEART RATE: 74 BPM | SYSTOLIC BLOOD PRESSURE: 124 MMHG | DIASTOLIC BLOOD PRESSURE: 74 MMHG | BODY MASS INDEX: 28.41 KG/M2 | RESPIRATION RATE: 20 BRPM | OXYGEN SATURATION: 98 % | TEMPERATURE: 98.5 F

## 2023-07-19 DIAGNOSIS — Z99.89 OSA ON CPAP: ICD-10-CM

## 2023-07-19 DIAGNOSIS — R73.02 IMPAIRED GLUCOSE TOLERANCE (ORAL): ICD-10-CM

## 2023-07-19 DIAGNOSIS — G47.33 OSA ON CPAP: ICD-10-CM

## 2023-07-19 DIAGNOSIS — Z79.899 ENCOUNTER FOR LONG-TERM (CURRENT) USE OF MEDICATIONS: ICD-10-CM

## 2023-07-19 DIAGNOSIS — I10 ESSENTIAL (PRIMARY) HYPERTENSION: Primary | ICD-10-CM

## 2023-07-19 DIAGNOSIS — K21.9 GASTRO-ESOPHAGEAL REFLUX DISEASE WITHOUT ESOPHAGITIS: ICD-10-CM

## 2023-07-19 DIAGNOSIS — E78.2 MIXED HYPERLIPIDEMIA: ICD-10-CM

## 2023-07-19 DIAGNOSIS — F43.23 ADJUSTMENT DISORDER WITH MIXED ANXIETY AND DEPRESSED MOOD: ICD-10-CM

## 2023-07-19 LAB
GLUCOSE, POC: 143 MG/DL
HBA1C MFR BLD: 6.1 %

## 2023-07-19 PROCEDURE — 99214 OFFICE O/P EST MOD 30 MIN: CPT | Performed by: FAMILY MEDICINE

## 2023-07-19 PROCEDURE — 82962 GLUCOSE BLOOD TEST: CPT | Performed by: FAMILY MEDICINE

## 2023-07-19 PROCEDURE — 3074F SYST BP LT 130 MM HG: CPT | Performed by: FAMILY MEDICINE

## 2023-07-19 PROCEDURE — 3078F DIAST BP <80 MM HG: CPT | Performed by: FAMILY MEDICINE

## 2023-07-19 PROCEDURE — 83036 HEMOGLOBIN GLYCOSYLATED A1C: CPT | Performed by: FAMILY MEDICINE

## 2023-07-19 ASSESSMENT — PATIENT HEALTH QUESTIONNAIRE - PHQ9
5. POOR APPETITE OR OVEREATING: 0
9. THOUGHTS THAT YOU WOULD BE BETTER OFF DEAD, OR OF HURTING YOURSELF: 0
10. IF YOU CHECKED OFF ANY PROBLEMS, HOW DIFFICULT HAVE THESE PROBLEMS MADE IT FOR YOU TO DO YOUR WORK, TAKE CARE OF THINGS AT HOME, OR GET ALONG WITH OTHER PEOPLE: 0
SUM OF ALL RESPONSES TO PHQ QUESTIONS 1-9: 0
8. MOVING OR SPEAKING SO SLOWLY THAT OTHER PEOPLE COULD HAVE NOTICED. OR THE OPPOSITE, BEING SO FIGETY OR RESTLESS THAT YOU HAVE BEEN MOVING AROUND A LOT MORE THAN USUAL: 0
4. FEELING TIRED OR HAVING LITTLE ENERGY: 0
SUM OF ALL RESPONSES TO PHQ QUESTIONS 1-9: 0
SUM OF ALL RESPONSES TO PHQ QUESTIONS 1-9: 0
3. TROUBLE FALLING OR STAYING ASLEEP: 0
SUM OF ALL RESPONSES TO PHQ9 QUESTIONS 1 & 2: 0
SUM OF ALL RESPONSES TO PHQ QUESTIONS 1-9: 0
7. TROUBLE CONCENTRATING ON THINGS, SUCH AS READING THE NEWSPAPER OR WATCHING TELEVISION: 0
2. FEELING DOWN, DEPRESSED OR HOPELESS: 0
1. LITTLE INTEREST OR PLEASURE IN DOING THINGS: 0
6. FEELING BAD ABOUT YOURSELF - OR THAT YOU ARE A FAILURE OR HAVE LET YOURSELF OR YOUR FAMILY DOWN: 0

## 2023-07-19 ASSESSMENT — ENCOUNTER SYMPTOMS
CHEST TIGHTNESS: 0
SHORTNESS OF BREATH: 0
COUGH: 0
BLOOD IN STOOL: 0
ABDOMINAL PAIN: 0
CONSTIPATION: 0
RHINORRHEA: 0

## 2023-07-19 NOTE — PROGRESS NOTES
Chief Complaint   Patient presents with    Follow-up       1. \"Have you been to the ER, urgent care clinic since your last visit? Hospitalized since your last visit? \" No    2. \"Have you seen or consulted any other health care providers outside of the [address] [place] [address] since your last visit? \" No     3. For patients aged 43-73: Has the patient had a colonoscopy / FIT/ Cologuard? Yes      If the patient is female:    4. For patients aged 43-66: Has the patient had a mammogram within the past 2 years? Yes      5. For patients aged 21-65: Has the patient had a pap smear?  Yes     Health Maintenance Due   Topic Date Due    HIV screen  Never done    A1C test (Diabetic or Prediabetic)  07/19/2023

## 2023-07-19 NOTE — PROGRESS NOTES
Subjective:      Patient ID: Dao Rader is a 65 y.o. female. HPI  Follow up on chronic medical problems. Overall feeling well. HTN follow up:  Compliant w/ meds, low salt diet, and exercise. No home bp monitoring. No swelling, headache or dizziness. No chest pain, SOB, palpitations. Otherwise feeling well since the last visit. Glucose intolerance reveiw:  She has IGT. Last a1c level was 6.3%. She did not want to start on medication. She has been trying to eat healthier. Has been trying to get in more exercise. Weight is down by 8 pounds. Diabetic ROS - further diabetic ROS: no polyuria or polydipsia, no chest pain, dyspnea or TIA's, no numbness, tingling or pain in extremities, no unusual visual symptoms. Lab review: orders written for new lab studies as appropriate; see orders    Depression Review:  Patient is seen for followup of depression. Treatment includes Lexapro. Overall she has been good for the last several months. Mood has been good and she has been feeling happy. Still has some moments of anxiety but overall has been feeling much back to her self. Sleeping better on most nights. No SI/HI. She denies hopelessness. She experiences the following side effects from the treatment: none.     Past Medical History:   Diagnosis Date    HTN (hypertension) 3/26/2010    Insomnia      Past Surgical History:   Procedure Laterality Date    COLONOSCOPY      GYN  2006    fibroids removed from uterus    TUBAL LIGATION  1993     Current Outpatient Medications   Medication Sig Dispense Refill    escitalopram (LEXAPRO) 10 MG tablet Take 1.5 tablets by mouth daily 135 tablet 3    amLODIPine (NORVASC) 5 MG tablet TAKE 1 TABLET BY MOUTH EVERY DAY IN THE MORNING      atorvastatin (LIPITOR) 10 MG tablet TAKE 1 TABLET BY MOUTH EVERY DAY      ibuprofen (ADVIL;MOTRIN) 800 MG tablet Take 1 tablet by mouth every 8 hours as needed for pain      olmesartan-hydroCHLOROthiazide (BENICAR HCT)

## 2023-07-24 RX ORDER — AMLODIPINE BESYLATE 5 MG/1
TABLET ORAL
Qty: 90 TABLET | Refills: 3 | Status: SHIPPED | OUTPATIENT
Start: 2023-07-24

## 2023-07-24 NOTE — TELEPHONE ENCOUNTER
Last appointment: 7/19/23  Next appointment: 12/19/23  Previous refill encounter(s): 7/13/22 #90 with 3 refills    Requested Prescriptions     Pending Prescriptions Disp Refills    amLODIPine (NORVASC) 5 MG tablet [Pharmacy Med Name: AMLODIPINE BESYLATE 5 MG TAB] 90 tablet 3     Sig: TAKE 1 TABLET BY MOUTH EVERY DAY IN THE MORNING         For Pharmacy Admin Tracking Only    Program: Medication Refill  CPA in place:    Recommendation Provided To:    Intervention Detail: New Rx: 1, reason: Patient Preference  Intervention Accepted By:   Lyle Kendall Closed?:    Time Spent (min): 5

## 2023-08-09 RX ORDER — OLMESARTAN MEDOXOMIL AND HYDROCHLOROTHIAZIDE 40/25 40; 25 MG/1; MG/1
TABLET ORAL
Qty: 90 TABLET | Refills: 3 | Status: SHIPPED | OUTPATIENT
Start: 2023-08-09

## 2023-08-09 NOTE — TELEPHONE ENCOUNTER
Last appointment: 7/19/23  Next appointment: 12/19/23  Previous refill encounter(s): 10/31/22 #90 with 2 refills    Requested Prescriptions     Pending Prescriptions Disp Refills    olmesartan-hydroCHLOROthiazide (BENICAR HCT) 40-25 MG per tablet [Pharmacy Med Name: OLMESARTAN-HCTZ 40-25 MG TAB] 90 tablet 3     Sig: TAKE 1 TABLET BY Amina Foster Tracking Only    Program: Medication Refill  CPA in place:    Recommendation Provided To:    Intervention Detail: New Rx: 1, reason: Patient Preference  Intervention Accepted By:   Esau Mohan Closed?:    Time Spent (min): 5

## 2023-10-18 RX ORDER — OMEPRAZOLE 40 MG/1
CAPSULE, DELAYED RELEASE ORAL
Qty: 90 CAPSULE | Refills: 3 | Status: SHIPPED | OUTPATIENT
Start: 2023-10-18

## 2023-10-18 NOTE — TELEPHONE ENCOUNTER
Last appointment: 7/19/23  Next appointment: 12/19/23  Previous refill encounter(s): 10/11/22 #90 with 3 refills    Requested Prescriptions     Pending Prescriptions Disp Refills    omeprazole (PRILOSEC) 40 MG delayed release capsule [Pharmacy Med Name: OMEPRAZOLE DR 40 MG CAPSULE] 90 capsule 3     Sig: TAKE 1 CAPSULE BY MOUTH EVERY DAY         For Pharmacy Admin Tracking Only    Program: Medication Refill  CPA in place:    Recommendation Provided To:    Intervention Detail: New Rx: 1, reason: Patient Preference  Intervention Accepted By:   Shana Alvarez Closed?:    Time Spent (min): 5

## 2023-11-06 RX ORDER — POTASSIUM CHLORIDE 750 MG/1
10 TABLET, FILM COATED, EXTENDED RELEASE ORAL DAILY
Qty: 90 TABLET | Refills: 3 | Status: SHIPPED | OUTPATIENT
Start: 2023-11-06

## 2023-11-06 NOTE — TELEPHONE ENCOUNTER
Last appointment: 7/19/23  Next appointment: 12/19/23  Previous refill encounter(s): 4/18/22    Requested Prescriptions     Pending Prescriptions Disp Refills    potassium chloride (KLOR-CON) 10 MEQ extended release tablet [Pharmacy Med Name: POTASSIUM CL ER 10 MEQ TABLET] 90 tablet 3     Sig: TAKE 1 TABLET BY MOUTH EVERY DAY         For Pharmacy Admin Tracking Only    Program: Medication Refill  CPA in place:    Recommendation Provided To:    Intervention Detail: New Rx: 1, reason: Patient Preference  Intervention Accepted By:   Pancho Seymour Closed?:    Time Spent (min): 5

## 2024-01-30 RX ORDER — ATORVASTATIN CALCIUM 10 MG/1
TABLET, FILM COATED ORAL
Qty: 90 TABLET | Refills: 3 | Status: SHIPPED | OUTPATIENT
Start: 2024-01-30

## 2024-01-30 NOTE — TELEPHONE ENCOUNTER
Last appointment: 12/19/23  Next appointment: 3/19/24  Previous refill encounter(s): 11/16/22    Requested Prescriptions     Pending Prescriptions Disp Refills    atorvastatin (LIPITOR) 10 MG tablet [Pharmacy Med Name: ATORVASTATIN 10 MG TABLET] 90 tablet 3     Sig: TAKE 1 TABLET BY MOUTH EVERY DAY         For Pharmacy Admin Tracking Only    Program: Medication Refill  CPA in place:    Recommendation Provided To:   Intervention Detail: New Rx: 1, reason: Patient Preference  Intervention Accepted By:   Gap Closed?:    Time Spent (min): 5

## 2024-02-07 DIAGNOSIS — F51.01 PRIMARY INSOMNIA: Primary | ICD-10-CM

## 2024-02-07 RX ORDER — TRAZODONE HYDROCHLORIDE 50 MG/1
TABLET ORAL
Qty: 30 TABLET | Refills: 3 | Status: SHIPPED | OUTPATIENT
Start: 2024-02-07

## 2024-02-07 NOTE — TELEPHONE ENCOUNTER
Insurance requires a minimum fill for 90 days. If appropriate, please sign pended medication order. If not, please notify me. Requested Prescriptions     Pending Prescriptions Disp Refills    escitalopram oxalate (LEXAPRO) 10 mg tablet 180 Tab 2     Sig: Take 2 Tabs by mouth daily.
06-Feb-2024

## 2024-02-07 NOTE — TELEPHONE ENCOUNTER
Last appointment: 12/19/23  Next appointment: 3/19/24  Previous refill encounter(s): 9/16/20    Requested Prescriptions     Pending Prescriptions Disp Refills    traZODone (DESYREL) 50 MG tablet 30 tablet 3     Sig: TAKE 1/2-1 TABLET BY MOUTH NIGHTLY AS NEEDED FOR SLEEP         For Pharmacy Admin Tracking Only    Program: Medication Refill  CPA in place:    Recommendation Provided To:   Intervention Detail: New Rx: 1, reason: Patient Preference  Intervention Accepted By:   Gap Closed?:    Time Spent (min): 5

## 2024-02-28 ENCOUNTER — OFFICE VISIT (OUTPATIENT)
Age: 64
End: 2024-02-28
Payer: COMMERCIAL

## 2024-02-28 ENCOUNTER — CLINICAL DOCUMENTATION (OUTPATIENT)
Age: 64
End: 2024-02-28

## 2024-02-28 VITALS
OXYGEN SATURATION: 96 % | HEIGHT: 62 IN | HEART RATE: 85 BPM | BODY MASS INDEX: 29.04 KG/M2 | DIASTOLIC BLOOD PRESSURE: 80 MMHG | SYSTOLIC BLOOD PRESSURE: 141 MMHG | WEIGHT: 157.8 LBS | TEMPERATURE: 98.7 F

## 2024-02-28 DIAGNOSIS — F41.9 ANXIETY AND DEPRESSION: ICD-10-CM

## 2024-02-28 DIAGNOSIS — F32.A ANXIETY AND DEPRESSION: ICD-10-CM

## 2024-02-28 DIAGNOSIS — I10 PRIMARY HYPERTENSION: ICD-10-CM

## 2024-02-28 DIAGNOSIS — G47.33 OSA (OBSTRUCTIVE SLEEP APNEA): Primary | ICD-10-CM

## 2024-02-28 PROCEDURE — 3079F DIAST BP 80-89 MM HG: CPT | Performed by: INTERNAL MEDICINE

## 2024-02-28 PROCEDURE — 99213 OFFICE O/P EST LOW 20 MIN: CPT | Performed by: INTERNAL MEDICINE

## 2024-02-28 PROCEDURE — 3077F SYST BP >= 140 MM HG: CPT | Performed by: INTERNAL MEDICINE

## 2024-02-28 ASSESSMENT — SLEEP AND FATIGUE QUESTIONNAIRES
HOW LIKELY ARE YOU TO NOD OFF OR FALL ASLEEP WHILE SITTING AND TALKING TO SOMEONE: 0
HOW LIKELY ARE YOU TO NOD OFF OR FALL ASLEEP IN A CAR, WHILE STOPPED FOR A FEW MINUTES IN TRAFFIC: 0
HOW LIKELY ARE YOU TO NOD OFF OR FALL ASLEEP WHILE SITTING QUIETLY AFTER LUNCH WITHOUT ALCOHOL: 0
HOW LIKELY ARE YOU TO NOD OFF OR FALL ASLEEP WHILE WATCHING TV: 0
ESS TOTAL SCORE: 0
HOW LIKELY ARE YOU TO NOD OFF OR FALL ASLEEP WHILE SITTING INACTIVE IN A PUBLIC PLACE: 0
HOW LIKELY ARE YOU TO NOD OFF OR FALL ASLEEP WHEN YOU ARE A PASSENGER IN A CAR FOR AN HOUR WITHOUT A BREAK: 0
HOW LIKELY ARE YOU TO NOD OFF OR FALL ASLEEP WHILE LYING DOWN TO REST IN THE AFTERNOON WHEN CIRCUMSTANCES PERMIT: 0
HOW LIKELY ARE YOU TO NOD OFF OR FALL ASLEEP WHILE SITTING AND READING: 0

## 2024-02-28 NOTE — PROGRESS NOTES
months.      Hakeem Lr MD, [facility]; NPI: [id number]    Electronically signed. Date:- 2/28/2024       * Counseling was provided regarding the importance of regular PAP use with emphasis on ensuring sufficient total sleep time, proper sleep hygiene, and safe driving.    *Sleep testing ordered due to elevated AHI on download on some nights and to re-qualify for continued therapy.    * Re-enforced proper and regular cleaning for the device.    * She was asked to contact our office for any problems regarding PAP therapy.    2. Anxiety and Depression-  continue on current regimen, she will continue to monitor her mood and follow up with her care provider for reevaluation/adjustment of medications if warranted.  I have reviewed the relationship between mood as it relates to sleep-disordered breathing.    3. Hypertension -  continue on current regimen, she will continue to monitor her BP and follow up with her primary care provider for reevaluation/adjustment of medications if warranted.  I have reviewed the relationship between hypertension as it relates to sleep-disordered breathing.    4. Recommended a dedicated weight loss program through appropriate diet and exercise regimen as significant weight reduction has been shown to reduce severity of obstructive sleep apnea.     Return for follow-up after testing is completed.      SUBJECTIVE/OBJECTIVE:    She  is seen today for follow up on PAP device and reports no problems using the device.   The following concerns identified:    Drowsiness no Problems exhaling no   Snoring no Forget to put on no   Mask Comfortable yes Can't fall asleep no   Dry Mouth no Mask falls off no   Air Leaking no Frequent awakenings no       She admits that her sleep has improved on PAP therapy using nasal mask and non-heated tubing.     Review of device download indicated:    Days with Usage >= 4 hours 28.9 %  Average Usage (on days used)  4 hour 16 minutes    Average P95 8.3 cmH2O    Average

## 2024-02-28 NOTE — PATIENT INSTRUCTIONS
[address]  [place] [address]  Tel.  [phone]  Fax. [phone] [address]  [place] [address]  Tel.  [phone]  Fax. [phone] [address] [place] [address]  Tel.  [phone]  Fax. [phone]     Learning About CPAP for Sleep Apnea  What is CPAP?              CPAP is a small machine that you use at home every night while you sleep. It increases air pressure in your throat to keep your airway open. When you have sleep apnea, this can help you sleep better so you feel much better. CPAP stands for \"continuous positive airway pressure.\"  The CPAP machine will have one of the following:  A mask that covers your nose and mouth  Prongs that fit into your nose  A mask that covers your nose only, the most common type. This type is called NCPAP. The N stands for \"nasal.\"  Why is it done?  CPAP is usually the best treatment for obstructive sleep apnea. It is the first treatment choice and the most widely used. Your doctor may suggest CPAP if you have:  Moderate to severe sleep apnea.  Sleep apnea and coronary artery disease (CAD) or heart failure.  How does it help?  CPAP can help you have more normal sleep, so you feel less sleepy and more alert during the daytime.  CPAP may help keep heart failure or other heart problems from getting worse.  NCPAP may help lower your blood pressure.  If you use CPAP, your bed partner may also sleep better because you are not snoring or restless.  What are the side effects?  Some people who use CPAP have:  A dry or stuffy nose and a sore throat.  Irritated skin on the face.  Sore eyes.  Bloating.  If you have any of these problems, work with your doctor to fix them. Here are some things you can try:  Be sure the mask or nasal prongs fit well.  See if your doctor can adjust the pressure of your CPAP.  If your nose is dry, try a humidifier.  If your nose is runny or stuffy, try decongestant medicine or a steroid

## 2024-03-04 DIAGNOSIS — F51.01 PRIMARY INSOMNIA: ICD-10-CM

## 2024-03-04 RX ORDER — TRAZODONE HYDROCHLORIDE 50 MG/1
TABLET ORAL
Qty: 90 TABLET | Refills: 0 | Status: SHIPPED | OUTPATIENT
Start: 2024-03-04

## 2024-03-04 NOTE — TELEPHONE ENCOUNTER
Pharmacy is requesting a 90 d/s    Last appointment: 12/19/23  Next appointment: 3/19/24    Requested Prescriptions     Pending Prescriptions Disp Refills    traZODone (DESYREL) 50 MG tablet 90 tablet 0     Sig: TAKE 1/2-1 TABLET BY MOUTH NIGHTLY AS NEEDED FOR SLEEP         For Pharmacy Admin Tracking Only    Program: Medication Refill  CPA in place:    Recommendation Provided To:   Intervention Detail: New Rx: 1, reason: Patient Preference  Intervention Accepted By:   Gap Closed?:    Time Spent (min): 5

## 2024-03-08 RX ORDER — IBUPROFEN 800 MG/1
TABLET ORAL
Qty: 90 TABLET | Refills: 1 | Status: SHIPPED | OUTPATIENT
Start: 2024-03-08

## 2024-03-08 NOTE — TELEPHONE ENCOUNTER
Last appointment: 12/19/23  Next appointment: 3/19/24  Previous refill encounter(s): 2/20/23    Requested Prescriptions     Pending Prescriptions Disp Refills    ibuprofen (ADVIL;MOTRIN) 800 MG tablet [Pharmacy Med Name: IBUPROFEN 800 MG TABLET] 90 tablet 1     Sig: TAKE 1 TABLET BY MOUTH EVERY 8 HOURS AS NEEDED FOR PAIN         For Pharmacy Admin Tracking Only    Program: Medication Refill  CPA in place:    Recommendation Provided To:   Intervention Detail: New Rx: 1, reason: Patient Preference  Intervention Accepted By:   Gap Closed?:    Time Spent (min): 5

## 2024-03-19 ENCOUNTER — OFFICE VISIT (OUTPATIENT)
Age: 64
End: 2024-03-19
Payer: COMMERCIAL

## 2024-03-19 VITALS
BODY MASS INDEX: 29.19 KG/M2 | TEMPERATURE: 98.8 F | OXYGEN SATURATION: 98 % | HEIGHT: 62 IN | DIASTOLIC BLOOD PRESSURE: 74 MMHG | WEIGHT: 158.6 LBS | RESPIRATION RATE: 18 BRPM | HEART RATE: 72 BPM | SYSTOLIC BLOOD PRESSURE: 116 MMHG

## 2024-03-19 DIAGNOSIS — E78.2 MIXED HYPERLIPIDEMIA: ICD-10-CM

## 2024-03-19 DIAGNOSIS — K21.9 GASTRO-ESOPHAGEAL REFLUX DISEASE WITHOUT ESOPHAGITIS: ICD-10-CM

## 2024-03-19 DIAGNOSIS — I10 ESSENTIAL (PRIMARY) HYPERTENSION: Primary | ICD-10-CM

## 2024-03-19 DIAGNOSIS — G47.33 OSA ON CPAP: ICD-10-CM

## 2024-03-19 DIAGNOSIS — Z79.899 ENCOUNTER FOR LONG-TERM (CURRENT) USE OF MEDICATIONS: ICD-10-CM

## 2024-03-19 DIAGNOSIS — R73.02 IMPAIRED GLUCOSE TOLERANCE (ORAL): ICD-10-CM

## 2024-03-19 DIAGNOSIS — F43.23 ADJUSTMENT DISORDER WITH MIXED ANXIETY AND DEPRESSED MOOD: ICD-10-CM

## 2024-03-19 LAB
GLUCOSE, POC: 140 MG/DL
HBA1C MFR BLD: 6.3 %

## 2024-03-19 PROCEDURE — 3078F DIAST BP <80 MM HG: CPT | Performed by: FAMILY MEDICINE

## 2024-03-19 PROCEDURE — 99214 OFFICE O/P EST MOD 30 MIN: CPT | Performed by: FAMILY MEDICINE

## 2024-03-19 PROCEDURE — 3074F SYST BP LT 130 MM HG: CPT | Performed by: FAMILY MEDICINE

## 2024-03-19 PROCEDURE — 82962 GLUCOSE BLOOD TEST: CPT | Performed by: FAMILY MEDICINE

## 2024-03-19 PROCEDURE — 83036 HEMOGLOBIN GLYCOSYLATED A1C: CPT | Performed by: FAMILY MEDICINE

## 2024-03-19 ASSESSMENT — PATIENT HEALTH QUESTIONNAIRE - PHQ9
3. TROUBLE FALLING OR STAYING ASLEEP: NOT AT ALL
8. MOVING OR SPEAKING SO SLOWLY THAT OTHER PEOPLE COULD HAVE NOTICED. OR THE OPPOSITE, BEING SO FIGETY OR RESTLESS THAT YOU HAVE BEEN MOVING AROUND A LOT MORE THAN USUAL: NOT AT ALL
6. FEELING BAD ABOUT YOURSELF - OR THAT YOU ARE A FAILURE OR HAVE LET YOURSELF OR YOUR FAMILY DOWN: NOT AT ALL
SUM OF ALL RESPONSES TO PHQ QUESTIONS 1-9: 1
SUM OF ALL RESPONSES TO PHQ QUESTIONS 1-9: 1
9. THOUGHTS THAT YOU WOULD BE BETTER OFF DEAD, OR OF HURTING YOURSELF: NOT AT ALL
5. POOR APPETITE OR OVEREATING: NOT AT ALL
SUM OF ALL RESPONSES TO PHQ QUESTIONS 1-9: 1
4. FEELING TIRED OR HAVING LITTLE ENERGY: NOT AT ALL
SUM OF ALL RESPONSES TO PHQ QUESTIONS 1-9: 1
SUM OF ALL RESPONSES TO PHQ9 QUESTIONS 1 & 2: 1
2. FEELING DOWN, DEPRESSED OR HOPELESS: SEVERAL DAYS
7. TROUBLE CONCENTRATING ON THINGS, SUCH AS READING THE NEWSPAPER OR WATCHING TELEVISION: NOT AT ALL
1. LITTLE INTEREST OR PLEASURE IN DOING THINGS: NOT AT ALL

## 2024-03-19 ASSESSMENT — ENCOUNTER SYMPTOMS
CHEST TIGHTNESS: 0
RHINORRHEA: 0
ABDOMINAL PAIN: 0
SHORTNESS OF BREATH: 0
CONSTIPATION: 0
COUGH: 0
BLOOD IN STOOL: 0

## 2024-03-19 NOTE — PROGRESS NOTES
Subjective:      Patient ID: Anabelle Myrick is a 63 y.o. female.    HPI  Follow up on chronic medical problems.  Overall feeling well.    HTN follow up:  Pt has HTN and FELTON.  She is getting titrated with her CPAP.  Compliant w/ meds, low salt diet, and exercise.  No home bp monitoring.  No swelling, headache or dizziness.  No chest pain, SOB, palpitations.  Otherwise feeling well since the last visit.  Glucose intolerance reveiw:  She has IGT.   She did not want to start on medication and has been working on her diet to control her BS.  She has been trying to eat healthier.  Has been trying to get in more exercise.  Seeing a nutritionist to help with diet.      Diabetic ROS - further diabetic ROS: no polyuria or polydipsia, no chest pain, dyspnea or TIA's, no numbness, tingling or pain in extremities, no unusual visual symptoms.   Lab review: orders written for new lab studies as appropriate; see orders    Depression Review:  Patient is seen for followup of depression. Treatment includes Lexapro.  Overall she has been good for the last several months.  Attending a grief support group.  Mood has been overall good.    Sleeping better on most nights.  No SI/HI.       She denies hopelessness.   She experiences the following side effects from the treatment: none.  HM:  Mammo: 1/13/23  Colonoscopy: 7/6/2023      Past Medical History:   Diagnosis Date    HTN (hypertension) 3/26/2010    Insomnia      Past Surgical History:   Procedure Laterality Date    COLONOSCOPY      GYN  2006    fibroids removed from uterus    TUBAL LIGATION  1993     Current Outpatient Medications   Medication Sig Dispense Refill    ibuprofen (ADVIL;MOTRIN) 800 MG tablet TAKE 1 TABLET BY MOUTH EVERY 8 HOURS AS NEEDED FOR PAIN 90 tablet 1    traZODone (DESYREL) 50 MG tablet TAKE 1/2-1 TABLET BY MOUTH NIGHTLY AS NEEDED FOR SLEEP 90 tablet 0    atorvastatin (LIPITOR) 10 MG tablet TAKE 1 TABLET BY MOUTH EVERY DAY 90 tablet 3    potassium chloride

## 2024-03-19 NOTE — PROGRESS NOTES
Chief Complaint   Patient presents with    3 Month Follow-Up       \"Have you been to the ER, urgent care clinic since your last visit?  Hospitalized since your last visit?\"    NO    “Have you seen or consulted any other health care providers outside of [facility] since your last visit?”    NO       Have you had a mammogram?”   Patient states she had a mammogram in , mammogram is not due at this time.    Date of last Mammogram: 2021           Vitals:    24 0931   BP: 116/74   Pulse: 72   Resp: 18   Temp: 98.8 °F (37.1 °C)   SpO2: 98%       Health Maintenance Due   Topic Date Due    Respiratory Syncytial Virus (RSV) Pregnant or age 60 yrs+ (1 - 1-dose 60+ series) Never done    COVID-19 Vaccine ( season) 2023    Breast cancer screen  2023    A1C test (Diabetic or Prediabetic)  2024        The patient, Anabelle Myrick, identity was verified by name and .

## 2024-03-20 ENCOUNTER — TELEPHONE (OUTPATIENT)
Age: 64
End: 2024-03-20

## 2024-03-20 ENCOUNTER — HOSPITAL ENCOUNTER (OUTPATIENT)
Facility: HOSPITAL | Age: 64
Discharge: HOME | End: 2024-03-23
Payer: COMMERCIAL

## 2024-03-20 DIAGNOSIS — G47.33 OSA (OBSTRUCTIVE SLEEP APNEA): ICD-10-CM

## 2024-03-20 PROCEDURE — 95811 POLYSOM 6/>YRS CPAP 4/> PARM: CPT | Performed by: INTERNAL MEDICINE

## 2024-03-20 NOTE — TELEPHONE ENCOUNTER
Verified eligibility and benefits for sleep study scheduled tonight. Member has 0% co insurance with a $200 out patient outpatient co payment (per call to [place] [employer]- call ref# [id number]).     Left message to collect. Explained that if patient doesn't return call to pay copay, its still okay to do the sleep study tonight, we will simply bill them after the fact.     Return call can be forwarded to my back line for assistance [phone].

## 2024-03-21 VITALS
WEIGHT: 158 LBS | DIASTOLIC BLOOD PRESSURE: 82 MMHG | OXYGEN SATURATION: 95 % | SYSTOLIC BLOOD PRESSURE: 132 MMHG | HEIGHT: 62 IN | BODY MASS INDEX: 29.08 KG/M2 | HEART RATE: 74 BPM | TEMPERATURE: 98.5 F

## 2024-03-21 NOTE — PROGRESS NOTES
Sleep Study Technical Notes   Disclaimer:  all notes have not been confirmed by interpreting physician      PRE-Test:  Anabelle Myrick (: 1960) arrived in the lobby. The patient was taken to the Sleep Center and taken directly to his/her room.   Procedure explained to the patient and questions were answered. The patient expressed understanding of the procedure. Electrodes were applied without incident. The patient was placed in bed and the study was started.    Acquisition Notes:  Lights off:     Respiratory events:   A__yesY/N    H__noY/N  C__noY/N  M__noY/N  ECG:    Significant arrhythmias:   Ryley/N  Snoring:  no   Sleep Stages:  REM   Sergo/N    Titration type:CPAP   PAP titration information in study flow sheet if applicable  F&P Jaimie Nasal Mask Medium Pt would do better with a Med Wide         POST Test:  Patient was awakened.  Electrodes were removed.    The patient was discharged after answering the Post Questionnaire.    Equipment and room cleaned per infection control policy.

## 2024-03-22 ENCOUNTER — CLINICAL DOCUMENTATION (OUTPATIENT)
Age: 64
End: 2024-03-22

## 2024-03-22 ENCOUNTER — TELEPHONE (OUTPATIENT)
Age: 64
End: 2024-03-22

## 2024-03-22 NOTE — TELEPHONE ENCOUNTER
----- Message from Shauna Jens sent at 3/21/2024  5:01 PM EDT -----  Subject: Message to Provider    QUESTIONS  Information for Provider? Pt is asking if she can pick her forms up from   the office tomorrow on 3/22. Please advise  ---------------------------------------------------------------------------  --------------  CALL BACK INFO  [phone]; OK to leave message on voicemail  ---------------------------------------------------------------------------  --------------  SCRIPT ANSWERS  Relationship to Patient? Self

## 2024-03-23 ENCOUNTER — TELEPHONE (OUTPATIENT)
Facility: HOSPITAL | Age: 64
End: 2024-03-23

## 2024-03-23 DIAGNOSIS — G47.33 OSA (OBSTRUCTIVE SLEEP APNEA): Primary | ICD-10-CM

## 2024-03-27 NOTE — TELEPHONE ENCOUNTER
Titration study interpreted.     * Device pressure change to CPAP  8 - 12 cmH2O by lead technologist either remotely or at PAP clinic (notify patient).    * PAP card download in 4 weeks.     * Office visit in 3 months or as needed.

## 2024-03-28 NOTE — TELEPHONE ENCOUNTER
Reviewed sleep study results.  Patient scheduled for tech appt on 4/1/24 @ 3pm @ [facility] (closer to her house); DL needed in 4 weeks and will need to be a tech visit.

## 2024-04-02 ENCOUNTER — PROCEDURE VISIT (OUTPATIENT)
Age: 64
End: 2024-04-02

## 2024-04-02 DIAGNOSIS — G47.33 OSA (OBSTRUCTIVE SLEEP APNEA): Primary | ICD-10-CM

## 2024-04-02 NOTE — PROGRESS NOTES
A 30 Download was completed for Ms. Myrick and scanned into her chart. APAP pressures changed to 8-12cm per Dr. Lr.

## 2024-06-12 RX ORDER — ESCITALOPRAM OXALATE 10 MG/1
15 TABLET ORAL DAILY
Qty: 135 TABLET | Refills: 3 | Status: SHIPPED | OUTPATIENT
Start: 2024-06-12

## 2024-06-12 NOTE — TELEPHONE ENCOUNTER
Last appointment: 3/19/24  Next appointment: 8/19/24  Previous refill encounter(s): 5/24/23 #135 with 3 refills    Requested Prescriptions     Pending Prescriptions Disp Refills    escitalopram (LEXAPRO) 10 MG tablet [Pharmacy Med Name: ESCITALOPRAM 10 MG TABLET] 135 tablet 3     Sig: TAKE 1 AND 1/2 TABLETS BY MOUTH EVERY DAY         For Pharmacy Admin Tracking Only    Program: Medication Refill  CPA in place:    Recommendation Provided To:   Intervention Detail: New Rx: 1, reason: Patient Preference  Intervention Accepted By:   Gap Closed?:    Time Spent (min): 5

## 2024-07-30 RX ORDER — AMLODIPINE BESYLATE 5 MG/1
5 TABLET ORAL EVERY MORNING
Qty: 90 TABLET | Refills: 3 | Status: SHIPPED | OUTPATIENT
Start: 2024-07-30

## 2024-07-30 NOTE — TELEPHONE ENCOUNTER
Last appointment: 3/19/24  Next appointment: 8/19/24  Previous refill encounter(s): 7/24/23    Requested Prescriptions     Pending Prescriptions Disp Refills    amLODIPine (NORVASC) 5 MG tablet [Pharmacy Med Name: AMLODIPINE BESYLATE 5 MG TAB] 90 tablet 3     Sig: TAKE 1 TABLET BY MOUTH EVERY MORNING         For Pharmacy Admin Tracking Only    Program: Medication Refill  CPA in place:    Recommendation Provided To:   Intervention Detail: New Rx: 1, reason: Patient Preference  Intervention Accepted By:   Gap Closed?:    Time Spent (min): 5

## 2024-08-05 NOTE — TELEPHONE ENCOUNTER
Last appointment: 3/19/24  Next appointment: 8/19/24  Previous refill encounter(s): 8/9/23    Requested Prescriptions     Pending Prescriptions Disp Refills    olmesartan-hydroCHLOROthiazide (BENICAR HCT) 40-25 MG per tablet [Pharmacy Med Name: OLMESARTAN-HCTZ 40-25 MG TAB] 90 tablet 3     Sig: TAKE 1 TABLET BY MOUTH EVERY DAY         For Pharmacy Admin Tracking Only    Program: Medication Refill  CPA in place:    Recommendation Provided To:   Intervention Detail: New Rx: 1, reason: Patient Preference  Intervention Accepted By:   Gap Closed?:    Time Spent (min): 5

## 2024-08-06 RX ORDER — OLMESARTAN MEDOXOMIL AND HYDROCHLOROTHIAZIDE 40/25 40; 25 MG/1; MG/1
TABLET ORAL
Qty: 30 TABLET | Refills: 0 | Status: SHIPPED | OUTPATIENT
Start: 2024-08-06

## 2024-08-19 ENCOUNTER — OFFICE VISIT (OUTPATIENT)
Age: 64
End: 2024-08-19
Payer: COMMERCIAL

## 2024-08-19 VITALS
TEMPERATURE: 97.2 F | DIASTOLIC BLOOD PRESSURE: 68 MMHG | RESPIRATION RATE: 16 BRPM | WEIGHT: 155 LBS | BODY MASS INDEX: 28.35 KG/M2 | HEART RATE: 68 BPM | OXYGEN SATURATION: 98 % | SYSTOLIC BLOOD PRESSURE: 120 MMHG

## 2024-08-19 DIAGNOSIS — F51.01 PRIMARY INSOMNIA: ICD-10-CM

## 2024-08-19 DIAGNOSIS — K21.9 GASTRO-ESOPHAGEAL REFLUX DISEASE WITHOUT ESOPHAGITIS: ICD-10-CM

## 2024-08-19 DIAGNOSIS — Z79.899 ENCOUNTER FOR LONG-TERM (CURRENT) USE OF MEDICATIONS: ICD-10-CM

## 2024-08-19 DIAGNOSIS — R73.02 IMPAIRED GLUCOSE TOLERANCE (ORAL): ICD-10-CM

## 2024-08-19 DIAGNOSIS — E78.2 MIXED HYPERLIPIDEMIA: ICD-10-CM

## 2024-08-19 DIAGNOSIS — G47.33 OSA ON CPAP: ICD-10-CM

## 2024-08-19 DIAGNOSIS — I10 ESSENTIAL (PRIMARY) HYPERTENSION: Primary | ICD-10-CM

## 2024-08-19 DIAGNOSIS — F43.23 ADJUSTMENT DISORDER WITH MIXED ANXIETY AND DEPRESSED MOOD: ICD-10-CM

## 2024-08-19 LAB
ALBUMIN SERPL-MCNC: 4.3 G/DL (ref 3.5–5)
ALBUMIN/GLOB SERPL: 1.4 (ref 1.1–2.2)
ALP SERPL-CCNC: 73 U/L (ref 45–117)
ALT SERPL-CCNC: 24 U/L (ref 12–78)
ANION GAP SERPL CALC-SCNC: 4 MMOL/L (ref 5–15)
AST SERPL-CCNC: 19 U/L (ref 15–37)
BILIRUB SERPL-MCNC: 0.3 MG/DL (ref 0.2–1)
BUN SERPL-MCNC: 15 MG/DL (ref 6–20)
BUN/CREAT SERPL: 19 (ref 12–20)
CALCIUM SERPL-MCNC: 10.2 MG/DL (ref 8.5–10.1)
CHLORIDE SERPL-SCNC: 101 MMOL/L (ref 97–108)
CHOLEST SERPL-MCNC: 159 MG/DL
CO2 SERPL-SCNC: 32 MMOL/L (ref 21–32)
CREAT SERPL-MCNC: 0.81 MG/DL (ref 0.55–1.02)
GLOBULIN SER CALC-MCNC: 3.1 G/DL (ref 2–4)
GLUCOSE SERPL-MCNC: 125 MG/DL (ref 65–100)
GLUCOSE, POC: 122 MG/DL
HBA1C MFR BLD: 6.1 %
HDLC SERPL-MCNC: 54 MG/DL
HDLC SERPL: 2.9 (ref 0–5)
LDLC SERPL CALC-MCNC: 89 MG/DL (ref 0–100)
POTASSIUM SERPL-SCNC: 3.9 MMOL/L (ref 3.5–5.1)
PROT SERPL-MCNC: 7.4 G/DL (ref 6.4–8.2)
SODIUM SERPL-SCNC: 137 MMOL/L (ref 136–145)
TRIGL SERPL-MCNC: 80 MG/DL
VLDLC SERPL CALC-MCNC: 16 MG/DL

## 2024-08-19 PROCEDURE — 3074F SYST BP LT 130 MM HG: CPT | Performed by: FAMILY MEDICINE

## 2024-08-19 PROCEDURE — 99214 OFFICE O/P EST MOD 30 MIN: CPT | Performed by: FAMILY MEDICINE

## 2024-08-19 PROCEDURE — 83036 HEMOGLOBIN GLYCOSYLATED A1C: CPT | Performed by: FAMILY MEDICINE

## 2024-08-19 PROCEDURE — 3078F DIAST BP <80 MM HG: CPT | Performed by: FAMILY MEDICINE

## 2024-08-19 PROCEDURE — 82962 GLUCOSE BLOOD TEST: CPT | Performed by: FAMILY MEDICINE

## 2024-08-19 ASSESSMENT — PATIENT HEALTH QUESTIONNAIRE - PHQ9
9. THOUGHTS THAT YOU WOULD BE BETTER OFF DEAD, OR OF HURTING YOURSELF: NOT AT ALL
5. POOR APPETITE OR OVEREATING: NOT AT ALL
SUM OF ALL RESPONSES TO PHQ QUESTIONS 1-9: 0
4. FEELING TIRED OR HAVING LITTLE ENERGY: NOT AT ALL
SUM OF ALL RESPONSES TO PHQ QUESTIONS 1-9: 0
1. LITTLE INTEREST OR PLEASURE IN DOING THINGS: NOT AT ALL
SUM OF ALL RESPONSES TO PHQ9 QUESTIONS 1 & 2: 0
SUM OF ALL RESPONSES TO PHQ QUESTIONS 1-9: 0
2. FEELING DOWN, DEPRESSED OR HOPELESS: NOT AT ALL
7. TROUBLE CONCENTRATING ON THINGS, SUCH AS READING THE NEWSPAPER OR WATCHING TELEVISION: NOT AT ALL
SUM OF ALL RESPONSES TO PHQ QUESTIONS 1-9: 0
8. MOVING OR SPEAKING SO SLOWLY THAT OTHER PEOPLE COULD HAVE NOTICED. OR THE OPPOSITE, BEING SO FIGETY OR RESTLESS THAT YOU HAVE BEEN MOVING AROUND A LOT MORE THAN USUAL: NOT AT ALL
3. TROUBLE FALLING OR STAYING ASLEEP: NOT AT ALL
6. FEELING BAD ABOUT YOURSELF - OR THAT YOU ARE A FAILURE OR HAVE LET YOURSELF OR YOUR FAMILY DOWN: NOT AT ALL
10. IF YOU CHECKED OFF ANY PROBLEMS, HOW DIFFICULT HAVE THESE PROBLEMS MADE IT FOR YOU TO DO YOUR WORK, TAKE CARE OF THINGS AT HOME, OR GET ALONG WITH OTHER PEOPLE: NOT DIFFICULT AT ALL

## 2024-08-19 ASSESSMENT — ENCOUNTER SYMPTOMS
RHINORRHEA: 0
SHORTNESS OF BREATH: 0
ABDOMINAL PAIN: 0
CONSTIPATION: 0
CHEST TIGHTNESS: 0
COUGH: 0
BLOOD IN STOOL: 0

## 2024-08-19 NOTE — PROGRESS NOTES
Chief Complaint   Patient presents with    5 month follow up       \"Have you been to the ER, urgent care clinic since your last visit?  Hospitalized since your last visit?\"    NO    “Have you seen or consulted any other health care providers outside of [facility] since your last visit?”    NO            Click Here for Release of Records Request       There were no vitals filed for this visit.   Health Maintenance Due   Topic Date Due    A1C test (Diabetic or Prediabetic)  2024    Flu vaccine (1) 2024        The patient, Anabelle Myrick, identity was verified by name and .

## 2024-08-19 NOTE — PROGRESS NOTES
Subjective:      Patient ID: Anabelle Myrick is a 64 y.o. female.    HPI  Follow up on chronic medical problems.  No new issues noted today.    HTN follow up:  Pt has HTN and FELTON.  Tolerating her CPAP.    Compliant w/ meds, low salt diet, and exercise.  No home bp monitoring.  No swelling, headache or dizziness.  No chest pain, SOB, palpitations.  Otherwise feeling well since the last visit.  Glucose intolerance reveiw:  She has IGT.   She did not want to start on medication and has been working on her diet to control her BS.  She has been trying to eat healthier.  Has been trying to get in more exercise.  Seeing a nutritionist to help with diet.      Diabetic ROS - further diabetic ROS: no polyuria or polydipsia, no chest pain, dyspnea or TIA's, no numbness, tingling or pain in extremities, no unusual visual symptoms.   Lab review: orders written for new lab studies as appropriate; see orders    Depression Review:  Patient is seen for followup of depression. Treatment includes Lexapro.  Overall she has been good for the last several months.  Attending a grief support group.  Mood has been overall good.    Sleeping better on most nights.  No SI/HI.       She denies hopelessness.   She experiences the following side effects from the treatment: none.  HM:  Mammo: 1/13/23  Colonoscopy: 7/6/2023      Past Medical History:   Diagnosis Date    HTN (hypertension) 3/26/2010    Insomnia      Past Surgical History:   Procedure Laterality Date    COLONOSCOPY      GYN  2006    fibroids removed from uterus    TUBAL LIGATION  1993     Current Outpatient Medications   Medication Sig Dispense Refill    olmesartan-hydroCHLOROthiazide (BENICAR HCT) 40-25 MG per tablet TAKE 1 TABLET BY MOUTH EVERY DAY 30 tablet 0    amLODIPine (NORVASC) 5 MG tablet TAKE 1 TABLET BY MOUTH EVERY MORNING 90 tablet 3    escitalopram (LEXAPRO) 10 MG tablet TAKE 1 AND 1/2 TABLETS BY MOUTH EVERY  tablet 3    ibuprofen (ADVIL;MOTRIN) 800 MG tablet

## 2024-09-25 DIAGNOSIS — F51.01 PRIMARY INSOMNIA: ICD-10-CM

## 2024-09-25 RX ORDER — TRAZODONE HYDROCHLORIDE 50 MG/1
TABLET, FILM COATED ORAL
Qty: 90 TABLET | Refills: 3 | Status: SHIPPED | OUTPATIENT
Start: 2024-09-25

## 2024-11-13 ENCOUNTER — IMMUNIZATION (OUTPATIENT)
Age: 64
End: 2024-11-13

## 2024-11-13 VITALS
SYSTOLIC BLOOD PRESSURE: 137 MMHG | DIASTOLIC BLOOD PRESSURE: 72 MMHG | OXYGEN SATURATION: 97 % | RESPIRATION RATE: 16 BRPM | HEART RATE: 73 BPM | TEMPERATURE: 98.3 F

## 2024-11-13 DIAGNOSIS — Z23 ENCOUNTER FOR IMMUNIZATION: Primary | ICD-10-CM

## 2024-11-13 NOTE — PROGRESS NOTES
After obtaining consent, and per orders of Dr. FONTANA, injection of FLU VACCINE given in Right deltoid by Graeme Bernal MA. Patient instructed to remain in clinic for 20 minutes afterwards, and to report any adverse reaction to me immediately.

## 2024-11-26 RX ORDER — OLMESARTAN MEDOXOMIL AND HYDROCHLOROTHIAZIDE 40/25 40; 25 MG/1; MG/1
TABLET ORAL
Qty: 90 TABLET | Refills: 3 | Status: SHIPPED | OUTPATIENT
Start: 2024-11-26

## 2024-11-26 RX ORDER — ESCITALOPRAM OXALATE 10 MG/1
TABLET ORAL
Qty: 135 TABLET | Refills: 3 | OUTPATIENT
Start: 2024-11-26

## 2024-11-26 NOTE — TELEPHONE ENCOUNTER
Last appointment: 8/19/24  Next appointment: 1/20/25  Previous refill encounter(s): 8/6/24 #30    Requested Prescriptions     Pending Prescriptions Disp Refills    olmesartan-hydroCHLOROthiazide (BENICAR HCT) 40-25 MG per tablet [Pharmacy Med Name: OLMESARTAN-HCTZ 40-25 MG TAB] 90 tablet 3     Sig: TAKE 1 TABLET BY MOUTH EVERY DAY         For Pharmacy Admin Tracking Only    Program: Medication Refill  CPA in place:    Recommendation Provided To:   Intervention Detail: New Rx: 1, reason: Patient Preference  Intervention Accepted By:   Gap Closed?:    Time Spent (min): 5

## 2024-11-26 NOTE — TELEPHONE ENCOUNTER
Lexapro was sent on 6/12/24 for a 90 d/s with 3 refills    For Pharmacy Admin Tracking Only    Program: Medication Refill  CPA in place:    Recommendation Provided To:   Intervention Detail: Discontinued Rx: 1, reason: Duplicate Therapy  Intervention Accepted By:   Gap Closed?:    Time Spent (min): 5

## 2024-12-23 RX ORDER — OMEPRAZOLE 40 MG/1
CAPSULE, DELAYED RELEASE ORAL
Qty: 90 CAPSULE | Refills: 3 | Status: SHIPPED | OUTPATIENT
Start: 2024-12-23

## 2024-12-23 RX ORDER — POTASSIUM CHLORIDE 750 MG/1
10 TABLET, EXTENDED RELEASE ORAL DAILY
Qty: 90 TABLET | Refills: 3 | Status: SHIPPED | OUTPATIENT
Start: 2024-12-23

## 2024-12-23 NOTE — TELEPHONE ENCOUNTER
Last appointment: 8/19/24  Next appointment: 1/20/25  Previous refill encounter(s): 11/6/23    Requested Prescriptions     Pending Prescriptions Disp Refills    potassium chloride (KLOR-CON) 10 MEQ extended release tablet [Pharmacy Med Name: POTASSIUM CL ER 10 MEQ TABLET] 90 tablet 3     Sig: TAKE 1 TABLET BY MOUTH EVERY DAY         For Pharmacy Admin Tracking Only    Program: Medication Refill  CPA in place:    Recommendation Provided To:   Intervention Detail: New Rx: 1, reason: Patient Preference  Intervention Accepted By:   Gap Closed?:    Time Spent (min): 5

## 2024-12-23 NOTE — TELEPHONE ENCOUNTER
Last appointment: 8/19/24  Next appointment: 1/20/25  Previous refill encounter(s): 10/18/23    Requested Prescriptions     Pending Prescriptions Disp Refills    omeprazole (PRILOSEC) 40 MG delayed release capsule [Pharmacy Med Name: OMEPRAZOLE DR 40 MG CAPSULE] 90 capsule 3     Sig: TAKE 1 CAPSULE BY MOUTH EVERY DAY         For Pharmacy Admin Tracking Only    Program: Medication Refill  CPA in place:    Recommendation Provided To:   Intervention Detail: New Rx: 1, reason: Patient Preference  Intervention Accepted By:   Gap Closed?:    Time Spent (min): 5

## 2025-02-11 ENCOUNTER — OFFICE VISIT (OUTPATIENT)
Age: 65
End: 2025-02-11
Payer: COMMERCIAL

## 2025-02-11 VITALS
SYSTOLIC BLOOD PRESSURE: 124 MMHG | HEART RATE: 75 BPM | OXYGEN SATURATION: 99 % | TEMPERATURE: 98.8 F | BODY MASS INDEX: 28.56 KG/M2 | DIASTOLIC BLOOD PRESSURE: 58 MMHG | RESPIRATION RATE: 18 BRPM | HEIGHT: 62 IN | WEIGHT: 155.2 LBS

## 2025-02-11 DIAGNOSIS — Z79.899 ENCOUNTER FOR LONG-TERM (CURRENT) USE OF MEDICATIONS: ICD-10-CM

## 2025-02-11 DIAGNOSIS — G47.33 OSA ON CPAP: ICD-10-CM

## 2025-02-11 DIAGNOSIS — F43.23 ADJUSTMENT DISORDER WITH MIXED ANXIETY AND DEPRESSED MOOD: ICD-10-CM

## 2025-02-11 DIAGNOSIS — R73.02 IMPAIRED GLUCOSE TOLERANCE (ORAL): ICD-10-CM

## 2025-02-11 DIAGNOSIS — I10 ESSENTIAL (PRIMARY) HYPERTENSION: Primary | ICD-10-CM

## 2025-02-11 DIAGNOSIS — E78.2 MIXED HYPERLIPIDEMIA: ICD-10-CM

## 2025-02-11 DIAGNOSIS — K21.9 GASTRO-ESOPHAGEAL REFLUX DISEASE WITHOUT ESOPHAGITIS: ICD-10-CM

## 2025-02-11 LAB
GLUCOSE, POC: 146 MG/DL
HBA1C MFR BLD: 6.1 %

## 2025-02-11 PROCEDURE — 90471 IMMUNIZATION ADMIN: CPT | Performed by: FAMILY MEDICINE

## 2025-02-11 PROCEDURE — 83036 HEMOGLOBIN GLYCOSYLATED A1C: CPT | Performed by: FAMILY MEDICINE

## 2025-02-11 PROCEDURE — 3074F SYST BP LT 130 MM HG: CPT | Performed by: FAMILY MEDICINE

## 2025-02-11 PROCEDURE — 99214 OFFICE O/P EST MOD 30 MIN: CPT | Performed by: FAMILY MEDICINE

## 2025-02-11 PROCEDURE — 3078F DIAST BP <80 MM HG: CPT | Performed by: FAMILY MEDICINE

## 2025-02-11 PROCEDURE — 82962 GLUCOSE BLOOD TEST: CPT | Performed by: FAMILY MEDICINE

## 2025-02-11 PROCEDURE — 90677 PCV20 VACCINE IM: CPT | Performed by: FAMILY MEDICINE

## 2025-02-11 SDOH — ECONOMIC STABILITY: FOOD INSECURITY: WITHIN THE PAST 12 MONTHS, THE FOOD YOU BOUGHT JUST DIDN'T LAST AND YOU DIDN'T HAVE MONEY TO GET MORE.: NEVER TRUE

## 2025-02-11 SDOH — ECONOMIC STABILITY: FOOD INSECURITY: WITHIN THE PAST 12 MONTHS, YOU WORRIED THAT YOUR FOOD WOULD RUN OUT BEFORE YOU GOT MONEY TO BUY MORE.: NEVER TRUE

## 2025-02-11 ASSESSMENT — PATIENT HEALTH QUESTIONNAIRE - PHQ9
9. THOUGHTS THAT YOU WOULD BE BETTER OFF DEAD, OR OF HURTING YOURSELF: NOT AT ALL
7. TROUBLE CONCENTRATING ON THINGS, SUCH AS READING THE NEWSPAPER OR WATCHING TELEVISION: NOT AT ALL
SUM OF ALL RESPONSES TO PHQ QUESTIONS 1-9: 0
SUM OF ALL RESPONSES TO PHQ QUESTIONS 1-9: 0
4. FEELING TIRED OR HAVING LITTLE ENERGY: NOT AT ALL
SUM OF ALL RESPONSES TO PHQ9 QUESTIONS 1 & 2: 0
1. LITTLE INTEREST OR PLEASURE IN DOING THINGS: NOT AT ALL
3. TROUBLE FALLING OR STAYING ASLEEP: NOT AT ALL
8. MOVING OR SPEAKING SO SLOWLY THAT OTHER PEOPLE COULD HAVE NOTICED. OR THE OPPOSITE, BEING SO FIGETY OR RESTLESS THAT YOU HAVE BEEN MOVING AROUND A LOT MORE THAN USUAL: NOT AT ALL
SUM OF ALL RESPONSES TO PHQ QUESTIONS 1-9: 0
6. FEELING BAD ABOUT YOURSELF - OR THAT YOU ARE A FAILURE OR HAVE LET YOURSELF OR YOUR FAMILY DOWN: NOT AT ALL
5. POOR APPETITE OR OVEREATING: NOT AT ALL
SUM OF ALL RESPONSES TO PHQ QUESTIONS 1-9: 0
2. FEELING DOWN, DEPRESSED OR HOPELESS: NOT AT ALL

## 2025-02-11 ASSESSMENT — ENCOUNTER SYMPTOMS
RHINORRHEA: 0
ABDOMINAL PAIN: 0
CONSTIPATION: 0
COUGH: 0
CHEST TIGHTNESS: 0
BLOOD IN STOOL: 0
SHORTNESS OF BREATH: 0

## 2025-02-11 NOTE — PROGRESS NOTES
Subjective:      Patient ID: Anabelle Myrick is a 64 y.o. female.    HPI  Follow up on chronic medical problems.  No new issues noted today.    HTN follow up:  Pt has HTN and FELTON.  Tolerating her CPAP.    Compliant w/ meds, low salt diet, and exercise.  No home bp monitoring.  No swelling, headache or dizziness.  No chest pain, SOB, palpitations.  Otherwise feeling well since the last visit.  Glucose intolerance reveiw:  She has IGT.   She did not want to start on medication and has been working on her diet to control her BS.  She has been trying to eat healthier.  Has been trying to get in more exercise.  Seeing a nutritionist to help with diet.      Diabetic ROS - further diabetic ROS: no polyuria or polydipsia, no chest pain, dyspnea or TIA's, no numbness, tingling or pain in extremities, no unusual visual symptoms.   Lab review: orders written for new lab studies as appropriate; see orders    Depression Review:  Patient is seen for followup of depression. Treatment includes Lexapro.  Overall she has been good for the last several months.  Attending a grief support group.  Mood has been overall good.    Sleeping better on most nights.  No SI/HI.       She denies hopelessness.   She experiences the following side effects from the treatment: none.  HM:  Mammo: 1/13/23  Colonoscopy: 7/6/2023      Past Medical History:   Diagnosis Date    HTN (hypertension) 3/26/2010    Insomnia      Past Surgical History:   Procedure Laterality Date    COLONOSCOPY      GYN  2006    fibroids removed from uterus    TUBAL LIGATION  1993     Current Outpatient Medications   Medication Sig Dispense Refill    potassium chloride (KLOR-CON) 10 MEQ extended release tablet TAKE 1 TABLET BY MOUTH EVERY DAY 90 tablet 3    omeprazole (PRILOSEC) 40 MG delayed release capsule TAKE 1 CAPSULE BY MOUTH EVERY DAY 90 capsule 3    olmesartan-hydroCHLOROthiazide (BENICAR HCT) 40-25 MG per tablet TAKE 1 TABLET BY MOUTH EVERY DAY 90 tablet 3    traZODone

## 2025-02-11 NOTE — PROGRESS NOTES
Chief Complaint   Patient presents with    Follow-up     Patient is here today for a 5 month follow-up       \"Have you been to the ER, urgent care clinic since your last visit?  Hospitalized since your last visit?\"    NO    “Have you seen or consulted any other health care providers outside of [facility] since your last visit?”    NO  After obtaining consent, and per orders of Dr. Mckeon, injection of Prevnar-20 given in Right deltoid by Samantha Rosario LPN. Patient instructed to remain in clinic for 20 minutes afterwards, and to report any adverse reaction to me immediately.     Vitals:    25 0823 25 0830   BP: (!) 153/87 (!) 124/58   Site: Left Upper Arm Right Upper Arm   Position: Sitting Sitting   Cuff Size: Large Adult Large Adult   Pulse: 75    Resp: 18    Temp: 98.8 °F (37.1 °C)    TempSrc: Oral    SpO2: 99%    Weight: 70.4 kg (155 lb 3.2 oz)    Height: 1.575 m (5' 2\")           Click Here for Release of Records Request      There were no vitals filed for this visit.    Health Maintenance Due   Topic Date Due    Pneumococcal 50+ years Vaccine (1 of  - PCV) Never done    COVID-19 Vaccine (2024- season) 2024    A1C test (Diabetic or Prediabetic)  2024        The patient, Anabelle Myrick, identity was verified by name and .

## 2025-02-12 LAB
ALBUMIN SERPL-MCNC: 4.4 G/DL (ref 3.5–5)
ALBUMIN/GLOB SERPL: 1.3 (ref 1.1–2.2)
ALP SERPL-CCNC: 76 U/L (ref 45–117)
ALT SERPL-CCNC: 23 U/L (ref 12–78)
ANION GAP SERPL CALC-SCNC: 9 MMOL/L (ref 2–12)
AST SERPL-CCNC: 14 U/L (ref 15–37)
BILIRUB SERPL-MCNC: 0.4 MG/DL (ref 0.2–1)
BUN SERPL-MCNC: 16 MG/DL (ref 6–20)
BUN/CREAT SERPL: 18 (ref 12–20)
CALCIUM SERPL-MCNC: 9.9 MG/DL (ref 8.5–10.1)
CHLORIDE SERPL-SCNC: 99 MMOL/L (ref 97–108)
CHOLEST SERPL-MCNC: 185 MG/DL
CO2 SERPL-SCNC: 30 MMOL/L (ref 21–32)
CREAT SERPL-MCNC: 0.88 MG/DL (ref 0.55–1.02)
ERYTHROCYTE [DISTWIDTH] IN BLOOD BY AUTOMATED COUNT: 12.6 % (ref 11.5–14.5)
GLOBULIN SER CALC-MCNC: 3.4 G/DL (ref 2–4)
GLUCOSE SERPL-MCNC: 131 MG/DL (ref 65–100)
HCT VFR BLD AUTO: 37.1 % (ref 35–47)
HDLC SERPL-MCNC: 63 MG/DL
HDLC SERPL: 2.9 (ref 0–5)
HGB BLD-MCNC: 12.4 G/DL (ref 11.5–16)
LDLC SERPL CALC-MCNC: 99.6 MG/DL (ref 0–100)
MCH RBC QN AUTO: 30.9 PG (ref 26–34)
MCHC RBC AUTO-ENTMCNC: 33.4 G/DL (ref 30–36.5)
MCV RBC AUTO: 92.5 FL (ref 80–99)
NRBC # BLD: 0 K/UL (ref 0–0.01)
NRBC BLD-RTO: 0 PER 100 WBC
PLATELET # BLD AUTO: 278 K/UL (ref 150–400)
PMV BLD AUTO: 10.4 FL (ref 8.9–12.9)
POTASSIUM SERPL-SCNC: 3.3 MMOL/L (ref 3.5–5.1)
PROT SERPL-MCNC: 7.8 G/DL (ref 6.4–8.2)
RBC # BLD AUTO: 4.01 M/UL (ref 3.8–5.2)
SODIUM SERPL-SCNC: 138 MMOL/L (ref 136–145)
TRIGL SERPL-MCNC: 112 MG/DL
VLDLC SERPL CALC-MCNC: 22.4 MG/DL
WBC # BLD AUTO: 3.5 K/UL (ref 3.6–11)

## 2025-03-05 RX ORDER — POTASSIUM CHLORIDE 750 MG/1
20 TABLET, EXTENDED RELEASE ORAL DAILY
Qty: 180 TABLET | Refills: 3 | Status: SHIPPED | OUTPATIENT
Start: 2025-03-05

## 2025-04-07 RX ORDER — ATORVASTATIN CALCIUM 10 MG/1
10 TABLET, FILM COATED ORAL DAILY
Qty: 90 TABLET | Refills: 3 | Status: SHIPPED | OUTPATIENT
Start: 2025-04-07

## 2025-04-07 NOTE — TELEPHONE ENCOUNTER
Last appointment: 2/11/25  Next appointment: 8/18/25  Previous refill encounter(s): 1/30/24    Requested Prescriptions     Pending Prescriptions Disp Refills    atorvastatin (LIPITOR) 10 MG tablet [Pharmacy Med Name: ATORVASTATIN 10 MG TABLET] 90 tablet 3     Sig: TAKE 1 TABLET BY MOUTH EVERY DAY         For Pharmacy Admin Tracking Only    Program: Medication Refill  CPA in place:    Recommendation Provided To:   Intervention Detail: New Rx: 1, reason: Patient Preference  Intervention Accepted By:   Gap Closed?:    Time Spent (min): 5

## 2025-04-08 ENCOUNTER — TELEPHONE (OUTPATIENT)
Age: 65
End: 2025-04-08

## 2025-04-08 NOTE — TELEPHONE ENCOUNTER
[phone] - Patient is requesting a [employer] document be completed on her behalf. Form placed in provides box

## 2025-04-09 ENCOUNTER — CLINICAL DOCUMENTATION (OUTPATIENT)
Age: 65
End: 2025-04-09

## 2025-04-09 NOTE — PROGRESS NOTES
Pt [employer] [facility] Serious Medical Condition Certification Form was faxed today to [phone].  Pt made aware and she want the original copy to be mailed back to her.  Form in outgoing mail.

## 2025-04-24 ENCOUNTER — TELEPHONE (OUTPATIENT)
Age: 65
End: 2025-04-24

## 2025-04-24 NOTE — TELEPHONE ENCOUNTER
Anabelle mcintosh [facility] [id number] [phone]   calling to follow up with dr oneal  Regarding gettting her to take two new patients for her     Family members     She has not heard back yet and wants to make sure dr oneal has reeived her informationai

## 2025-05-16 ENCOUNTER — CLINICAL DOCUMENTATION (OUTPATIENT)
Age: 65
End: 2025-05-16

## 2025-05-16 NOTE — PROGRESS NOTES
Pt most recent lab results and office notes were faxed to [facility] at [phone] attention to Shea Padilla NP.

## 2025-06-30 RX ORDER — ESCITALOPRAM OXALATE 10 MG/1
TABLET ORAL
Qty: 135 TABLET | Refills: 3 | Status: SHIPPED | OUTPATIENT
Start: 2025-06-30

## 2025-06-30 NOTE — TELEPHONE ENCOUNTER
Last appointment: 2/11/25  Next appointment: 8/18/25  Previous refill encounter(s): 6/12/24    Requested Prescriptions     Pending Prescriptions Disp Refills    escitalopram (LEXAPRO) 10 MG tablet [Pharmacy Med Name: ESCITALOPRAM 10 MG TABLET] 135 tablet 3     Sig: TAKE 1 AND 1/2 TABLETS DAILY BY MOUTH         For Pharmacy Admin Tracking Only    Program: Medication Refill  CPA in place:    Recommendation Provided To:   Intervention Detail: New Rx: 1, reason: Patient Preference  Intervention Accepted By:   Gap Closed?:    Time Spent (min): 5

## 2025-07-25 ENCOUNTER — OFFICE VISIT (OUTPATIENT)
Age: 65
End: 2025-07-25

## 2025-07-25 VITALS
HEART RATE: 99 BPM | BODY MASS INDEX: 28.35 KG/M2 | RESPIRATION RATE: 16 BRPM | TEMPERATURE: 98.3 F | DIASTOLIC BLOOD PRESSURE: 74 MMHG | OXYGEN SATURATION: 94 % | SYSTOLIC BLOOD PRESSURE: 138 MMHG | WEIGHT: 155 LBS

## 2025-07-25 DIAGNOSIS — R09.81 CONGESTION OF NASAL SINUS: ICD-10-CM

## 2025-07-25 DIAGNOSIS — Z20.822 CLOSE EXPOSURE TO COVID-19 VIRUS: ICD-10-CM

## 2025-07-25 DIAGNOSIS — J06.9 VIRAL URI WITH COUGH: Primary | ICD-10-CM

## 2025-07-25 PROBLEM — D64.89 OTHER SPECIFIED ANEMIAS: Status: ACTIVE | Noted: 2025-07-25

## 2025-07-25 LAB
Lab: NORMAL
PERFORMING INSTRUMENT: NORMAL
QC PASS/FAIL: NORMAL
SARS-COV-2, POC: NORMAL

## 2025-07-25 RX ORDER — BENZONATATE 200 MG/1
200 CAPSULE ORAL 3 TIMES DAILY PRN
Qty: 30 CAPSULE | Refills: 0 | Status: SHIPPED | OUTPATIENT
Start: 2025-07-25 | End: 2025-08-04

## 2025-07-25 RX ORDER — FLUTICASONE PROPIONATE 50 MCG
2 SPRAY, SUSPENSION (ML) NASAL DAILY
Qty: 16 G | Refills: 0 | Status: SHIPPED | OUTPATIENT
Start: 2025-07-25

## 2025-07-25 NOTE — PATIENT INSTRUCTIONS
if chest pain, shortness of breath, dehydration, high or persistent fevers or any new concerning symptoms.  Follow up with PCP in 3-5 days for persistent or recurrent symptoms.

## 2025-07-25 NOTE — PROGRESS NOTES
Patient Name: Anabelle Myrick   YOB: 1960   Patient Status: New patient,   Chief Complaint: Cold Symptoms (Daughter has covid, feels a little bit sick - cod symptoms, negative home test a few minutes ago. MIL is in hospital and needs to make sure she is safe to visit)      ____________________________________________________________________________________________    ASSESSMENT/PLAN:    Clinical Impression:   Assessment & Plan  Congestion of nasal sinus            Close exposure to COVID-19 virus       Orders:    POCT COVID-19, Antigen    Viral URI with cough       Orders:    benzonatate (TESSALON) 200 MG capsule; Take 1 capsule by mouth 3 times daily as needed for Cough    fluticasone (FLONASE) 50 MCG/ACT nasal spray; 2 sprays by Each Nostril route daily         MDM: The patient presents with symptoms and clinic findings consistent with viral URI with cough . At the time of discharge the patient clinically looked well. Covid is negative, recommend repeat testing tomorrow given short period of symptoms onset.  The patient had normal work of breathing. Normal level of alertness.  Well hydrated. No meningeal signs. Lungs CTA. Normal room air O2sat. No signs of sepsis.  Recommend avoiding the hospital while having symptoms for first 5 days and then wearing mask until resolved if in the hospital setting. ER precautions given to include persistent high fever, shortness of breath or labored breathing, chest pain or new / worsening symptoms. Symptoms care instructions given and discussed.  All questions answered.  Encouraged follow up with PCP for persistent or recurrent symptoms. Patient verbalized understanding and agreement with precautions and care plan.        External Records Reviewed: None    Limitation to History: None    Outside Historian: None    SUBJECTIVE/OBJECTIVE:  Anabelle Myrick is a 64 y.o. female presents with complaint of mild congestion, headache, chills, and cough.  Symptoms began

## 2025-07-28 RX ORDER — IBUPROFEN 800 MG/1
800 TABLET, FILM COATED ORAL EVERY 8 HOURS PRN
Qty: 90 TABLET | Refills: 1 | Status: SHIPPED | OUTPATIENT
Start: 2025-07-28

## 2025-07-28 NOTE — TELEPHONE ENCOUNTER
Last appointment: 2/11/25  Next appointment: 8/18/25  Previous refill encounter(s): 3/8/24    Requested Prescriptions     Pending Prescriptions Disp Refills    ibuprofen (ADVIL;MOTRIN) 800 MG tablet [Pharmacy Med Name: IBUPROFEN 800 MG TABLET] 90 tablet 1     Sig: TAKE 1 TABLET BY MOUTH EVERY 8 HOURS AS NEEDED FOR PAIN         For Pharmacy Admin Tracking Only    Program: Medication Refill  CPA in place:    Recommendation Provided To:   Intervention Detail: New Rx: 1, reason: Patient Preference  Intervention Accepted By:   Gap Closed?:    Time Spent (min): 5

## 2025-08-27 RX ORDER — AMLODIPINE BESYLATE 5 MG/1
5 TABLET ORAL EVERY MORNING
Qty: 90 TABLET | Refills: 3 | Status: SHIPPED | OUTPATIENT
Start: 2025-08-27